# Patient Record
Sex: MALE | Race: WHITE | NOT HISPANIC OR LATINO
[De-identification: names, ages, dates, MRNs, and addresses within clinical notes are randomized per-mention and may not be internally consistent; named-entity substitution may affect disease eponyms.]

---

## 2018-01-11 PROBLEM — Z00.00 ENCOUNTER FOR PREVENTIVE HEALTH EXAMINATION: Status: ACTIVE | Noted: 2018-01-11

## 2018-01-16 ENCOUNTER — APPOINTMENT (OUTPATIENT)
Dept: SURGERY | Facility: CLINIC | Age: 83
End: 2018-01-16

## 2018-02-13 ENCOUNTER — APPOINTMENT (OUTPATIENT)
Dept: SURGERY | Facility: CLINIC | Age: 83
End: 2018-02-13

## 2018-04-04 VITALS
DIASTOLIC BLOOD PRESSURE: 67 MMHG | HEIGHT: 60 IN | HEART RATE: 63 BPM | OXYGEN SATURATION: 98 % | WEIGHT: 146.17 LBS | RESPIRATION RATE: 18 BRPM | SYSTOLIC BLOOD PRESSURE: 152 MMHG | TEMPERATURE: 98 F

## 2018-04-04 NOTE — PATIENT PROFILE ADULT. - PMH
Afib    CHF (congestive heart failure)    MI (myocardial infarction) Afib    CHF (congestive heart failure)    Enlarged prostate    GERD (gastroesophageal reflux disease)    High cholesterol    MI (myocardial infarction)

## 2018-04-05 ENCOUNTER — INPATIENT (INPATIENT)
Facility: HOSPITAL | Age: 83
LOS: 0 days | Discharge: HOME | End: 2018-04-05
Attending: SURGERY | Admitting: SURGERY
Payer: MEDICARE

## 2018-04-05 ENCOUNTER — INPATIENT (INPATIENT)
Facility: HOSPITAL | Age: 83
LOS: 8 days | Discharge: HOME HEALTH | DRG: 273 | End: 2018-04-14
Attending: INTERNAL MEDICINE | Admitting: INTERNAL MEDICINE
Payer: MEDICARE

## 2018-04-05 VITALS
HEART RATE: 68 BPM | RESPIRATION RATE: 17 BRPM | TEMPERATURE: 98 F | DIASTOLIC BLOOD PRESSURE: 82 MMHG | SYSTOLIC BLOOD PRESSURE: 121 MMHG | OXYGEN SATURATION: 98 %

## 2018-04-05 DIAGNOSIS — I25.10 ATHEROSCLEROTIC HEART DISEASE OF NATIVE CORONARY ARTERY WITHOUT ANGINA PECTORIS: ICD-10-CM

## 2018-04-05 DIAGNOSIS — I48.91 UNSPECIFIED ATRIAL FIBRILLATION: ICD-10-CM

## 2018-04-05 DIAGNOSIS — R63.8 OTHER SYMPTOMS AND SIGNS CONCERNING FOOD AND FLUID INTAKE: ICD-10-CM

## 2018-04-05 DIAGNOSIS — K21.9 GASTRO-ESOPHAGEAL REFLUX DISEASE WITHOUT ESOPHAGITIS: ICD-10-CM

## 2018-04-05 DIAGNOSIS — I48.2 CHRONIC ATRIAL FIBRILLATION: ICD-10-CM

## 2018-04-05 DIAGNOSIS — Z95.1 PRESENCE OF AORTOCORONARY BYPASS GRAFT: Chronic | ICD-10-CM

## 2018-04-05 DIAGNOSIS — I35.0 NONRHEUMATIC AORTIC (VALVE) STENOSIS: ICD-10-CM

## 2018-04-05 DIAGNOSIS — K46.9 UNSPECIFIED ABDOMINAL HERNIA WITHOUT OBSTRUCTION OR GANGRENE: ICD-10-CM

## 2018-04-05 DIAGNOSIS — Z29.9 ENCOUNTER FOR PROPHYLACTIC MEASURES, UNSPECIFIED: ICD-10-CM

## 2018-04-05 DIAGNOSIS — E78.00 PURE HYPERCHOLESTEROLEMIA, UNSPECIFIED: ICD-10-CM

## 2018-04-05 DIAGNOSIS — N40.0 BENIGN PROSTATIC HYPERPLASIA WITHOUT LOWER URINARY TRACT SYMPTOMS: ICD-10-CM

## 2018-04-05 DIAGNOSIS — I50.43 ACUTE ON CHRONIC COMBINED SYSTOLIC (CONGESTIVE) AND DIASTOLIC (CONGESTIVE) HEART FAILURE: ICD-10-CM

## 2018-04-05 DIAGNOSIS — I50.9 HEART FAILURE, UNSPECIFIED: ICD-10-CM

## 2018-04-05 DIAGNOSIS — E78.5 HYPERLIPIDEMIA, UNSPECIFIED: ICD-10-CM

## 2018-04-05 LAB
ALBUMIN SERPL ELPH-MCNC: 3.7 G/DL — SIGNIFICANT CHANGE UP (ref 3.3–5)
ALP SERPL-CCNC: 73 U/L — SIGNIFICANT CHANGE UP (ref 40–120)
ALT FLD-CCNC: 20 U/L — SIGNIFICANT CHANGE UP (ref 10–45)
ANION GAP SERPL CALC-SCNC: 15 MMOL/L — SIGNIFICANT CHANGE UP (ref 5–17)
APTT BLD: 35.7 SEC — SIGNIFICANT CHANGE UP (ref 27.5–37.4)
APTT BLD: 38.2 SEC — HIGH (ref 27.5–37.4)
AST SERPL-CCNC: 46 U/L — HIGH (ref 10–40)
BASOPHILS NFR BLD AUTO: 0.2 % — SIGNIFICANT CHANGE UP (ref 0–2)
BILIRUB SERPL-MCNC: 1 MG/DL — SIGNIFICANT CHANGE UP (ref 0.2–1.2)
BUN SERPL-MCNC: 12 MG/DL — SIGNIFICANT CHANGE UP (ref 7–23)
CALCIUM SERPL-MCNC: 9.6 MG/DL — SIGNIFICANT CHANGE UP (ref 8.4–10.5)
CHLORIDE SERPL-SCNC: 100 MMOL/L — SIGNIFICANT CHANGE UP (ref 96–108)
CHOLEST SERPL-MCNC: 162 MG/DL — SIGNIFICANT CHANGE UP (ref 10–199)
CK MB CFR SERPL CALC: 3.1 NG/ML — SIGNIFICANT CHANGE UP (ref 0–6.7)
CK MB CFR SERPL CALC: 3.4 NG/ML — SIGNIFICANT CHANGE UP (ref 0–6.7)
CK SERPL-CCNC: 114 U/L — SIGNIFICANT CHANGE UP (ref 30–200)
CK SERPL-CCNC: 89 U/L — SIGNIFICANT CHANGE UP (ref 30–200)
CO2 SERPL-SCNC: 25 MMOL/L — SIGNIFICANT CHANGE UP (ref 22–31)
CREAT SERPL-MCNC: 0.91 MG/DL — SIGNIFICANT CHANGE UP (ref 0.5–1.3)
EOSINOPHIL NFR BLD AUTO: 0.2 % — SIGNIFICANT CHANGE UP (ref 0–6)
GLUCOSE SERPL-MCNC: 112 MG/DL — HIGH (ref 70–99)
HBA1C BLD-MCNC: 5.8 % — HIGH (ref 4–5.6)
HCT VFR BLD CALC: 38.3 % — LOW (ref 39–50)
HDLC SERPL-MCNC: 61 MG/DL — SIGNIFICANT CHANGE UP (ref 40–125)
HGB BLD-MCNC: 12.2 G/DL — LOW (ref 13–17)
INR BLD: 1.25 — HIGH (ref 0.88–1.16)
INR BLD: 1.33 — HIGH (ref 0.88–1.16)
LIPID PNL WITH DIRECT LDL SERPL: 84 MG/DL — SIGNIFICANT CHANGE UP
LYMPHOCYTES # BLD AUTO: 24.8 % — SIGNIFICANT CHANGE UP (ref 13–44)
MCHC RBC-ENTMCNC: 31.9 G/DL — LOW (ref 32–36)
MCHC RBC-ENTMCNC: 32.3 PG — SIGNIFICANT CHANGE UP (ref 27–34)
MCV RBC AUTO: 101.3 FL — HIGH (ref 80–100)
MONOCYTES NFR BLD AUTO: 8.1 % — SIGNIFICANT CHANGE UP (ref 2–14)
NEUTROPHILS NFR BLD AUTO: 66.7 % — SIGNIFICANT CHANGE UP (ref 43–77)
NT-PROBNP SERPL-SCNC: HIGH PG/ML (ref 0–300)
PLATELET # BLD AUTO: 214 K/UL — SIGNIFICANT CHANGE UP (ref 150–400)
POTASSIUM SERPL-MCNC: 3.3 MMOL/L — LOW (ref 3.5–5.3)
POTASSIUM SERPL-MCNC: SIGNIFICANT CHANGE UP MMOL/L (ref 3.5–5.3)
POTASSIUM SERPL-SCNC: 3.3 MMOL/L — LOW (ref 3.5–5.3)
POTASSIUM SERPL-SCNC: SIGNIFICANT CHANGE UP MMOL/L (ref 3.5–5.3)
PROT SERPL-MCNC: 7.6 G/DL — SIGNIFICANT CHANGE UP (ref 6–8.3)
PROTHROM AB SERPL-ACNC: 13.9 SEC — HIGH (ref 9.8–12.7)
PROTHROM AB SERPL-ACNC: 14.9 SEC — HIGH (ref 9.8–12.7)
RBC # BLD: 3.78 M/UL — LOW (ref 4.2–5.8)
RBC # FLD: 15 % — SIGNIFICANT CHANGE UP (ref 10.3–16.9)
SODIUM SERPL-SCNC: 140 MMOL/L — SIGNIFICANT CHANGE UP (ref 135–145)
TOTAL CHOLESTEROL/HDL RATIO MEASUREMENT: 2.7 RATIO — LOW (ref 3.4–9.6)
TRIGL SERPL-MCNC: 83 MG/DL — SIGNIFICANT CHANGE UP (ref 10–149)
TROPONIN T SERPL-MCNC: 0.03 NG/ML — HIGH (ref 0–0.01)
TROPONIN T SERPL-MCNC: 0.03 NG/ML — HIGH (ref 0–0.01)
TSH SERPL-MCNC: 2.75 UIU/ML — SIGNIFICANT CHANGE UP (ref 0.35–4.94)
WBC # BLD: 5.3 K/UL — SIGNIFICANT CHANGE UP (ref 3.8–10.5)
WBC # FLD AUTO: 5.3 K/UL — SIGNIFICANT CHANGE UP (ref 3.8–10.5)

## 2018-04-05 PROCEDURE — 93010 ELECTROCARDIOGRAM REPORT: CPT

## 2018-04-05 PROCEDURE — C8929: CPT

## 2018-04-05 PROCEDURE — 99223 1ST HOSP IP/OBS HIGH 75: CPT

## 2018-04-05 PROCEDURE — 71045 X-RAY EXAM CHEST 1 VIEW: CPT | Mod: 26

## 2018-04-05 PROCEDURE — 93306 TTE W/DOPPLER COMPLETE: CPT | Mod: 26

## 2018-04-05 PROCEDURE — 99285 EMERGENCY DEPT VISIT HI MDM: CPT | Mod: 25

## 2018-04-05 PROCEDURE — 85610 PROTHROMBIN TIME: CPT

## 2018-04-05 PROCEDURE — 93010 ELECTROCARDIOGRAM REPORT: CPT | Mod: 77

## 2018-04-05 PROCEDURE — 85730 THROMBOPLASTIN TIME PARTIAL: CPT

## 2018-04-05 RX ORDER — HEPARIN SODIUM 5000 [USP'U]/ML
1000 INJECTION INTRAVENOUS; SUBCUTANEOUS
Qty: 25000 | Refills: 0 | Status: DISCONTINUED | OUTPATIENT
Start: 2018-04-05 | End: 2018-04-05

## 2018-04-05 RX ORDER — POTASSIUM CHLORIDE 20 MEQ
40 PACKET (EA) ORAL EVERY 4 HOURS
Qty: 0 | Refills: 0 | Status: COMPLETED | OUTPATIENT
Start: 2018-04-05 | End: 2018-04-05

## 2018-04-05 RX ORDER — TAMSULOSIN HYDROCHLORIDE 0.4 MG/1
0.4 CAPSULE ORAL AT BEDTIME
Qty: 0 | Refills: 0 | Status: DISCONTINUED | OUTPATIENT
Start: 2018-04-05 | End: 2018-04-14

## 2018-04-05 RX ORDER — ASPIRIN/CALCIUM CARB/MAGNESIUM 324 MG
81 TABLET ORAL DAILY
Qty: 0 | Refills: 0 | Status: DISCONTINUED | OUTPATIENT
Start: 2018-04-05 | End: 2018-04-12

## 2018-04-05 RX ORDER — PANTOPRAZOLE SODIUM 20 MG/1
40 TABLET, DELAYED RELEASE ORAL
Qty: 0 | Refills: 0 | Status: DISCONTINUED | OUTPATIENT
Start: 2018-04-05 | End: 2018-04-14

## 2018-04-05 RX ORDER — ACETAMINOPHEN 500 MG
650 TABLET ORAL EVERY 6 HOURS
Qty: 0 | Refills: 0 | Status: DISCONTINUED | OUTPATIENT
Start: 2018-04-05 | End: 2018-04-07

## 2018-04-05 RX ORDER — FUROSEMIDE 40 MG
40 TABLET ORAL ONCE
Qty: 0 | Refills: 0 | Status: COMPLETED | OUTPATIENT
Start: 2018-04-05 | End: 2018-04-05

## 2018-04-05 RX ORDER — HEPARIN SODIUM 5000 [USP'U]/ML
5000 INJECTION INTRAVENOUS; SUBCUTANEOUS EVERY 8 HOURS
Qty: 0 | Refills: 0 | Status: DISCONTINUED | OUTPATIENT
Start: 2018-04-05 | End: 2018-04-05

## 2018-04-05 RX ORDER — APIXABAN 2.5 MG/1
2.5 TABLET, FILM COATED ORAL EVERY 12 HOURS
Qty: 0 | Refills: 0 | Status: DISCONTINUED | OUTPATIENT
Start: 2018-04-05 | End: 2018-04-07

## 2018-04-05 RX ORDER — ATORVASTATIN CALCIUM 80 MG/1
20 TABLET, FILM COATED ORAL AT BEDTIME
Qty: 0 | Refills: 0 | Status: DISCONTINUED | OUTPATIENT
Start: 2018-04-05 | End: 2018-04-14

## 2018-04-05 RX ORDER — IPRATROPIUM/ALBUTEROL SULFATE 18-103MCG
3 AEROSOL WITH ADAPTER (GRAM) INHALATION ONCE
Qty: 0 | Refills: 0 | Status: COMPLETED | OUTPATIENT
Start: 2018-04-05 | End: 2018-04-05

## 2018-04-05 RX ADMIN — TAMSULOSIN HYDROCHLORIDE 0.4 MILLIGRAM(S): 0.4 CAPSULE ORAL at 21:49

## 2018-04-05 RX ADMIN — Medication 40 MILLIGRAM(S): at 19:06

## 2018-04-05 RX ADMIN — APIXABAN 2.5 MILLIGRAM(S): 2.5 TABLET, FILM COATED ORAL at 21:50

## 2018-04-05 RX ADMIN — Medication 40 MILLIEQUIVALENT(S): at 21:50

## 2018-04-05 RX ADMIN — Medication 3 MILLILITER(S): at 22:52

## 2018-04-05 RX ADMIN — Medication 40 MILLIEQUIVALENT(S): at 18:10

## 2018-04-05 RX ADMIN — ATORVASTATIN CALCIUM 20 MILLIGRAM(S): 80 TABLET, FILM COATED ORAL at 21:49

## 2018-04-05 NOTE — H&P ADULT - PROBLEM SELECTOR PLAN 4
Patient with Stable Angina Based on History. Troponin Elevated to 0.03. However, Likely Demand in Setting of CHF and Severe Aortic Stenosis.   -Will Trend Troponin To Peak.   -Continue Aspirin 81mg PO Q daily, Lipitor 20mg PO QHS.

## 2018-04-05 NOTE — CONSULT NOTE ADULT - SUBJECTIVE AND OBJECTIVE BOX
Surgeon: Dr. Cyr  Requesting Physician: Dr. Landry    HISTORY OF PRESENT ILLNESS:  89y Male    PAST MEDICAL & SURGICAL HISTORY:  Enlarged prostate  High cholesterol  GERD (gastroesophageal reflux disease)  MI (myocardial infarction)  CHF (congestive heart failure)  Afib  S/P CABG (coronary artery bypass graft): 19 years ago @ [facility]      MEDICATIONS  (STANDING):    MEDICATIONS  (PRN):      Allergies    No Known Allergies    Intolerances        SOCIAL HISTORY:  Smoker:  YES / NO        PACK YEARS:                         WHEN QUIT?  ETOH use:  YES / NO               FREQUENCY / QUANTITY:  Ilicit Drug use:  YES / NO  Occupation:  Assisted device use (Cane / Walker):  Live with:    FAMILY HISTORY:      Review of Systems  CONSTITUTIONAL:  Fevers / chills, sweats, fatigue, weight loss, weight gain                                    NEGATIVE  NEURO:  parathesias, seizures, syncope, confusion                                                                               NEGATIVE  EYES:  Blurry vision, discharge, pain, loss of vision                                                                                  NEGATIVE  ENMT:  Difficulty hearing, vertigo, dysphagia, epistaxis, recent dental work                                     NEGATIVE  CV:  Chest pain, palpitations, MCCARTHY, orthopnea                                                                                         NEGATIVE  RESPIRATORY:  Wheezing, SOB, cough / sputum, hemoptysis                                                              NEGATIVE  GI:  Nausea, vommiting, diarrhea, constipation, melena                                                                        NEGATIVE  : Hematuria, dysuria, urgency, incontinence                                                                                       NEGATIVE  MUSKULOSKELETAL:  arthritis, joint swelling, muscle weakness                                                           NEGATIVE  SKIN/BREAST:  rash, itching, shantell loss, masses                                                                                            NEGATIVE  PSYCH:  depresion, anxiety, suicidal ideation                                                                                             NEGATIVE  HEME/LYMPH:  bruises easily, enlarged lymph nodes, tender lymph nodes                                        NEGATIVE  ENDOCRINE:  cold intolerance, heat intolerance, polydipsia                                                                   NEGATIVE    PHYSICAL EXAM  Vital Signs Last 24 Hrs  T(C): 36.7 (05 Apr 2018 12:03), Max: 36.7 (05 Apr 2018 12:03)  T(F): 98 (05 Apr 2018 12:03), Max: 98 (05 Apr 2018 12:03)  HR: 68 (05 Apr 2018 12:03) (63 - 68)  BP: 121/82 (05 Apr 2018 12:03) (121/82 - 152/67)  BP(mean): --  RR: 17 (05 Apr 2018 12:03) (17 - 18)  SpO2: 98% (05 Apr 2018 12:03) (98% - 98%)    CONSTITUTIONAL:                                                                          WNL  NEURO:                                                                                             WNL                      EYES:                                                                                                  WNL  ENMT:                                                                                                WNL  CV:                                                                                                      WNL  RESPIRATORY:                                                                                  WNL  GI:                                                                                                       WNL  : GUTIERREZ + / -                                                                                 WNL  MUSKULOSKELETAL:                                                                       WNL  SKIN / BREAST:                                                                                 WNL                                                          LABS:                        12.2   5.3   )-----------( 214      ( 05 Apr 2018 13:06 )             38.3     04-05    140  |  100  |  12  ----------------------------<  112<H>  see note   |  25  |  0.91    Ca    9.6      05 Apr 2018 13:06    TPro  7.6  /  Alb  3.7  /  TBili  1.0  /  DBili  x   /  AST  46<H>  /  ALT  20  /  AlkPhos  73  04-05    PT/INR - ( 05 Apr 2018 13:06 )   PT: 14.9 sec;   INR: 1.33          PTT - ( 05 Apr 2018 13:06 )  PTT:35.7 sec    CARDIAC MARKERS ( 05 Apr 2018 13:06 )  x     / 0.03 ng/mL / 114 U/L / x     / 3.4 ng/mL          RADIOLOGY & ADDITIONAL STUDIES:    CXR: 4/5/18 official report pending. B/L infiltrates/effusion on CXR.    EKG: pending    TTE / GAVIOTA:  TTE Echo w/Cont Complete (04.05.18 @ 09:32)  Interpretation Summary  Irregular heart rate with various aortic gradient: the mean aortic valve   areabetween 46 and 32mmHg  Normal left ventricular size and wall   thickness.There is apical dyskinesis.The other walls are   hypokinetic.There is mid inferolateral wall dyskinesis.The left ventricular ejection fraction   is estimated to be 30-35%The left atrium is dilated.The mitral inflow   pattern is consistent with restrictive left ventricular filling, suggestive of   severely elevated left atrial pressure (>20mmHg).  Structurally normal mitral   valve.There is mild mitral regurgitation.Structurally normal tricuspid   valve.The pulmonary artery systolic pressure is estimated to be 80   mmHg.Structurally normal pulmonic valve.No aortic root dilatation.There   is no pericardial effusion.Left pleural effusion noted.A complete   two-dimensional transthoracic echocardiogram was performed (2D, M-mode, spectral and   color flow doppler).  Contrast injection with Definity performed Surgeon: Dr. Cyr  Requesting Physician: Dr. Landry    HISTORY OF PRESENT ILLNESS:  89y Male with a PMHx of CAD s/p CABG, afib on eliquis, AS, MR, CHD, encarcerated inguinal hernia (planning repair with Dr. Peres) that was sent to [facility] ED for a newly diagnosed pleural effusion.     PAST MEDICAL & SURGICAL HISTORY:  Enlarged prostate  High cholesterol  GERD (gastroesophageal reflux disease)  MI (myocardial infarction)  CHF (congestive heart failure)  Afib  S/P CABG (coronary artery bypass graft): 19 years ago @ [facility]      MEDICATIONS  (STANDING):    MEDICATIONS  (PRN):      Allergies    No Known Allergies    Intolerances        SOCIAL HISTORY:  Smoker:  YES / NO        PACK YEARS:                         WHEN QUIT?  ETOH use:  YES / NO               FREQUENCY / QUANTITY:  Ilicit Drug use:  YES / NO  Occupation:  Assisted device use (Cane / Walker):  Live with:    FAMILY HISTORY:      Review of Systems  CONSTITUTIONAL:  Fevers / chills, sweats, fatigue, weight loss, weight gain                                    NEGATIVE  NEURO:  parathesias, seizures, syncope, confusion                                                                               NEGATIVE  EYES:  Blurry vision, discharge, pain, loss of vision                                                                                  NEGATIVE  ENMT:  Difficulty hearing, vertigo, dysphagia, epistaxis, recent dental work                                     NEGATIVE  CV:  Chest pain, palpitations, MCCARTHY, orthopnea                                                                                         NEGATIVE  RESPIRATORY:  Wheezing, SOB, cough / sputum, hemoptysis                                                              NEGATIVE  GI:  Nausea, vommiting, diarrhea, constipation, melena                                                                        NEGATIVE  : Hematuria, dysuria, urgency, incontinence                                                                                       NEGATIVE  MUSKULOSKELETAL:  arthritis, joint swelling, muscle weakness                                                           NEGATIVE  SKIN/BREAST:  rash, itching, shantell loss, masses                                                                                            NEGATIVE  PSYCH:  depresion, anxiety, suicidal ideation                                                                                             NEGATIVE  HEME/LYMPH:  bruises easily, enlarged lymph nodes, tender lymph nodes                                        NEGATIVE  ENDOCRINE:  cold intolerance, heat intolerance, polydipsia                                                                   NEGATIVE    PHYSICAL EXAM  Vital Signs Last 24 Hrs  T(C): 36.7 (05 Apr 2018 12:03), Max: 36.7 (05 Apr 2018 12:03)  T(F): 98 (05 Apr 2018 12:03), Max: 98 (05 Apr 2018 12:03)  HR: 68 (05 Apr 2018 12:03) (63 - 68)  BP: 121/82 (05 Apr 2018 12:03) (121/82 - 152/67)  BP(mean): --  RR: 17 (05 Apr 2018 12:03) (17 - 18)  SpO2: 98% (05 Apr 2018 12:03) (98% - 98%)    CONSTITUTIONAL:                                                                          WNL  NEURO:                                                                                             WNL                      EYES:                                                                                                  WNL  ENMT:                                                                                                WNL  CV:                                                                                                      WNL  RESPIRATORY:                                                                                  WNL  GI:                                                                                                       WNL  : GUTIERREZ + / -                                                                                 WNL  MUSKULOSKELETAL:                                                                       WNL  SKIN / BREAST:                                                                                 WNL                                                          LABS:                        12.2   5.3   )-----------( 214      ( 05 Apr 2018 13:06 )             38.3     04-05    140  |  100  |  12  ----------------------------<  112<H>  see note   |  25  |  0.91    Ca    9.6      05 Apr 2018 13:06    TPro  7.6  /  Alb  3.7  /  TBili  1.0  /  DBili  x   /  AST  46<H>  /  ALT  20  /  AlkPhos  73  04-05    PT/INR - ( 05 Apr 2018 13:06 )   PT: 14.9 sec;   INR: 1.33          PTT - ( 05 Apr 2018 13:06 )  PTT:35.7 sec    CARDIAC MARKERS ( 05 Apr 2018 13:06 )  x     / 0.03 ng/mL / 114 U/L / x     / 3.4 ng/mL          RADIOLOGY & ADDITIONAL STUDIES:    CXR: 4/5/18 official report pending. B/L infiltrates/effusion on CXR.    EKG: pending    TTE / GAVIOTA:  TTE Echo w/Cont Complete (04.05.18 @ 09:32)  Interpretation Summary  Irregular heart rate with various aortic gradient: the mean aortic valve   areabetween 46 and 32mmHg  Normal left ventricular size and wall   thickness.There is apical dyskinesis.The other walls are   hypokinetic.There is mid inferolateral wall dyskinesis.The left ventricular ejection fraction   is estimated to be 30-35%The left atrium is dilated.The mitral inflow   pattern is consistent with restrictive left ventricular filling, suggestive of   severely elevated left atrial pressure (>20mmHg).  Structurally normal mitral   valve.There is mild mitral regurgitation.Structurally normal tricuspid   valve.The pulmonary artery systolic pressure is estimated to be 80   mmHg.Structurally normal pulmonic valve.No aortic root dilatation.There   is no pericardial effusion.Left pleural effusion noted.A complete   two-dimensional transthoracic echocardiogram was performed (2D, M-mode, spectral and   color flow doppler).  Contrast injection with Definity performed Surgeon: Dr. Cyr  Requesting Physician: Dr. Landry    HISTORY OF PRESENT ILLNESS: **information taken from chart and patient's family**  88yo [place] speaking male poor historian, with a PMHx of CAD s/p CABG in 2001, afib on eliquis, AS, MR, CHF, incarcerated inguinal hernia that was sent to [facility] ED for a newly diagnosed pleural effusion. Patient initially presented to [facility] for a planned inguinal hernia repair where he was noted to be short of breath, an echo was performed and he was found to have a pleural effusion. Patient complains of worsening shortness of breath over the last 2 months, he states that he used to sleep with 1 pillow but now he sleeps with 2-3 but still wakes up at night short of breath. He denies limitation with activity and just yesterday was still able to walk up his 3 flights of stairs without needing to rest. Of note, the patient has been admitted to [facility] and [facility] 3 times since the summer of 2017 for "fluid in his legs and lungs", the most recent being 2 months ago at [facility]. He denies fever, chills, headache, dizziness, palpitations, chest pain, abdominal pain, n/v/d, pain in the upper or lower extremities.      PAST MEDICAL & SURGICAL HISTORY:  Enlarged prostate  High cholesterol  GERD (gastroesophageal reflux disease)  MI (myocardial infarction)  CHF (congestive heart failure)  Afib  S/P CABG (coronary artery bypass graft): 19 years ago @ [facility]      MEDICATIONS  (STANDING):    MEDICATIONS  (PRN):      Allergies    No Known Allergies    Intolerances        SOCIAL HISTORY:  Smoker:  Never  ETOH use:  YES       FREQUENCY / QUANTITY: on holidays  Ilicit Drug use:  NO  Occupation: retired  Lives: 3 floor walk up      FAMILY HISTORY:      Review of Systems  CONSTITUTIONAL:  Fevers / chills, sweats, fatigue, weight loss, weight gain                                            NEGATIVE  NEURO:  parathesias, seizures, syncope, confusion                                                                               NEGATIVE  EYES:  Blurry vision, discharge, pain, loss of vision                                                                                NEGATIVE  ENMT:  Difficulty hearing, vertigo, dysphagia, epistaxis, recent dental work                                            NEGATIVE  CV:  -Chest pain, -palpitations, -MCCARTHY, +orthopnea                                                                               POSITIVE  RESPIRATORY:  -Wheezing, +SOB, -cough / -sputum, -hemoptysis                                                        POSITIVE  GI:  Nausea, vomiting diarrhea, constipation, melena                                                                          NEGATIVE  : Hematuria, dysuria, urgency, incontinence                                                                                    NEGATIVE  MUSKULOSKELETAL:  arthritis, joint swelling, muscle weakness                                                             NEGATIVE  SKIN/BREAST:  rash, itching, hair loss, masses                                                                                     NEGATIVE  PSYCH:  depression anxiety, suicidal ideation                                                                                      NEGATIVE  HEME/LYMPH:  bruises easily, enlarged lymph nodes, tender lymph nodes                                            NEGATIVE  ENDOCRINE:  cold intolerance, heat intolerance, polydipsia                                                                  NEGATIVE    PHYSICAL EXAM  Vital Signs Last 24 Hrs  T(C): 36.7 (05 Apr 2018 12:03), Max: 36.7 (05 Apr 2018 12:03)  T(F): 98 (05 Apr 2018 12:03), Max: 98 (05 Apr 2018 12:03)  HR: 68 (05 Apr 2018 12:03) (63 - 68)  BP: 121/82 (05 Apr 2018 12:03) (121/82 - 152/67)  BP(mean): --  RR: 17 (05 Apr 2018 12:03) (17 - 18)  SpO2: 98% (05 Apr 2018 12:03) (98% - 98%)    CONSTITUTIONAL: NAD, lying comfortably in bed   NEURO: A&Ox3, no focal deficits                      EYES: WNL  ENMT:    WNL  CV: S1S2, irregularly irregular, +3/4 SUSAN   RESPIRATORY:  decreased breath sounds at b/l bases. No wheezing/rales/rhonchi. SATing 95-97% on room air  GI: +BS. NT/ND  : GUTIERREZ -  MUSKULOSKELETAL:     WNL  SKIN / BREAST:  WNL  EXTREMITIES:  2+ pitting edema to knees. No calf tenderness. Pulses nonpalpable due to edema. Warm and well perfused. No varicosities.                                                          LABS:                        12.2   5.3   )-----------( 214      ( 05 Apr 2018 13:06 )             38.3     04-05    140  |  100  |  12  ----------------------------<  112<H>  see note   |  25  |  0.91    Ca    9.6      05 Apr 2018 13:06    TPro  7.6  /  Alb  3.7  /  TBili  1.0  /  DBili  x   /  AST  46<H>  /  ALT  20  /  AlkPhos  73  04-05    PT/INR - ( 05 Apr 2018 13:06 )   PT: 14.9 sec;   INR: 1.33          PTT - ( 05 Apr 2018 13:06 )  PTT:35.7 sec    CARDIAC MARKERS ( 05 Apr 2018 13:06 )  x     / 0.03 ng/mL / 114 U/L / x     / 3.4 ng/mL      RADIOLOGY & ADDITIONAL STUDIES:    CXR: 4/5/18 official report pending. B/L infiltrates/effusion on CXR.      EKG: pending    TTE / GAVIOTA:  TTE Echo w/Cont Complete (04.05.18 @ 09:32)  Interpretation Summary  Irregular heart rate with various aortic gradient: the mean aortic valve   areabetween 46 and 32mmHg  Normal left ventricular size and wall   thickness.There is apical dyskinesis.The other walls are   hypokinetic.There is mid inferolateral wall dyskinesis.The left ventricular ejection fraction   is estimated to be 30-35%The left atrium is dilated.The mitral inflow   pattern is consistent with restrictive left ventricular filling, suggestive of   severely elevated left atrial pressure (>20mmHg).  Structurally normal mitral   valve.There is mild mitral regurgitation.Structurally normal tricuspid   valve.The pulmonary artery systolic pressure is estimated to be 80   mmHg.Structurally normal pulmonic valve.No aortic root dilatation.There   is no pericardial effusion.Left pleural effusion noted.A complete   two-dimensional transthoracic echocardiogram was performed (2D, M-mode, spectral and   color flow doppler).  Contrast injection with Definity performed

## 2018-04-05 NOTE — ED PROVIDER NOTE - CARDIAC, MLM
vertical sternal scar, irregular rhythm, + systolic ejection murmur  Heart sounds S1, S2.  No murmurs, rubs or gallops.

## 2018-04-05 NOTE — H&P ADULT - PROBLEM SELECTOR PLAN 2
Patient with Reports of PND, on Examination with JVD, Bibasilar Rales, Lower Extremity Pitting Edema, Labs Demonstrating BNP 85066, CXR with Pulmonary Effusions. Likely Multifactorial, Patient with Decreased EF and Severe Aortic Stenosis.   -Patient on Lasix 40mg PO QD Outpatient as per Records Obtained from Cardiologist (Dr. Noyola), and Discussion with PCP.   -Will Dose for Lasix 40mg IV x 1, and Evaluate Response with Strict I/Os, Daily Weights.   -Some Records Indicate Patient is on BB and ARB, However, Unclear and Will Need To Confirm. Patient reports Was Told Not To Take Beta Blocker.

## 2018-04-05 NOTE — H&P ADULT - ASSESSMENT
Patient is an 89 year old male with past medical history of Coronary Artery Disease (S/P CABG), Systolic Congestive Heart Failure, Atrial Fibrillation, Aortic Stenosis, Mitral Regurgitation, BPH Admitted for Congestive Heart Failure and Aortic Stenosis Management.

## 2018-04-05 NOTE — H&P ADULT - HISTORY OF PRESENT ILLNESS
Patient is an 89 year old male with past medical history of Coronary Artery Disease (S/P CABG), Systolic Congestive Heart Failure, Atrial Fibrillation, Aortic Stenosis, Mitral Regurgitation, BPH presenting to [facility] for Elective Hernia Repair. No Preoperative Labs or Imaging, Labs, CXR, EKG, and Echocardiogram Obtained at [facility]. Patient with Elevated Troponin 0.03, Pleural Effusions on CXR, EKG Atrial Fibrillation with RBBB, and Echocardiogram with EF 30-35% and Severe Aortic Stenosis. Patient Sent to ED.     Patient Polish Speaking, Family at Bedside Translating. Patient denies Fever, Chills. Patient reports Intermittent Chest Pain For Last 1 Year. As per Patient, Substernal, Worsened with Exertion, and Improved with Rest. As per Patient Stable, Unchanged in Last 1 Year. Patient denies Associated SOB, Palpitations, Dizziness/Lightheadedness. However, Reports Can Only Walk 2-3 Blocks, Reporting Slow Decline Over Last 1 Year. Patient denies Orthopnea, but Reports Paroxysmal Nocturnal Dyspnea. As per Family at Bedside, Patient with Multiple Recent Admission to Outside Hospitals for "Leg Swelling". Patient reports Lower Extremity Edema. Patient denies Abdominal Pain, Nausea, Vomiting, Diarrhea, Constipation, Melena, Hematochezia. Patient reports Bilateral Inguinal Pain, Right>Left. Patient denies Scrotal Edema.     In the ED, Vitals were TMax 98, HR 68-72, -133/75-82, RR 17-18, RR 98% on RA. Labs Notable for Anemia (Hemoglobin 12.1), Elevated Troponin 0.03. Chest XR with Bilateral Pleural Effusions. Echocardiogram with EF 30-35% and Severe AS. CT Surgery Consulted from ED.

## 2018-04-05 NOTE — H&P ADULT - NSHPPHYSICALEXAM_GEN_ALL_CORE
Vital Signs Last 24 Hrs  T(C): 36.9 (05 Apr 2018 17:54), Max: 37.4 (05 Apr 2018 15:36)  T(F): 98.4 (05 Apr 2018 17:54), Max: 99.4 (05 Apr 2018 15:36)  HR: 74 (05 Apr 2018 20:23) (68 - 74)  BP: 150/82 (05 Apr 2018 20:23) (121/82 - 150/82)  BP(mean): 102 (05 Apr 2018 20:23) (102 - 106)  RR: 20 (05 Apr 2018 20:23) (17 - 20)  SpO2: 93% (05 Apr 2018 20:23) (93% - 98%)    General: Well Appearing, NAD  HEENT: NCAT, PERRLA B/L, EOMI B/L, MMM  Neck: Supple, + JVD  Heart: Normal S1+S2, Irregular, 2/6 Systolic Murmur 2nd ICS Space on Right without Radiation to Carotids, 3/6 Holosystolic Murmur Mitral Area with Radiation to Axilla, No Rubs or Gallops  Lungs: Decreased BS Lung Bases Bilaterally, + Bibasilar Rales, No Wheezes, No Rhonchi.   Abdomen: Soft, Mildly Distended, Normoactive Bowel Sounds, + Bilateral Inguinal Tenderness   Extremities: Cool, 1+ DP Pulses Bilaterally, 2-3+ Pitting Edema to Knees Bilaterally  Neurological: AAOx3  Skin: Cool, Dry

## 2018-04-05 NOTE — ED ADULT TRIAGE NOTE - CHIEF COMPLAINT QUOTE
Patient was getting cardiology clearance today  for hernia repair and was told he has pleural effusion after getting GAVIOTA.

## 2018-04-05 NOTE — H&P ADULT - PROBLEM SELECTOR PLAN 1
Patient with History of Severe Aortic Stenosis. Noted on Echocardiogram Performed at [facility] Before Admission.   -Structural Heart Consulted, Follow-Up Recommendations.

## 2018-04-05 NOTE — CONSULT NOTE ADULT - PROBLEM SELECTOR RECOMMENDATION 9
-Medical management per primary team  -Will discuss case with Dr. Cyr -medical optimization with diuresis  -monitor pleural effusion, daily am chest xrays. ?pigtail catheter  -will need TAVR workup, CT structural scans, carotids, PFTs  -Medical management per primary team  -Will discuss case with Dr. Cyr -possible BAV on this admission  -continue medical optimization with diuresis  -monitor pleural effusion, daily am chest xrays. ?pigtail catheter  -will need TAVR workup: CATH, CT structural scans, carotids, PFTs  -Medical management per primary team  -Will discuss case with Dr. Cyr

## 2018-04-05 NOTE — PROGRESS NOTE ADULT - SUBJECTIVE AND OBJECTIVE BOX
INTERVAL HISTORY:  Admitted with severe CHF in setting of severe AS and reduced LV function  	  MEDICATIONS:  tamsulosin 0.4 milliGRAM(s) Oral at bedtime        acetaminophen   Tablet. 650 milliGRAM(s) Oral every 6 hours PRN    pantoprazole    Tablet 40 milliGRAM(s) Oral before breakfast    atorvastatin 20 milliGRAM(s) Oral at bedtime    aspirin enteric coated 81 milliGRAM(s) Oral daily  heparin  Infusion 1000 Unit(s)/Hr IV Continuous <Continuous>  heparin  Injectable 5000 Unit(s) SubCutaneous every 8 hours  potassium chloride   Powder 40 milliEquivalent(s) Oral every 4 hours        PHYSICAL EXAM:  T(C): 36.9 (04-05-18 @ 17:54), Max: 37.4 (04-05-18 @ 15:36)  HR: 72 (04-05-18 @ 15:36) (68 - 72)  BP: 144/69 (04-05-18 @ 15:36) (121/82 - 144/69)  RR: 20 (04-05-18 @ 15:36) (17 - 20)  SpO2: 95% (04-05-18 @ 15:36) (95% - 98%)  Wt(kg): --  I&O's Summary    05 Apr 2018 07:01  -  05 Apr 2018 19:55  --------------------------------------------------------  IN: 240 mL / OUT: 450 mL / NET: -210 mL      Height (cm): 152.4 (04-05 @ 15:36), 152.4 (04-05 @ 06:43)  Weight (kg): 66.6 (04-05 @ 15:36), 66.3 (04-05 @ 06:43)  BMI (kg/m2): 28.7 (04-05 @ 15:36), 28.5 (04-05 @ 06:43)  BSA (m2): 1.64 (04-05 @ 15:36), 1.63 (04-05 @ 06:43)    Appearance: mild resp distress, using abd muscles  HEENT:   Normal oral mucosa, PERRL, EOMI	  Lymphatic: No lymphadenopathy  Cardiovascular: Irregular, variable S1, absent S2, No JVD, difficult to auscultate but between breaths there is a high pitched AS murmur and absent second heart sound, + edema  Respiratory: Lungs clear to auscultation	  Psychiatry: A & O x 3, Mood & affect appropriate  Gastrointestinal:  Soft, Non-tender, + BS	  Skin: No rashes, No ecchymoses, No cyanosis  Neurologic: Non-focal  Extremities: No clubbing, cyanosis, ++ edema      TELEMETRY: 	    ECG:  	  RADIOLOGY:   DIAGNOSTIC TESTING:  [ ] Echocardiogram:  [ ]  Catheterization:  [ ] Stress Test:    OTHER: 	    LABS:	 	    CARDIAC MARKERS:                                  12.2   5.3   )-----------( 214      ( 05 Apr 2018 13:06 )             38.3     04-05    x   |  x   |  x   ----------------------------<  x   3.3<L>   |  x   |  x     Ca    9.6      05 Apr 2018 13:06    TPro  7.6  /  Alb  3.7  /  TBili  1.0  /  DBili  x   /  AST  46<H>  /  ALT  20  /  AlkPhos  73  04-05    proBNP:   Lipid Profile:   HgA1c: Hemoglobin A1C, Whole Blood: 5.8 % (04-05 @ 13:06)    TSH: Thyroid Stimulating Hormone, Serum: 2.746 uIU/mL (04-05 @ 13:06)      ASSESSMENT/PLAN:

## 2018-04-05 NOTE — H&P ADULT - NSHPLABSRESULTS_GEN_ALL_CORE
CBC Full  -  ( 05 Apr 2018 13:06 )  WBC Count : 5.3 K/uL  Hemoglobin : 12.2 g/dL  Hematocrit : 38.3 %  Platelet Count - Automated : 214 K/uL  Mean Cell Volume : 101.3 fL  Mean Cell Hemoglobin : 32.3 pg  Mean Cell Hemoglobin Concentration : 31.9 g/dL  Auto Neutrophil # : x  Auto Lymphocyte # : x  Auto Monocyte # : x  Auto Eosinophil # : x  Auto Basophil # : x  Auto Neutrophil % : 66.7 %  Auto Lymphocyte % : 24.8 %  Auto Monocyte % : 8.1 %  Auto Eosinophil % : 0.2 %  Auto Basophil % : 0.2 %    04-05    x   |  x   |  x   ----------------------------<  x   3.3<L>   |  x   |  x     Ca    9.6      05 Apr 2018 13:06    TPro  7.6  /  Alb  3.7  /  TBili  1.0  /  DBili  x   /  AST  46<H>  /  ALT  20  /  AlkPhos  73  04-05    CARDIAC MARKERS ( 05 Apr 2018 20:48 )  x     / 0.03 ng/mL / 89 U/L / x     / 3.1 ng/mL  CARDIAC MARKERS ( 05 Apr 2018 13:06 )  x     / 0.03 ng/mL / 114 U/L / x     / 3.4 ng/mL

## 2018-04-05 NOTE — ED ADULT NURSE NOTE - PMH
Afib    CHF (congestive heart failure)    Enlarged prostate    GERD (gastroesophageal reflux disease)    High cholesterol    MI (myocardial infarction)

## 2018-04-05 NOTE — CONSULT NOTE ADULT - ASSESSMENT
88yo Indian speaking male poor historian, with a PMHx of CAD s/p CABG in 2001, afib on eliquis, AS, MR, CHF, incarcerated inguinal hernia being admitted to [facility] for acute on chronic exacerbation of CHF and new pleural effusion.

## 2018-04-05 NOTE — ED PROVIDER NOTE - ATTENDING CONTRIBUTION TO CARE
I discussed the plan of care of the patient directly with the PA while the patient was in the Emergency Department. I did not perform a face to face diagnostic evaluation on this patient. I have reviewed the ACP note and agree with the history, exam and plan of care. Pt is an 88yo m, h/o cad s/p cabg, chf, afib on eliquis, as, mr, referred to ED after echo showed severe as, poor ef of 30-35%, and left pleural effusion. Pt admits to worsening pnd. No cp, palp. Case d/w surgery- pt to be admitted to cardiac svc for further w/u and management.

## 2018-04-05 NOTE — ED PROVIDER NOTE - OBJECTIVE STATEMENT
MV 88 yo m s/p CABG, w/ pmh AS, MR, CHF, Afib on elliquis sent to ed from [facility] for admission for newly diagnosed Pleural effusion.  Pt reports some worsening paroxysmal nocturnal dyspnea for past few months. Pt otherwise denies any chest pain, nausea, vomiting, cough, hemoptysis

## 2018-04-05 NOTE — ED PROVIDER NOTE - MEDICAL DECISION MAKING DETAILS
MV 88 yo m s/p CABG, w/ pmh AS, MR, CHF, Afib on elliquis sent to ed from [facility] for admission for newly diagnosed Pleural effusion on echocardiogram.  Dr spears already had discussions with Dr. Landry, as confirmed by surgical resident, pt to be admitted to cardiology for further eval intervention.

## 2018-04-05 NOTE — H&P ADULT - PROBLEM SELECTOR PLAN 3
Stable.   -Continue Eliquis 2.5mg PO BID for Now. However, if Intervention Planned Likely Will Switched to Heparin Drip.   -Patient reports Being Told Not To Take Beta-Blocker. Will Hold For Now.

## 2018-04-05 NOTE — CONSULT NOTE ADULT - ATTENDING COMMENTS
88yo Comoran speaking male poor historian, with a PMHx of CAD s/p CABG in 2001, hx of MI, afib on eliquis, AS, MR, CHF, remote hx of incarcerated inguinal hernia with planned adm for elective hernia repair who was sent to ER for SOB. Patient/family reports complaints of worsening shortness of breath over the last 2 months, he states that he used to sleep with 1 pillow but now he sleeps with 2-3 with orthopnea. reported multiple admissions for CHF exacerbation. TTE showed EF 30-35%, segmental wall motion abnormality, severe AS mean AV gradient 46mmHg, PASP 80mmHg, lt pleural effusion. Exam remarkable for diminshed BS b/l 1/2 r>l, 3/6 SUSAN base rad to carotids, +1 b/l HAKEEM, unable to lay flat.  imp: severe AS; CM likely ischemic with hx of CAD/prior MI, s/p CABG; afib on NOAC; mild MR; remote hx of incarcerated hernia - stable without abd pain  plan:  -medical optimization, diuresis, t/c pigtail for pleural effusion removal 48hrs post NOAC d/c  -IV heparin bridge  -obtain CABG report  -plan for BAV as bridge for recovery/surgery; staged TAVR - next wk

## 2018-04-05 NOTE — ED ADULT NURSE NOTE - OBJECTIVE STATEMENT
89 y/o male c/o pleureal effusion incidentally found today on GAVIOTA that he was having done for clearance before hernia repair surgery. denies chest pain, n/v, fever/chills. only c/o paroxysmal dyspnea at night worsening over the last few weeks. NAD, VSS, EKG done.

## 2018-04-06 LAB
ANION GAP SERPL CALC-SCNC: 11 MMOL/L — SIGNIFICANT CHANGE UP (ref 5–17)
ANION GAP SERPL CALC-SCNC: 14 MMOL/L — SIGNIFICANT CHANGE UP (ref 5–17)
APPEARANCE UR: CLEAR — SIGNIFICANT CHANGE UP
APTT BLD: 41.4 SEC — HIGH (ref 27.5–37.4)
BASOPHILS NFR BLD AUTO: 0.2 % — SIGNIFICANT CHANGE UP (ref 0–2)
BILIRUB UR-MCNC: NEGATIVE — SIGNIFICANT CHANGE UP
BUN SERPL-MCNC: 14 MG/DL — SIGNIFICANT CHANGE UP (ref 7–23)
BUN SERPL-MCNC: 17 MG/DL — SIGNIFICANT CHANGE UP (ref 7–23)
CALCIUM SERPL-MCNC: 9.2 MG/DL — SIGNIFICANT CHANGE UP (ref 8.4–10.5)
CALCIUM SERPL-MCNC: 9.4 MG/DL — SIGNIFICANT CHANGE UP (ref 8.4–10.5)
CHLORIDE SERPL-SCNC: 101 MMOL/L — SIGNIFICANT CHANGE UP (ref 96–108)
CHLORIDE SERPL-SCNC: 101 MMOL/L — SIGNIFICANT CHANGE UP (ref 96–108)
CK MB CFR SERPL CALC: 2.9 NG/ML — SIGNIFICANT CHANGE UP (ref 0–6.7)
CK MB CFR SERPL CALC: 3.1 NG/ML — SIGNIFICANT CHANGE UP (ref 0–6.7)
CK SERPL-CCNC: 77 U/L — SIGNIFICANT CHANGE UP (ref 30–200)
CK SERPL-CCNC: 80 U/L — SIGNIFICANT CHANGE UP (ref 30–200)
CO2 SERPL-SCNC: 24 MMOL/L — SIGNIFICANT CHANGE UP (ref 22–31)
CO2 SERPL-SCNC: 29 MMOL/L — SIGNIFICANT CHANGE UP (ref 22–31)
COLOR SPEC: YELLOW — SIGNIFICANT CHANGE UP
CREAT SERPL-MCNC: 0.97 MG/DL — SIGNIFICANT CHANGE UP (ref 0.5–1.3)
CREAT SERPL-MCNC: 1.11 MG/DL — SIGNIFICANT CHANGE UP (ref 0.5–1.3)
DIFF PNL FLD: (no result)
EOSINOPHIL NFR BLD AUTO: 0.2 % — SIGNIFICANT CHANGE UP (ref 0–6)
FERRITIN SERPL-MCNC: 172 NG/ML — SIGNIFICANT CHANGE UP (ref 30–400)
FOLATE SERPL-MCNC: 19.1 NG/ML — SIGNIFICANT CHANGE UP (ref 4.8–24.2)
GLUCOSE SERPL-MCNC: 118 MG/DL — HIGH (ref 70–99)
GLUCOSE SERPL-MCNC: 185 MG/DL — HIGH (ref 70–99)
GLUCOSE UR QL: NEGATIVE — SIGNIFICANT CHANGE UP
HCT VFR BLD CALC: 35.9 % — LOW (ref 39–50)
HCT VFR BLD CALC: 39.2 % — SIGNIFICANT CHANGE UP (ref 39–50)
HGB BLD-MCNC: 11.2 G/DL — LOW (ref 13–17)
HGB BLD-MCNC: 12.1 G/DL — LOW (ref 13–17)
INR BLD: 1.47 — HIGH (ref 0.88–1.16)
IRON SATN MFR SERPL: 18 % — SIGNIFICANT CHANGE UP (ref 16–55)
IRON SATN MFR SERPL: 44 UG/DL — LOW (ref 45–165)
KETONES UR-MCNC: NEGATIVE — SIGNIFICANT CHANGE UP
LEUKOCYTE ESTERASE UR-ACNC: NEGATIVE — SIGNIFICANT CHANGE UP
LYMPHOCYTES # BLD AUTO: 19.2 % — SIGNIFICANT CHANGE UP (ref 13–44)
MAGNESIUM SERPL-MCNC: 1.8 MG/DL — SIGNIFICANT CHANGE UP (ref 1.6–2.6)
MCHC RBC-ENTMCNC: 30.9 G/DL — LOW (ref 32–36)
MCHC RBC-ENTMCNC: 31.2 G/DL — LOW (ref 32–36)
MCHC RBC-ENTMCNC: 32 PG — SIGNIFICANT CHANGE UP (ref 27–34)
MCHC RBC-ENTMCNC: 32.3 PG — SIGNIFICANT CHANGE UP (ref 27–34)
MCV RBC AUTO: 103.5 FL — HIGH (ref 80–100)
MCV RBC AUTO: 103.7 FL — HIGH (ref 80–100)
MONOCYTES NFR BLD AUTO: 10.2 % — SIGNIFICANT CHANGE UP (ref 2–14)
NEUTROPHILS NFR BLD AUTO: 70.2 % — SIGNIFICANT CHANGE UP (ref 43–77)
NITRITE UR-MCNC: NEGATIVE — SIGNIFICANT CHANGE UP
PH UR: 7 — SIGNIFICANT CHANGE UP (ref 5–8)
PHOSPHATE SERPL-MCNC: 3.8 MG/DL — SIGNIFICANT CHANGE UP (ref 2.5–4.5)
PLATELET # BLD AUTO: 188 K/UL — SIGNIFICANT CHANGE UP (ref 150–400)
PLATELET # BLD AUTO: 202 K/UL — SIGNIFICANT CHANGE UP (ref 150–400)
POTASSIUM SERPL-MCNC: 3.2 MMOL/L — LOW (ref 3.5–5.3)
POTASSIUM SERPL-MCNC: 4 MMOL/L — SIGNIFICANT CHANGE UP (ref 3.5–5.3)
POTASSIUM SERPL-SCNC: 3.2 MMOL/L — LOW (ref 3.5–5.3)
POTASSIUM SERPL-SCNC: 4 MMOL/L — SIGNIFICANT CHANGE UP (ref 3.5–5.3)
PROT UR-MCNC: NEGATIVE MG/DL — SIGNIFICANT CHANGE UP
PROTHROM AB SERPL-ACNC: 16.5 SEC — HIGH (ref 9.8–12.7)
RBC # BLD: 3.47 M/UL — LOW (ref 4.2–5.8)
RBC # BLD: 3.78 M/UL — LOW (ref 4.2–5.8)
RBC # FLD: 15.3 % — SIGNIFICANT CHANGE UP (ref 10.3–16.9)
RBC # FLD: 15.4 % — SIGNIFICANT CHANGE UP (ref 10.3–16.9)
SODIUM SERPL-SCNC: 139 MMOL/L — SIGNIFICANT CHANGE UP (ref 135–145)
SODIUM SERPL-SCNC: 141 MMOL/L — SIGNIFICANT CHANGE UP (ref 135–145)
SP GR SPEC: 1.01 — SIGNIFICANT CHANGE UP (ref 1–1.03)
TIBC SERPL-MCNC: 243 UG/DL — SIGNIFICANT CHANGE UP (ref 220–430)
TRANSFERRIN SERPL-MCNC: 197 MG/DL — LOW (ref 200–360)
TROPONIN T SERPL-MCNC: 0.03 NG/ML — HIGH (ref 0–0.01)
TROPONIN T SERPL-MCNC: 0.04 NG/ML — HIGH (ref 0–0.01)
UIBC SERPL-MCNC: 199 UG/DL — SIGNIFICANT CHANGE UP (ref 110–370)
UROBILINOGEN FLD QL: 0.2 E.U./DL — SIGNIFICANT CHANGE UP
VIT B12 SERPL-MCNC: 315 PG/ML — SIGNIFICANT CHANGE UP (ref 232–1245)
WBC # BLD: 5.5 K/UL — SIGNIFICANT CHANGE UP (ref 3.8–10.5)
WBC # BLD: 5.6 K/UL — SIGNIFICANT CHANGE UP (ref 3.8–10.5)
WBC # FLD AUTO: 5.5 K/UL — SIGNIFICANT CHANGE UP (ref 3.8–10.5)
WBC # FLD AUTO: 5.6 K/UL — SIGNIFICANT CHANGE UP (ref 3.8–10.5)

## 2018-04-06 PROCEDURE — 99233 SBSQ HOSP IP/OBS HIGH 50: CPT

## 2018-04-06 PROCEDURE — 93010 ELECTROCARDIOGRAM REPORT: CPT

## 2018-04-06 PROCEDURE — 99232 SBSQ HOSP IP/OBS MODERATE 35: CPT

## 2018-04-06 PROCEDURE — 71045 X-RAY EXAM CHEST 1 VIEW: CPT | Mod: 26

## 2018-04-06 RX ORDER — POTASSIUM CHLORIDE 20 MEQ
10 PACKET (EA) ORAL
Qty: 0 | Refills: 0 | Status: COMPLETED | OUTPATIENT
Start: 2018-04-06 | End: 2018-04-06

## 2018-04-06 RX ORDER — FUROSEMIDE 40 MG
20 TABLET ORAL ONCE
Qty: 0 | Refills: 0 | Status: COMPLETED | OUTPATIENT
Start: 2018-04-06 | End: 2018-04-06

## 2018-04-06 RX ORDER — ACETAMINOPHEN 500 MG
650 TABLET ORAL ONCE
Qty: 0 | Refills: 0 | Status: DISCONTINUED | OUTPATIENT
Start: 2018-04-06 | End: 2018-04-06

## 2018-04-06 RX ORDER — MAGNESIUM SULFATE 500 MG/ML
1 VIAL (ML) INJECTION ONCE
Qty: 0 | Refills: 0 | Status: COMPLETED | OUTPATIENT
Start: 2018-04-06 | End: 2018-04-06

## 2018-04-06 RX ORDER — FUROSEMIDE 40 MG
40 TABLET ORAL ONCE
Qty: 0 | Refills: 0 | Status: DISCONTINUED | OUTPATIENT
Start: 2018-04-06 | End: 2018-04-06

## 2018-04-06 RX ORDER — ACETAMINOPHEN 500 MG
325 TABLET ORAL ONCE
Qty: 0 | Refills: 0 | Status: COMPLETED | OUTPATIENT
Start: 2018-04-06 | End: 2018-04-06

## 2018-04-06 RX ORDER — POTASSIUM CHLORIDE 20 MEQ
40 PACKET (EA) ORAL ONCE
Qty: 0 | Refills: 0 | Status: COMPLETED | OUTPATIENT
Start: 2018-04-06 | End: 2018-04-06

## 2018-04-06 RX ADMIN — Medication 650 MILLIGRAM(S): at 14:16

## 2018-04-06 RX ADMIN — Medication 100 GRAM(S): at 08:43

## 2018-04-06 RX ADMIN — Medication 100 MILLIEQUIVALENT(S): at 10:59

## 2018-04-06 RX ADMIN — Medication 650 MILLIGRAM(S): at 15:00

## 2018-04-06 RX ADMIN — Medication 20 MILLIGRAM(S): at 08:43

## 2018-04-06 RX ADMIN — Medication 100 MILLIEQUIVALENT(S): at 09:29

## 2018-04-06 RX ADMIN — APIXABAN 2.5 MILLIGRAM(S): 2.5 TABLET, FILM COATED ORAL at 13:12

## 2018-04-06 RX ADMIN — TAMSULOSIN HYDROCHLORIDE 0.4 MILLIGRAM(S): 0.4 CAPSULE ORAL at 21:32

## 2018-04-06 RX ADMIN — Medication 325 MILLIGRAM(S): at 17:55

## 2018-04-06 RX ADMIN — APIXABAN 2.5 MILLIGRAM(S): 2.5 TABLET, FILM COATED ORAL at 21:32

## 2018-04-06 RX ADMIN — Medication 650 MILLIGRAM(S): at 05:00

## 2018-04-06 RX ADMIN — Medication 20 MILLIGRAM(S): at 04:59

## 2018-04-06 RX ADMIN — ATORVASTATIN CALCIUM 20 MILLIGRAM(S): 80 TABLET, FILM COATED ORAL at 21:32

## 2018-04-06 RX ADMIN — Medication 20 MILLIGRAM(S): at 16:22

## 2018-04-06 RX ADMIN — Medication 81 MILLIGRAM(S): at 13:12

## 2018-04-06 RX ADMIN — Medication 40 MILLIEQUIVALENT(S): at 13:11

## 2018-04-06 RX ADMIN — Medication 650 MILLIGRAM(S): at 04:07

## 2018-04-06 NOTE — PROGRESS NOTE ADULT - PROBLEM SELECTOR PLAN 2
- decompensated. Hypervolemic.   - dose lasix based on i/o; goal 1-2 L negative as tolerated by BP   - Strict I/Os, Daily Weights.   - holding BB and ACE-I in severe AS

## 2018-04-06 NOTE — PROGRESS NOTE ADULT - ASSESSMENT
89yo Filipino speaking male poor historian, with a PMHx of CAD s/p CABG in 2001, afib on eliquis, AS, MR, CHF, incarcerated inguinal hernia being admitted to [facility] for acute on chronic exacerbation of CHF and new pleural effusion.

## 2018-04-06 NOTE — PROGRESS NOTE ADULT - SUBJECTIVE AND OBJECTIVE BOX
INTERVAL HPI/OVERNIGHT EVENTS: Patient with increased tachypnea o/n. given 20 mg IVP lasix at 4 AM. Placed on BiPap w/ improvement in respiratory status.     SUBJECTIVE: Patient seen and examined at bedside. No complaints this AM. Reports breathing better on Bipap.     OBJECTIVE:    VITAL SIGNS:  ICU Vital Signs Last 24 Hrs  T(C): 36.7 (2018 10:05), Max: 37.4 (2018 15:36)  T(F): 98 (2018 10:05), Max: 99.4 (2018 15:36)  HR: 63 (2018 09:34) (62 - 85)  BP: 137/80 (2018 09:34) (117/73 - 168/77)  BP(mean): 98 (2018 07:39) (93 - 108)  ABP: --  ABP(mean): --  RR: 19 (2018 09:34) (14 - 25)  SpO2: 100% (2018 09:43) (93% - 100%)         @ 07: @ 07:00  --------------------------------------------------------  IN: 360 mL / OUT: 1475 mL / NET: -1115 mL    04 @ 07:01  -  0406 @ 12:49  --------------------------------------------------------  IN: 150 mL / OUT: 1075 mL / NET: -925 mL      CAPILLARY BLOOD GLUCOSE          PHYSICAL EXAM:    General: NAD, resting comfortably in bed  HEENT: NC/AT; PERRL, clear conjunctiva, MMM  Neck: supple, JVD 6 cm  Respiratory: b/l rales to mid lung. decreased breath sounds at bases. on bipap.   Cardiovascular: irregularly irregular, jose luis. 3/6 crescendo/decrescendo murmur RUSB.   Abdomen: soft, NT/ND; +BS x4  Extremities: WWP, 2+ peripheral pulses b/l; 2+ pitting edema to mid thigh b/l.   Skin: normal color and turgor; no rash  Neurological: a&ox3. no focal deficits. deconditioned.     MEDICATIONS:  MEDICATIONS  (STANDING):  apixaban 2.5 milliGRAM(s) Oral every 12 hours  aspirin enteric coated 81 milliGRAM(s) Oral daily  atorvastatin 20 milliGRAM(s) Oral at bedtime  pantoprazole    Tablet 40 milliGRAM(s) Oral before breakfast  potassium chloride   Powder 40 milliEquivalent(s) Oral once  tamsulosin 0.4 milliGRAM(s) Oral at bedtime    MEDICATIONS  (PRN):  acetaminophen   Tablet. 650 milliGRAM(s) Oral every 6 hours PRN Moderate Pain (4 - 6)      ALLERGIES:  Allergies    No Known Allergies    Intolerances        LABS:                        11.2   5.6   )-----------( 188      ( 2018 07:20 )             35.9     04-06    139  |  101  |  14  ----------------------------<  118<H>  3.2<L>   |  24  |  0.97    Ca    9.2      2018 07:20  Phos  3.8     04-06  Mg     1.8     04-06    TPro  7.6  /  Alb  3.7  /  TBili  1.0  /  DBili  x   /  AST  46<H>  /  ALT  20  /  AlkPhos  73  04-05    PT/INR - ( 2018 07:20 )   PT: 16.5 sec;   INR: 1.47          PTT - ( 2018 07:20 )  PTT:41.4 sec  Urinalysis Basic - ( 2018 03:30 )    Color: Yellow / Appearance: Clear / S.015 / pH: x  Gluc: x / Ketone: NEGATIVE  / Bili: Negative / Urobili: 0.2 E.U./dL   Blood: x / Protein: NEGATIVE mg/dL / Nitrite: NEGATIVE   Leuk Esterase: NEGATIVE / RBC: < 5 /HPF / WBC < 5 /HPF   Sq Epi: x / Non Sq Epi: 0-5 /HPF / Bacteria: Present /HPF        RADIOLOGY & ADDITIONAL TESTS:     Xray Chest 1 View AP/PA (18 @ 07:03)   FINDINGS: Heart size is stable. Median sternotomy wires are again seen.   Bilateral pleural effusions are again seen, mildly increased since the   prior examination. There is stable pulmonary vascular congestion. There   is no pneumothorax.

## 2018-04-06 NOTE — PROGRESS NOTE ADULT - PROBLEM SELECTOR PLAN 2
Hold Eliquis on Saturday and transition to Heparin in preparation for cath.  Will need to coordinate with Dr Cyr

## 2018-04-06 NOTE — PROGRESS NOTE ADULT - PROBLEM SELECTOR PLAN 1
-CT angio abdomen and pelvis with contrast (TAVR Protocol)  -CT heart congenital with IV contrast  -CATH/BAV next week (Monday/Tuesday), will inform primary team when scheduled.  -medical management per primary team

## 2018-04-06 NOTE — PROGRESS NOTE ADULT - PROBLEM SELECTOR PLAN 1
Patient with History of Severe Aortic Stenosis. Noted on Echocardiogram Performed at [facility] Before Admission.   - symptomatic severe AS w/ heart failure   -Structural Heart Consulted, possibly for BAV pending improvement in diuresis  - Pre op labs ordered.

## 2018-04-06 NOTE — PROGRESS NOTE ADULT - SUBJECTIVE AND OBJECTIVE BOX
Patient discussed on morning rounds with Dr. Cyr  Operation / Date: Pre-TAVR    SUBJECTIVE ASSESSMENT:  89y Male, no acute events overnight, seen at the bedside. He is without complaints. Denies fever, chills, headache, dizziness, chest pain, palpitations, abdominal pain, n/v/d.    Vital Signs Last 24 Hrs  T(C): 36.6 (2018 13:00), Max: 37.4 (2018 15:36)  T(F): 97.8 (2018 13:00), Max: 99.4 (2018 15:36)  HR: 68 (2018 12:07) (62 - 85)  BP: 150/80 (2018 12:07) (117/73 - 168/77)  BP(mean): 98 (2018 07:39) (93 - 108)  RR: 18 (2018 12:07) (14 - 25)  SpO2: 97% (2018 12:07) (93% - 100%)    I&O's Detail  2018 07:01  -  2018 07:00  --------------------------------------------------------  IN:    Oral Fluid: 360 mL  Total IN: 360 mL  OUT:    Voided: 1475 mL  Total OUT: 1475 mL  Total NET: -1115 mL  2018 07:01  -  2018 15:21  --------------------------------------------------------  IN:    IV PiggyBack: 150 mL    Oral Fluid: 320 mL  Total IN: 470 mL  OUT:    Voided: 1075 mL  Total OUT: 1075 mL  Total NET: -605 mL      CHEST TUBE:  No.   AUDREY DRAIN:  No.  EPICARDIAL WIRES: No.  TIE DOWNS: No.  GUTIERREZ: No.    PHYSICAL EXAM:  General: NAD, sitting comfortably in his bed  Neurological: A&Ox3, no focal deficits  Cardiovascular: S1S2, irregularly irregular, +3/4 SUSAN RUSB  Respiratory: decreased breath sounds b/l bases with coarse bibasilar breath sounds. Normal inspiratory effort.  Gastrointestinal: +BS. NT/ND  Extremities: 2+ pitting edema b/l past knees. No calf tenderness  Vascular: 1+ peripheral pulses. warm and well perfused  Incision Sites: n/a    LABS:                        12.1   5.5   )-----------( 202      ( 2018 12:39 )             39.2       COUMADIN:  No.   PT/INR - ( 2018 07:20 )   PT: 16.5 sec;   INR: 1.47     PTT - ( 2018 07:20 )  PTT:41.4 sec    04-  139  |  101  |  14  ----------------------------<  118<H>  3.2<L>   |  24  |  0.97    Ca    9.2      2018 07:20  Phos  3.8     04-06  Mg     1.8     04-06  TPro  7.6  /  Alb  3.7  /  TBili  1.0  /  DBili  x   /  AST  46<H>  /  ALT  20  /  AlkPhos  73  04-05      Urinalysis Basic - ( 2018 03:30 )  Color: Yellow / Appearance: Clear / S.015 / pH: x  Gluc: x / Ketone: NEGATIVE  / Bili: Negative / Urobili: 0.2 E.U./dL   Blood: x / Protein: NEGATIVE mg/dL / Nitrite: NEGATIVE   Leuk Esterase: NEGATIVE / RBC: < 5 /HPF / WBC < 5 /HPF   Sq Epi: x / Non Sq Epi: 0-5 /HPF / Bacteria: Present /HPF      MEDICATIONS  (STANDING):  apixaban 2.5 milliGRAM(s) Oral every 12 hours  aspirin enteric coated 81 milliGRAM(s) Oral daily  atorvastatin 20 milliGRAM(s) Oral at bedtime  pantoprazole    Tablet 40 milliGRAM(s) Oral before breakfast  tamsulosin 0.4 milliGRAM(s) Oral at bedtime    MEDICATIONS  (PRN):  acetaminophen   Tablet. 650 milliGRAM(s) Oral every 6 hours PRN Moderate Pain (4 - 6)      RADIOLOGY & ADDITIONAL TESTS:  Xray Chest 1 View AP/PA (18 @ 07:03)  FINDINGS: Heart size is stable. Median sternotomy wires are again seen.   Bilateral pleural effusions are again seen, mildly increased since the   prior examination. There is stable pulmonary vascular congestion. There   is no pneumothorax.  IMPRESSION: Stable cardiomegaly and pulmonary vascular congestion. Mildly   increased bilateral pleural effusions.

## 2018-04-06 NOTE — PROGRESS NOTE ADULT - SUBJECTIVE AND OBJECTIVE BOX
INTERVAL HISTORY:  resting comfortably  HR slower  	  MEDICATIONS:  furosemide   Injectable 20 milliGRAM(s) IV Push once  tamsulosin 0.4 milliGRAM(s) Oral at bedtime  acetaminophen   Tablet. 650 milliGRAM(s) Oral every 6 hours PRN    pantoprazole    Tablet 40 milliGRAM(s) Oral before breakfast    atorvastatin 20 milliGRAM(s) Oral at bedtime    apixaban 2.5 milliGRAM(s) Oral every 12 hours  aspirin enteric coated 81 milliGRAM(s) Oral daily  potassium chloride   Powder 40 milliEquivalent(s) Oral once  potassium chloride  10 mEq/100 mL IVPB 10 milliEquivalent(s) IV Intermittent every 1 hour        PHYSICAL EXAM:  T(C): 36.7 (04-06-18 @ 05:11), Max: 37.4 (04-05-18 @ 15:36)  HR: 62 (04-06-18 @ 07:39) (62 - 85)  BP: 133/60 (04-06-18 @ 07:39) (117/73 - 168/77)  RR: 14 (04-06-18 @ 07:39) (14 - 25)  SpO2: 100% (04-06-18 @ 07:39) (93% - 100%)  Wt(kg): --  I&O's Summary    05 Apr 2018 07:01  -  06 Apr 2018 07:00  --------------------------------------------------------  IN: 360 mL / OUT: 1475 mL / NET: -1115 mL    06 Apr 2018 07:01  -  06 Apr 2018 08:09  --------------------------------------------------------  IN: 0 mL / OUT: 350 mL / NET: -350 mL      Height (cm): 152.4 (04-05 @ 15:36)  Weight (kg): 66.6 (04-05 @ 15:36)  BMI (kg/m2): 28.7 (04-05 @ 15:36)  BSA (m2): 1.64 (04-05 @ 15:36)    Appearance: Normal	  HEENT:   Normal oral mucosa, PERRL, EOMI	  Lymphatic: No lymphadenopathy  Cardiovascular: Irregular, variable S1, No JVD, AS murmur, absent S2, + edema  Respiratory: Lungs clear to auscultation	  Psychiatry: A & O x 3, Mood & affect appropriate  Gastrointestinal:  Soft, Non-tender, + BS	  Skin: No rashes, No ecchymoses, No cyanosis  Neurologic: Non-focal  Extremities: Normal range of motion, No clubbing, cyanosi, + edema      TELEMETRY: 	    ECG:  	  RADIOLOGY:   DIAGNOSTIC TESTING:  [ ] Echocardiogram:  [ ]  Catheterization:  [ ] Stress Test:    OTHER: 	    LABS:	 	    CARDIAC MARKERS:                                  11.2   5.6   )-----------( 188      ( 06 Apr 2018 07:20 )             35.9     04-06    139  |  101  |  14  ----------------------------<  118<H>  3.2<L>   |  24  |  0.97    Ca    9.2      06 Apr 2018 07:20  Phos  3.8     04-06  Mg     1.8     04-06    TPro  7.6  /  Alb  3.7  /  TBili  1.0  /  DBili  x   /  AST  46<H>  /  ALT  20  /  AlkPhos  73  04-05    proBNP: Serum Pro-Brain Natriuretic Peptide: 46461 pg/mL (04-05 @ 15:15)    Lipid Profile:   HgA1c: Hemoglobin A1C, Whole Blood: 5.8 % (04-05 @ 13:06)    TSH: Thyroid Stimulating Hormone, Serum: 2.746 uIU/mL (04-05 @ 13:06)      ASSESSMENT/PLAN:

## 2018-04-07 LAB
ANION GAP SERPL CALC-SCNC: 13 MMOL/L — SIGNIFICANT CHANGE UP (ref 5–17)
APTT BLD: 134.7 SEC — CRITICAL HIGH (ref 27.5–37.4)
APTT BLD: 155.4 SEC — CRITICAL HIGH (ref 27.5–37.4)
BUN SERPL-MCNC: 19 MG/DL — SIGNIFICANT CHANGE UP (ref 7–23)
CALCIUM SERPL-MCNC: 9.6 MG/DL — SIGNIFICANT CHANGE UP (ref 8.4–10.5)
CHLORIDE SERPL-SCNC: 102 MMOL/L — SIGNIFICANT CHANGE UP (ref 96–108)
CK MB CFR SERPL CALC: 3.1 NG/ML — SIGNIFICANT CHANGE UP (ref 0–6.7)
CK SERPL-CCNC: 67 U/L — SIGNIFICANT CHANGE UP (ref 30–200)
CO2 SERPL-SCNC: 25 MMOL/L — SIGNIFICANT CHANGE UP (ref 22–31)
CREAT SERPL-MCNC: 1.05 MG/DL — SIGNIFICANT CHANGE UP (ref 0.5–1.3)
GLUCOSE SERPL-MCNC: 120 MG/DL — HIGH (ref 70–99)
HCT VFR BLD CALC: 36.4 % — LOW (ref 39–50)
HGB BLD-MCNC: 11.8 G/DL — LOW (ref 13–17)
MAGNESIUM SERPL-MCNC: 2 MG/DL — SIGNIFICANT CHANGE UP (ref 1.6–2.6)
MCHC RBC-ENTMCNC: 32.4 G/DL — SIGNIFICANT CHANGE UP (ref 32–36)
MCHC RBC-ENTMCNC: 33.2 PG — SIGNIFICANT CHANGE UP (ref 27–34)
MCV RBC AUTO: 102.5 FL — HIGH (ref 80–100)
PLATELET # BLD AUTO: 163 K/UL — SIGNIFICANT CHANGE UP (ref 150–400)
POTASSIUM SERPL-MCNC: 4.3 MMOL/L — SIGNIFICANT CHANGE UP (ref 3.5–5.3)
POTASSIUM SERPL-SCNC: 4.3 MMOL/L — SIGNIFICANT CHANGE UP (ref 3.5–5.3)
RBC # BLD: 3.55 M/UL — LOW (ref 4.2–5.8)
RBC # FLD: 15 % — SIGNIFICANT CHANGE UP (ref 10.3–16.9)
SODIUM SERPL-SCNC: 140 MMOL/L — SIGNIFICANT CHANGE UP (ref 135–145)
TROPONIN T SERPL-MCNC: 0.04 NG/ML — HIGH (ref 0–0.01)
WBC # BLD: 5.8 K/UL — SIGNIFICANT CHANGE UP (ref 3.8–10.5)
WBC # FLD AUTO: 5.8 K/UL — SIGNIFICANT CHANGE UP (ref 3.8–10.5)

## 2018-04-07 PROCEDURE — 99232 SBSQ HOSP IP/OBS MODERATE 35: CPT

## 2018-04-07 PROCEDURE — 71045 X-RAY EXAM CHEST 1 VIEW: CPT | Mod: 26,76

## 2018-04-07 PROCEDURE — 74018 RADEX ABDOMEN 1 VIEW: CPT | Mod: 26

## 2018-04-07 PROCEDURE — 93010 ELECTROCARDIOGRAM REPORT: CPT

## 2018-04-07 RX ORDER — HEPARIN SODIUM 5000 [USP'U]/ML
INJECTION INTRAVENOUS; SUBCUTANEOUS
Qty: 25000 | Refills: 0 | Status: DISCONTINUED | OUTPATIENT
Start: 2018-04-07 | End: 2018-04-07

## 2018-04-07 RX ORDER — ACETAMINOPHEN 500 MG
1000 TABLET ORAL ONCE
Qty: 0 | Refills: 0 | Status: COMPLETED | OUTPATIENT
Start: 2018-04-07 | End: 2018-04-07

## 2018-04-07 RX ORDER — SIMETHICONE 80 MG/1
80 TABLET, CHEWABLE ORAL ONCE
Qty: 0 | Refills: 0 | Status: COMPLETED | OUTPATIENT
Start: 2018-04-07 | End: 2018-04-07

## 2018-04-07 RX ORDER — ACETAMINOPHEN 500 MG
325 TABLET ORAL EVERY 4 HOURS
Qty: 0 | Refills: 0 | Status: DISCONTINUED | OUTPATIENT
Start: 2018-04-07 | End: 2018-04-14

## 2018-04-07 RX ORDER — FUROSEMIDE 40 MG
20 TABLET ORAL ONCE
Qty: 0 | Refills: 0 | Status: COMPLETED | OUTPATIENT
Start: 2018-04-07 | End: 2018-04-07

## 2018-04-07 RX ORDER — HEPARIN SODIUM 5000 [USP'U]/ML
800 INJECTION INTRAVENOUS; SUBCUTANEOUS
Qty: 25000 | Refills: 0 | Status: DISCONTINUED | OUTPATIENT
Start: 2018-04-07 | End: 2018-04-08

## 2018-04-07 RX ORDER — HEPARIN SODIUM 5000 [USP'U]/ML
1000 INJECTION INTRAVENOUS; SUBCUTANEOUS
Qty: 25000 | Refills: 0 | Status: DISCONTINUED | OUTPATIENT
Start: 2018-04-07 | End: 2018-04-07

## 2018-04-07 RX ADMIN — SIMETHICONE 80 MILLIGRAM(S): 80 TABLET, CHEWABLE ORAL at 20:48

## 2018-04-07 RX ADMIN — PANTOPRAZOLE SODIUM 40 MILLIGRAM(S): 20 TABLET, DELAYED RELEASE ORAL at 06:17

## 2018-04-07 RX ADMIN — SIMETHICONE 80 MILLIGRAM(S): 80 TABLET, CHEWABLE ORAL at 10:17

## 2018-04-07 RX ADMIN — Medication 30 MILLILITER(S): at 09:11

## 2018-04-07 RX ADMIN — HEPARIN SODIUM 1200 UNIT(S)/HR: 5000 INJECTION INTRAVENOUS; SUBCUTANEOUS at 10:08

## 2018-04-07 RX ADMIN — ATORVASTATIN CALCIUM 20 MILLIGRAM(S): 80 TABLET, FILM COATED ORAL at 21:38

## 2018-04-07 RX ADMIN — Medication 81 MILLIGRAM(S): at 11:11

## 2018-04-07 RX ADMIN — HEPARIN SODIUM 10 UNIT(S)/HR: 5000 INJECTION INTRAVENOUS; SUBCUTANEOUS at 16:21

## 2018-04-07 RX ADMIN — TAMSULOSIN HYDROCHLORIDE 0.4 MILLIGRAM(S): 0.4 CAPSULE ORAL at 21:38

## 2018-04-07 RX ADMIN — Medication 10 MILLIGRAM(S): at 17:47

## 2018-04-07 RX ADMIN — Medication 325 MILLIGRAM(S): at 21:17

## 2018-04-07 RX ADMIN — Medication 325 MILLIGRAM(S): at 20:46

## 2018-04-07 RX ADMIN — Medication 325 MILLIGRAM(S): at 12:05

## 2018-04-07 RX ADMIN — Medication 20 MILLIGRAM(S): at 01:45

## 2018-04-07 RX ADMIN — Medication 10 MILLIGRAM(S): at 23:12

## 2018-04-07 RX ADMIN — Medication 400 MILLIGRAM(S): at 00:51

## 2018-04-07 RX ADMIN — Medication 1000 MILLIGRAM(S): at 01:30

## 2018-04-07 RX ADMIN — Medication 325 MILLIGRAM(S): at 10:35

## 2018-04-07 RX ADMIN — Medication 30 MILLILITER(S): at 21:38

## 2018-04-07 NOTE — PROGRESS NOTE ADULT - SUBJECTIVE AND OBJECTIVE BOX
INTERVAL HPI/OVERNIGHT EVENTS: ANDRAE o/n. Given lasix 20 mg IVP x1 for drop in urine output.     SUBJECTIVE: Patient seen and examined at bedside. This am patient complaining of substernal pressure like pain. Pain does not radiate. when asked to point to the location patient points at the epigastric region. Associated gas and urge to defecate. EKG w/o changes from baseline. Cardiac enzymes unchanged from baseline. Pain improved with simethicone 80 mg and tylenol x1.     OBJECTIVE:    VITAL SIGNS:  ICU Vital Signs Last 24 Hrs  T(C): 36.3 (2018 05:13), Max: 37 (2018 17:59)  T(F): 97.4 (2018 05:13), Max: 98.6 (2018 17:59)  HR: 76 (2018 11:22) (58 - 90)  BP: 135/75 (2018 10:00) (108/60 - 162/83)  BP(mean): 84 (2018 10:00) (72 - 115)  ABP: --  ABP(mean): --  RR: 28 (2018 10:00) (14 - 32)  SpO2: 96% (2018 11:22) (93% - 100%)        -06 @ 07:01  -   @ 07:00  --------------------------------------------------------  IN: 770 mL / OUT: 2025 mL / NET: -1255 mL      CAPILLARY BLOOD GLUCOSE          PHYSICAL EXAM:    General: NAD, resting comfortably in bed  HEENT: NC/AT; PERRL, clear conjunctiva, DMMM  Neck: supple, no JVD  Respiratory: bibasilar crackles on supplemental o2 nc  Cardiovascular: +S1/S2; irregularly irregular   Abdomen: soft, NT/ND; +BS x4  Extremities: WWP, 2+ peripheral pulses b/l; no LE edema  Skin: normal color and turgor; no rash  Neurological: a&ox3, no focal deficits.     MEDICATIONS:  MEDICATIONS  (STANDING):  aspirin enteric coated 81 milliGRAM(s) Oral daily  atorvastatin 20 milliGRAM(s) Oral at bedtime  heparin  Infusion.  Unit(s)/Hr (12 mL/Hr) IV Continuous <Continuous>  pantoprazole    Tablet 40 milliGRAM(s) Oral before breakfast  tamsulosin 0.4 milliGRAM(s) Oral at bedtime    MEDICATIONS  (PRN):  acetaminophen   Tablet. 325 milliGRAM(s) Oral every 4 hours PRN Moderate Pain (4 - 6)      ALLERGIES:  Allergies    No Known Allergies    Intolerances        LABS:                        11.8   5.8   )-----------( 163      ( 2018 08:09 )             36.4     04-07    140  |  102  |  19  ----------------------------<  120<H>  4.3   |  25  |  1.05    Ca    9.6      2018 08:09  Phos  3.8     04-06  Mg     2.0     04-07      PT/INR - ( 2018 07:20 )   PT: 16.5 sec;   INR: 1.47          PTT - ( 2018 07:20 )  PTT:41.4 sec  Urinalysis Basic - ( 2018 03:30 )    Color: Yellow / Appearance: Clear / S.015 / pH: x  Gluc: x / Ketone: NEGATIVE  / Bili: Negative / Urobili: 0.2 E.U./dL   Blood: x / Protein: NEGATIVE mg/dL / Nitrite: NEGATIVE   Leuk Esterase: NEGATIVE / RBC: < 5 /HPF / WBC < 5 /HPF   Sq Epi: x / Non Sq Epi: 0-5 /HPF / Bacteria: Present /HPF        RADIOLOGY & ADDITIONAL TESTS:     Chest xray- no interval change  Tele- andrae, slow fib to upper 50s while sleeping  EKG - afib, RBBB

## 2018-04-07 NOTE — PROGRESS NOTE ADULT - SUBJECTIVE AND OBJECTIVE BOX
INTERVAL HISTORY:  Vague chest and abd discomfort  	  MEDICATIONS:  tamsulosin 0.4 milliGRAM(s) Oral at bedtime  acetaminophen   Tablet. 325 milliGRAM(s) Oral every 4 hours PRN    pantoprazole    Tablet 40 milliGRAM(s) Oral before breakfast    atorvastatin 20 milliGRAM(s) Oral at bedtime    aspirin enteric coated 81 milliGRAM(s) Oral daily  heparin  Infusion.  Unit(s)/Hr IV Continuous <Continuous>        PHYSICAL EXAM:  T(C): 36.3 (04-07-18 @ 05:13), Max: 37 (04-06-18 @ 17:59)  HR: 73 (04-07-18 @ 05:25) (58 - 80)  BP: 139/68 (04-07-18 @ 05:09) (108/60 - 162/83)  RR: 24 (04-07-18 @ 05:25) (14 - 32)  SpO2: 98% (04-07-18 @ 05:25) (93% - 100%)  Wt(kg): --  I&O's Summary    06 Apr 2018 07:01  -  07 Apr 2018 07:00  --------------------------------------------------------  IN: 770 mL / OUT: 2025 mL / NET: -1255 mL          Appearance: Normal	  HEENT:   Normal oral mucosa, PERRL, EOMI	  Lymphatic: No lymphadenopathy  Cardiovascular: Irregular, variable S1 absent S2, No JVD,AS murmur, +edema  Respiratory: Lungs clear to auscultation	  Psychiatry: Alert, Mood & affect appropriate  Gastrointestinal:  Soft, Non-tender, + BS	  Skin: No rashes, No ecchymoses, No cyanosis  Neurologic: Non-focal  Extremities: Normal range of motion, No clubbing, cyanosis, + edema  Vascular: Peripheral pulses palpable 2+ bilaterally    TELEMETRY: 	  AF, RBBB  ECG:  	AF, RBBB, ILWMI, no acute changes  RADIOLOGY:   DIAGNOSTIC TESTING:  [ ] Echocardiogram:  [ ]  Catheterization:  [ ] Stress Test:    OTHER: 	    LABS:	 	    CARDIAC MARKERS:                                  11.8   5.8   )-----------( 163      ( 07 Apr 2018 08:09 )             36.4     04-07    140  |  102  |  19  ----------------------------<  120<H>  4.3   |  25  |  1.05    Ca    9.6      07 Apr 2018 08:09  Phos  3.8     04-06  Mg     2.0     04-07    TPro  7.6  /  Alb  3.7  /  TBili  1.0  /  DBili  x   /  AST  46<H>  /  ALT  20  /  AlkPhos  73  04-05    proBNP:   Lipid Profile:   HgA1c:   TSH:     ASSESSMENT/PLAN:

## 2018-04-07 NOTE — PROGRESS NOTE ADULT - PROBLEM SELECTOR PLAN 1
Patient with History of Severe Aortic Stenosis. Noted on Echocardiogram Performed at [facility] Before Admission.   - symptomatic severe AS w/ heart failure   -Structural Heart Consulted, possibly for BAV pending improvement in diuresis  - Pre op labs ordered, patient unstable for imaging. To complete imaging Monday following diuresis this weekend

## 2018-04-08 LAB
ANION GAP SERPL CALC-SCNC: 12 MMOL/L — SIGNIFICANT CHANGE UP (ref 5–17)
APTT BLD: 81.4 SEC — HIGH (ref 27.5–37.4)
APTT BLD: 90 SEC — HIGH (ref 27.5–37.4)
APTT BLD: 92.1 SEC — HIGH (ref 27.5–37.4)
BLD GP AB SCN SERPL QL: NEGATIVE — SIGNIFICANT CHANGE UP
BUN SERPL-MCNC: 22 MG/DL — SIGNIFICANT CHANGE UP (ref 7–23)
CALCIUM SERPL-MCNC: 9.7 MG/DL — SIGNIFICANT CHANGE UP (ref 8.4–10.5)
CHLORIDE SERPL-SCNC: 102 MMOL/L — SIGNIFICANT CHANGE UP (ref 96–108)
CO2 SERPL-SCNC: 26 MMOL/L — SIGNIFICANT CHANGE UP (ref 22–31)
CREAT SERPL-MCNC: 0.95 MG/DL — SIGNIFICANT CHANGE UP (ref 0.5–1.3)
GLUCOSE SERPL-MCNC: 121 MG/DL — HIGH (ref 70–99)
HCT VFR BLD CALC: 37.2 % — LOW (ref 39–50)
HGB BLD-MCNC: 11.4 G/DL — LOW (ref 13–17)
MAGNESIUM SERPL-MCNC: 2.2 MG/DL — SIGNIFICANT CHANGE UP (ref 1.6–2.6)
MCHC RBC-ENTMCNC: 30.6 G/DL — LOW (ref 32–36)
MCHC RBC-ENTMCNC: 32 PG — SIGNIFICANT CHANGE UP (ref 27–34)
MCV RBC AUTO: 104.5 FL — HIGH (ref 80–100)
PLATELET # BLD AUTO: 182 K/UL — SIGNIFICANT CHANGE UP (ref 150–400)
POTASSIUM SERPL-MCNC: 4.4 MMOL/L — SIGNIFICANT CHANGE UP (ref 3.5–5.3)
POTASSIUM SERPL-SCNC: 4.4 MMOL/L — SIGNIFICANT CHANGE UP (ref 3.5–5.3)
RBC # BLD: 3.56 M/UL — LOW (ref 4.2–5.8)
RBC # FLD: 15.1 % — SIGNIFICANT CHANGE UP (ref 10.3–16.9)
RH IG SCN BLD-IMP: POSITIVE — SIGNIFICANT CHANGE UP
SODIUM SERPL-SCNC: 140 MMOL/L — SIGNIFICANT CHANGE UP (ref 135–145)
TROPONIN T SERPL-MCNC: 0.04 NG/ML — HIGH (ref 0–0.01)
WBC # BLD: 6 K/UL — SIGNIFICANT CHANGE UP (ref 3.8–10.5)
WBC # FLD AUTO: 6 K/UL — SIGNIFICANT CHANGE UP (ref 3.8–10.5)

## 2018-04-08 PROCEDURE — 99232 SBSQ HOSP IP/OBS MODERATE 35: CPT

## 2018-04-08 PROCEDURE — 93010 ELECTROCARDIOGRAM REPORT: CPT | Mod: 76

## 2018-04-08 PROCEDURE — 71045 X-RAY EXAM CHEST 1 VIEW: CPT | Mod: 26

## 2018-04-08 PROCEDURE — 93880 EXTRACRANIAL BILAT STUDY: CPT | Mod: 26

## 2018-04-08 RX ORDER — FUROSEMIDE 40 MG
40 TABLET ORAL EVERY 12 HOURS
Qty: 0 | Refills: 0 | Status: DISCONTINUED | OUTPATIENT
Start: 2018-04-08 | End: 2018-04-09

## 2018-04-08 RX ORDER — HEPARIN SODIUM 5000 [USP'U]/ML
750 INJECTION INTRAVENOUS; SUBCUTANEOUS
Qty: 25000 | Refills: 0 | Status: DISCONTINUED | OUTPATIENT
Start: 2018-04-08 | End: 2018-04-09

## 2018-04-08 RX ORDER — FUROSEMIDE 40 MG
40 TABLET ORAL ONCE
Qty: 0 | Refills: 0 | Status: COMPLETED | OUTPATIENT
Start: 2018-04-08 | End: 2018-04-08

## 2018-04-08 RX ADMIN — Medication 325 MILLIGRAM(S): at 17:27

## 2018-04-08 RX ADMIN — Medication 40 MILLIGRAM(S): at 08:42

## 2018-04-08 RX ADMIN — Medication 30 MILLILITER(S): at 20:23

## 2018-04-08 RX ADMIN — Medication 40 MILLIGRAM(S): at 20:21

## 2018-04-08 RX ADMIN — HEPARIN SODIUM 7.5 UNIT(S)/HR: 5000 INJECTION INTRAVENOUS; SUBCUTANEOUS at 14:28

## 2018-04-08 RX ADMIN — ATORVASTATIN CALCIUM 20 MILLIGRAM(S): 80 TABLET, FILM COATED ORAL at 21:26

## 2018-04-08 RX ADMIN — HEPARIN SODIUM 8 UNIT(S)/HR: 5000 INJECTION INTRAVENOUS; SUBCUTANEOUS at 00:27

## 2018-04-08 RX ADMIN — Medication 81 MILLIGRAM(S): at 08:48

## 2018-04-08 RX ADMIN — PANTOPRAZOLE SODIUM 40 MILLIGRAM(S): 20 TABLET, DELAYED RELEASE ORAL at 07:24

## 2018-04-08 RX ADMIN — TAMSULOSIN HYDROCHLORIDE 0.4 MILLIGRAM(S): 0.4 CAPSULE ORAL at 21:26

## 2018-04-08 RX ADMIN — Medication 325 MILLIGRAM(S): at 16:57

## 2018-04-08 NOTE — PROGRESS NOTE ADULT - ASSESSMENT
Patient is an 89 year old male with past medical history of Coronary Artery Disease (S/P CABG), Systolic Congestive Heart Failure, Atrial Fibrillation, Aortic Stenosis, Mitral Regurgitation, BPH Admitted for Congestive Heart Failure and Aortic Stenosis Management. Continues to Be Volume Overloaded.

## 2018-04-08 NOTE — PROGRESS NOTE ADULT - PROBLEM SELECTOR PLAN 1
Severe by echo and exam, h/o worsening CHF.  Will need to diurese then fix valve.  Dr Cyr called. W/U ordered

## 2018-04-08 NOTE — PROGRESS NOTE ADULT - SUBJECTIVE AND OBJECTIVE BOX
Patient discussed on morning rounds with Dr. Morfin     Operation / Date:  pre-op Cath/BAV this week    SUBJECTIVE ASSESSMENT:  89y Male seen and examined. Nurse at bedside for interpretation, [place].  Patient feels well, no acute chest pain, SOB, palpitations, SOb, abdominal pain, dizziness.  Patient aware of CT scans tomorrow and possible procedure this week.         Vital Signs Last 24 Hrs  T(C): 37.1 (08 Apr 2018 13:28), Max: 37.2 (08 Apr 2018 10:00)  T(F): 98.7 (08 Apr 2018 13:28), Max: 99 (08 Apr 2018 10:00)  HR: 82 (08 Apr 2018 12:20) (81 - 87)  BP: 141/67 (08 Apr 2018 12:20) (115/67 - 154/75)  BP(mean): 109 (08 Apr 2018 12:20) (76 - 120)  RR: 16 (08 Apr 2018 12:20) (10 - 31)  SpO2: 98% (08 Apr 2018 12:20) (94% - 100%)  I&O's Detail    07 Apr 2018 07:01  -  08 Apr 2018 07:00  --------------------------------------------------------  IN:    heparin Infusion: 40 mL    heparin Infusion: 64 mL    Oral Fluid: 300 mL  Total IN: 404 mL    OUT:    Voided: 100 mL  Total OUT: 100 mL    Total NET: 304 mL      08 Apr 2018 07:01  -  08 Apr 2018 14:50  --------------------------------------------------------  IN:    heparin Infusion: 32 mL    Oral Fluid: 150 mL  Total IN: 182 mL    OUT:    Voided: 525 mL  Total OUT: 525 mL    Total NET: -343 mL    PHYSICAL EXAM:    General: Laying comfortably in bed, no acute distress    Neurological: awake alert oriented, no neuro deficits     Cardiovascular: S1S2, irregular rhythm, +II/VI SUSAN RUSB    Respiratory: coarse breath sounds throughout, bibasilar rales, no wheeze/    Gastrointestinal: +BS, soft nontender nondistended    Extremities: warm and well perfused, 1+ b/l LE pitting edema, no calf tenderness    Vascular: 1+ peripheral pulses b/l      LABS:                        11.4   6.0   )-----------( 182      ( 08 Apr 2018 04:40 )             37.2       COUMADIN: No    PTT - ( 08 Apr 2018 12:56 )  PTT:90.0 sec    04-08    140  |  102  |  22  ----------------------------<  121<H>  4.4   |  26  |  0.95    Ca    9.7      08 Apr 2018 04:40  Mg     2.2     04-08            MEDICATIONS  (STANDING):  aspirin enteric coated 81 milliGRAM(s) Oral daily  atorvastatin 20 milliGRAM(s) Oral at bedtime  furosemide   Injectable 40 milliGRAM(s) IV Push every 12 hours  heparin  Infusion 750 Unit(s)/Hr (7.5 mL/Hr) IV Continuous <Continuous>  pantoprazole    Tablet 40 milliGRAM(s) Oral before breakfast  tamsulosin 0.4 milliGRAM(s) Oral at bedtime    MEDICATIONS  (PRN):  acetaminophen   Tablet. 325 milliGRAM(s) Oral every 4 hours PRN Moderate Pain (4 - 6)  aluminum hydroxide/magnesium hydroxide/simethicone Suspension 30 milliLiter(s) Oral every 4 hours PRN Dyspepsia  bisacodyl Suppository 10 milliGRAM(s) Rectal daily PRN Constipation        RADIOLOGY & ADDITIONAL TESTS:  < from: Xray Chest 1 View- PORTABLE-Routine (04.08.18 @ 00:14) >  Portable examination shows cardiomegaly. Bilateral effusions. Status post   sternotomy.    Impression: Bilateral effusions      < end of copied text >

## 2018-04-08 NOTE — PROGRESS NOTE ADULT - SUBJECTIVE AND OBJECTIVE BOX
Cardiology Resident Progress Note      Physical Examination:   Vital Signs Last 24 Hrs  T(C): 36.4 (08 Apr 2018 05:16), Max: 37.1 (07 Apr 2018 23:02)  T(F): 97.6 (08 Apr 2018 05:16), Max: 98.7 (07 Apr 2018 23:02)  HR: 86 (08 Apr 2018 08:51) (66 - 87)  BP: 154/75 (08 Apr 2018 08:51) (95/61 - 154/75)  BP(mean): 120 (08 Apr 2018 08:51) (76 - 120)  RR: 16 (08 Apr 2018 08:51) (10 - 31)  SpO2: 95% (08 Apr 2018 08:51) (94% - 100%)  I&O's Detail    07 Apr 2018 07:01  -  08 Apr 2018 07:00  --------------------------------------------------------  IN:    heparin Infusion: 40 mL    heparin Infusion: 64 mL    Oral Fluid: 300 mL  Total IN: 404 mL    OUT:    Voided: 100 mL  Total OUT: 100 mL    Total NET: 304 mL    General:  HEENT:  Neck:  Heart:  Lungs:  Abdomen:   Extremities:  Neurological:  Skin:     Labs/Imaging:   CBC Full  -  ( 08 Apr 2018 04:40 )  WBC Count : 6.0 K/uL  Hemoglobin : 11.4 g/dL  Hematocrit : 37.2 %  Platelet Count - Automated : 182 K/uL  Mean Cell Volume : 104.5 fL  Mean Cell Hemoglobin : 32.0 pg  Mean Cell Hemoglobin Concentration : 30.6 g/dL  Auto Neutrophil # : x  Auto Lymphocyte # : x  Auto Monocyte # : x  Auto Eosinophil # : x  Auto Basophil # : x  Auto Neutrophil % : x  Auto Lymphocyte % : x  Auto Monocyte % : x  Auto Eosinophil % : x  Auto Basophil % : x    04-08    140  |  102  |  22  ----------------------------<  121<H>  4.4   |  26  |  0.95    Ca    9.7      08 Apr 2018 04:40  Mg     2.2     04-08      PTT - ( 08 Apr 2018 04:40 )  PTT:92.1 sec    CARDIAC MARKERS ( 08 Apr 2018 04:40 )  x     / 0.04 ng/mL / x     / x     / x      CARDIAC MARKERS ( 07 Apr 2018 22:17 )  x     / 0.04 ng/mL / x     / x     / x      CARDIAC MARKERS ( 07 Apr 2018 16:21 )  x     / 0.04 ng/mL / x     / x     / x      CARDIAC MARKERS ( 07 Apr 2018 10:41 )  x     / 0.04 ng/mL / 67 U/L / x     / 3.1 ng/mL  CARDIAC MARKERS ( 06 Apr 2018 12:39 )  x     / 0.03 ng/mL / 80 U/L / x     / 2.9 ng/mL    EKG: Pending  CXR: Bilateral Pleural Effusions Cardiology Resident Progress Note    Used [email or web address] as Patient is Maldivian Speaking.     Overnight, Patient is Abdominal Discomfort,   Physical Examination:   Vital Signs Last 24 Hrs  T(C): 36.4 (08 Apr 2018 05:16), Max: 37.1 (07 Apr 2018 23:02)  T(F): 97.6 (08 Apr 2018 05:16), Max: 98.7 (07 Apr 2018 23:02)  HR: 86 (08 Apr 2018 08:51) (66 - 87)  BP: 154/75 (08 Apr 2018 08:51) (95/61 - 154/75)  BP(mean): 120 (08 Apr 2018 08:51) (76 - 120)  RR: 16 (08 Apr 2018 08:51) (10 - 31)  SpO2: 95% (08 Apr 2018 08:51) (94% - 100%)  I&O's Detail    07 Apr 2018 07:01  -  08 Apr 2018 07:00  --------------------------------------------------------  IN:    heparin Infusion: 40 mL    heparin Infusion: 64 mL    Oral Fluid: 300 mL  Total IN: 404 mL    OUT:    Voided: 100 mL  Total OUT: 100 mL    Total NET: 304 mL    General:  HEENT:  Neck:  Heart:  Lungs:  Abdomen:   Extremities:  Neurological:  Skin:     Labs/Imaging:   CBC Full  -  ( 08 Apr 2018 04:40 )  WBC Count : 6.0 K/uL  Hemoglobin : 11.4 g/dL  Hematocrit : 37.2 %  Platelet Count - Automated : 182 K/uL  Mean Cell Volume : 104.5 fL  Mean Cell Hemoglobin : 32.0 pg  Mean Cell Hemoglobin Concentration : 30.6 g/dL  Auto Neutrophil # : x  Auto Lymphocyte # : x  Auto Monocyte # : x  Auto Eosinophil # : x  Auto Basophil # : x  Auto Neutrophil % : x  Auto Lymphocyte % : x  Auto Monocyte % : x  Auto Eosinophil % : x  Auto Basophil % : x    04-08    140  |  102  |  22  ----------------------------<  121<H>  4.4   |  26  |  0.95    Ca    9.7      08 Apr 2018 04:40  Mg     2.2     04-08      PTT - ( 08 Apr 2018 04:40 )  PTT:92.1 sec    CARDIAC MARKERS ( 08 Apr 2018 04:40 )  x     / 0.04 ng/mL / x     / x     / x      CARDIAC MARKERS ( 07 Apr 2018 22:17 )  x     / 0.04 ng/mL / x     / x     / x      CARDIAC MARKERS ( 07 Apr 2018 16:21 )  x     / 0.04 ng/mL / x     / x     / x      CARDIAC MARKERS ( 07 Apr 2018 10:41 )  x     / 0.04 ng/mL / 67 U/L / x     / 3.1 ng/mL  CARDIAC MARKERS ( 06 Apr 2018 12:39 )  x     / 0.03 ng/mL / 80 U/L / x     / 2.9 ng/mL    EKG: Pending  CXR: Bilateral Pleural Effusions Cardiology Resident Progress Note    Used [employer] as Patient is Nepalese Speaking.     Overnight, Patient is Abdominal Discomfort, Reporting He Had To Have Bowel Movement. Patient was Given Maalox, Dulcolax, and Simethicone with Improvement, and Had BM This AM>     This AM, Patient reporting SOB. Patient reports SOB Worsening with Laying Flat. Patient denies Chest Pain, Palpitations, Dizziness/Lightheadedness. Patient denies Fever, Chills, Nausea, Vomiting, Cough, Abdominal Pain, Diarrhea, Dysuria, Hematuria. Patient reports Continued Swelling of Lower Extremities.     Physical Examination:   Vital Signs Last 24 Hrs  T(C): 36.4 (08 Apr 2018 05:16), Max: 37.1 (07 Apr 2018 23:02)  T(F): 97.6 (08 Apr 2018 05:16), Max: 98.7 (07 Apr 2018 23:02)  HR: 86 (08 Apr 2018 08:51) (66 - 87)  BP: 154/75 (08 Apr 2018 08:51) (95/61 - 154/75)  BP(mean): 120 (08 Apr 2018 08:51) (76 - 120)  RR: 16 (08 Apr 2018 08:51) (10 - 31)  SpO2: 95% (08 Apr 2018 08:51) (94% - 100%)  I&O's Detail    07 Apr 2018 07:01  -  08 Apr 2018 07:00  --------------------------------------------------------  IN:    heparin Infusion: 40 mL    heparin Infusion: 64 mL    Oral Fluid: 300 mL  Total IN: 404 mL    OUT:    Voided: 100 mL  Total OUT: 100 mL    Total NET: 304 mL    General: Patient Mildly Tachypneic, Non-Toxic, Resting Comfortably  HEENT: NCAT, PERRLA B/L, EOMI B/L, MMM  Neck: Supple, + JVD  Heart: Normal S1+S2, 2/6 Systolic Murmur Over 2nd ICS on Right No Radiation to Carotids, No Rubs/No Gallps  Lungs: + Rales to Mid Lung Field Bilaterally, No Wheezes, No Rhonchi  Abdomen: Soft, NT/ND, Normoactive Bowel Sounds, No Inguinal Tenderness or Masses  Extremities: Cool, 2-3+ Pitting Edema Bilaterally Lower Extremities, 2+ Radial and DP Pulses Bilaterally  Neurological: AAOx3, No Focal Motor or Sensory Deficits   Skin: Cool, Dry     Labs/Imaging:   CBC Full  -  ( 08 Apr 2018 04:40 )  WBC Count : 6.0 K/uL  Hemoglobin : 11.4 g/dL  Hematocrit : 37.2 %  Platelet Count - Automated : 182 K/uL  Mean Cell Volume : 104.5 fL  Mean Cell Hemoglobin : 32.0 pg  Mean Cell Hemoglobin Concentration : 30.6 g/dL  Auto Neutrophil # : x  Auto Lymphocyte # : x  Auto Monocyte # : x  Auto Eosinophil # : x  Auto Basophil # : x  Auto Neutrophil % : x  Auto Lymphocyte % : x  Auto Monocyte % : x  Auto Eosinophil % : x  Auto Basophil % : x    04-08    140  |  102  |  22  ----------------------------<  121<H>  4.4   |  26  |  0.95    Ca    9.7      08 Apr 2018 04:40  Mg     2.2     04-08      PTT - ( 08 Apr 2018 04:40 )  PTT:92.1 sec    CARDIAC MARKERS ( 08 Apr 2018 04:40 )  x     / 0.04 ng/mL / x     / x     / x      CARDIAC MARKERS ( 07 Apr 2018 22:17 )  x     / 0.04 ng/mL / x     / x     / x      CARDIAC MARKERS ( 07 Apr 2018 16:21 )  x     / 0.04 ng/mL / x     / x     / x      CARDIAC MARKERS ( 07 Apr 2018 10:41 )  x     / 0.04 ng/mL / 67 U/L / x     / 3.1 ng/mL  CARDIAC MARKERS ( 06 Apr 2018 12:39 )  x     / 0.03 ng/mL / 80 U/L / x     / 2.9 ng/mL    EKG: Pending  CXR: Bilateral Pleural Effusions

## 2018-04-08 NOTE — PROGRESS NOTE ADULT - PROBLEM SELECTOR PLAN 2
Patient with Acute on Chronic Decompensated CHF. Echocardiogram with EF 30-35%.   -Patient Net Positive Over Last 24 Hours 304 mL. Weight Increased 65-65.5kg. No Lasix Given on 4/7/18. Ordered for Lasix 40mg IV x 1 in AM Today. Will Need to Be More Aggressive with Diuresis as Tolerated in Setting of Severe Aortic Stenosis. Goal Net Negative 1-2L Daily.   -Holding Beta Blocker, ACE-I/ARB in Setting of Severe AS.   -Strict I/Os, Daily Standing Weights.   -Once Optimized, Will Need To Perform Cardiac Catheterization to Determine if Worsening Cardiomyopathy is Ischemic in Setting of CAD History.

## 2018-04-08 NOTE — PROGRESS NOTE ADULT - SUBJECTIVE AND OBJECTIVE BOX
INTERVAL HISTORY:  + edema  Wt unchanged  	  MEDICATIONS:  tamsulosin 0.4 milliGRAM(s) Oral at bedtime  acetaminophen   Tablet. 325 milliGRAM(s) Oral every 4 hours PRN    aluminum hydroxide/magnesium hydroxide/simethicone Suspension 30 milliLiter(s) Oral every 4 hours PRN  bisacodyl Suppository 10 milliGRAM(s) Rectal daily PRN  pantoprazole    Tablet 40 milliGRAM(s) Oral before breakfast    atorvastatin 20 milliGRAM(s) Oral at bedtime    aspirin enteric coated 81 milliGRAM(s) Oral daily  heparin  Infusion 800 Unit(s)/Hr IV Continuous <Continuous>        PHYSICAL EXAM:  T(C): 37.2 (04-08-18 @ 10:00), Max: 37.2 (04-08-18 @ 10:00)  HR: 86 (04-08-18 @ 08:51) (66 - 87)  BP: 154/75 (04-08-18 @ 08:51) (95/61 - 154/75)  RR: 16 (04-08-18 @ 08:51) (10 - 31)  SpO2: 95% (04-08-18 @ 08:51) (94% - 100%)  Wt(kg): --  I&O's Summary    07 Apr 2018 07:01  -  08 Apr 2018 07:00  --------------------------------------------------------  IN: 404 mL / OUT: 100 mL / NET: 304 mL    08 Apr 2018 07:01  -  08 Apr 2018 10:16  --------------------------------------------------------  IN: 0 mL / OUT: 225 mL / NET: -225 mL          Appearance: Normal	  HEENT:   Normal oral mucosa, PERRL, EOMI	  Lymphatic: No lymphadenopathy  Cardiovascular: Irregular, variable S1 absent  S2, No JVD, AS murmur, + edema  Respiratory: Lungs clear to auscultation	  Psychiatry: A & O x 3, Mood & affect appropriate  Gastrointestinal:  Soft, Non-tender, + BS	  Skin: No rashes, No ecchymoses, No cyanosis  Neurologic: Non-focal  Extremities: Normal range of motion, No clubbing, cyanosis,  edema      TELEMETRY: 	  AF  ECG:  	  RADIOLOGY:   DIAGNOSTIC TESTING:  [ ] Echocardiogram:  [ ]  Catheterization:  [ ] Stress Test:    OTHER: 	    LABS:	 	    CARDIAC MARKERS:                                  11.4   6.0   )-----------( 182      ( 08 Apr 2018 04:40 )             37.2     04-08    140  |  102  |  22  ----------------------------<  121<H>  4.4   |  26  |  0.95    Ca    9.7      08 Apr 2018 04:40  Mg     2.2     04-08      proBNP:   Lipid Profile:   HgA1c:   TSH:     ASSESSMENT/PLAN:

## 2018-04-08 NOTE — PROGRESS NOTE ADULT - ASSESSMENT
88yo Lao speaking male poor historian, with a PMHx of CAD s/p CABG in 2001, afib on eliquis, AS, MR, CHF, incarcerated inguinal hernia being admitted to [facility] for acute on chronic exacerbation of CHF and new pleural effusion.    Severe AS/pleural effusion  -Discussed with Dr. Cyr  -NPO after midnight for TAVR CT scans tomorrow, plan for Cath/BAV this week  -Continue diuresis, replete electrolytes PRN, panage per primary team  -Continue hep gtt for Afib, monitor PTT, manage per primary team  -Continue asa/statin for CAD, manage per primary team

## 2018-04-08 NOTE — PROGRESS NOTE ADULT - PROBLEM SELECTOR PLAN 1
Patient with History of Severe Aortic Stenosis. Noted on Echocardiogram Performed at [facility] Before Admission.   -Structural Heart Consulted, For Possible TAVR.   -Order for CTA Cardiac, CT Chest, Abdomen/Pelvis, US Duplex Bilateral Carotid Arteries.   -Will Order for PFTs Once Breathing Improves Further.

## 2018-04-09 LAB
ANION GAP SERPL CALC-SCNC: 14 MMOL/L — SIGNIFICANT CHANGE UP (ref 5–17)
APTT BLD: 43.9 SEC — HIGH (ref 27.5–37.4)
APTT BLD: 51.5 SEC — HIGH (ref 27.5–37.4)
APTT BLD: 82.7 SEC — HIGH (ref 27.5–37.4)
APTT BLD: 87.9 SEC — HIGH (ref 27.5–37.4)
APTT BLD: 94.9 SEC — HIGH (ref 27.5–37.4)
BUN SERPL-MCNC: 24 MG/DL — HIGH (ref 7–23)
CALCIUM SERPL-MCNC: 9.5 MG/DL — SIGNIFICANT CHANGE UP (ref 8.4–10.5)
CHLORIDE SERPL-SCNC: 101 MMOL/L — SIGNIFICANT CHANGE UP (ref 96–108)
CO2 SERPL-SCNC: 26 MMOL/L — SIGNIFICANT CHANGE UP (ref 22–31)
CREAT SERPL-MCNC: 1.07 MG/DL — SIGNIFICANT CHANGE UP (ref 0.5–1.3)
GLUCOSE SERPL-MCNC: 125 MG/DL — HIGH (ref 70–99)
HCT VFR BLD CALC: 37.3 % — LOW (ref 39–50)
HGB BLD-MCNC: 11.6 G/DL — LOW (ref 13–17)
INR BLD: 1.46 — HIGH (ref 0.88–1.16)
MAGNESIUM SERPL-MCNC: 2.2 MG/DL — SIGNIFICANT CHANGE UP (ref 1.6–2.6)
MCHC RBC-ENTMCNC: 31.1 G/DL — LOW (ref 32–36)
MCHC RBC-ENTMCNC: 32.5 PG — SIGNIFICANT CHANGE UP (ref 27–34)
MCV RBC AUTO: 104.5 FL — HIGH (ref 80–100)
PLATELET # BLD AUTO: 184 K/UL — SIGNIFICANT CHANGE UP (ref 150–400)
POTASSIUM SERPL-MCNC: 3.8 MMOL/L — SIGNIFICANT CHANGE UP (ref 3.5–5.3)
POTASSIUM SERPL-SCNC: 3.8 MMOL/L — SIGNIFICANT CHANGE UP (ref 3.5–5.3)
PROTHROM AB SERPL-ACNC: 16.3 SEC — HIGH (ref 9.8–12.7)
RBC # BLD: 3.57 M/UL — LOW (ref 4.2–5.8)
RBC # FLD: 15.4 % — SIGNIFICANT CHANGE UP (ref 10.3–16.9)
SODIUM SERPL-SCNC: 141 MMOL/L — SIGNIFICANT CHANGE UP (ref 135–145)
WBC # BLD: 5.7 K/UL — SIGNIFICANT CHANGE UP (ref 3.8–10.5)
WBC # FLD AUTO: 5.7 K/UL — SIGNIFICANT CHANGE UP (ref 3.8–10.5)

## 2018-04-09 PROCEDURE — 99232 SBSQ HOSP IP/OBS MODERATE 35: CPT

## 2018-04-09 PROCEDURE — 70450 CT HEAD/BRAIN W/O DYE: CPT | Mod: 26

## 2018-04-09 PROCEDURE — 93010 ELECTROCARDIOGRAM REPORT: CPT

## 2018-04-09 PROCEDURE — 71045 X-RAY EXAM CHEST 1 VIEW: CPT | Mod: 26

## 2018-04-09 PROCEDURE — 74174 CTA ABD&PLVS W/CONTRAST: CPT | Mod: 26

## 2018-04-09 PROCEDURE — 75573 CT HRT C+ STRUX CGEN HRT DS: CPT | Mod: 26

## 2018-04-09 RX ORDER — POTASSIUM CHLORIDE 20 MEQ
20 PACKET (EA) ORAL ONCE
Qty: 0 | Refills: 0 | Status: COMPLETED | OUTPATIENT
Start: 2018-04-09 | End: 2018-04-09

## 2018-04-09 RX ORDER — FUROSEMIDE 40 MG
40 TABLET ORAL DAILY
Qty: 0 | Refills: 0 | Status: DISCONTINUED | OUTPATIENT
Start: 2018-04-10 | End: 2018-04-11

## 2018-04-09 RX ORDER — HEPARIN SODIUM 5000 [USP'U]/ML
800 INJECTION INTRAVENOUS; SUBCUTANEOUS
Qty: 25000 | Refills: 0 | Status: DISCONTINUED | OUTPATIENT
Start: 2018-04-09 | End: 2018-04-10

## 2018-04-09 RX ORDER — HEPARIN SODIUM 5000 [USP'U]/ML
700 INJECTION INTRAVENOUS; SUBCUTANEOUS
Qty: 25000 | Refills: 0 | Status: DISCONTINUED | OUTPATIENT
Start: 2018-04-09 | End: 2018-04-09

## 2018-04-09 RX ORDER — POLYETHYLENE GLYCOL 3350 17 G/17G
17 POWDER, FOR SOLUTION ORAL DAILY
Qty: 0 | Refills: 0 | Status: DISCONTINUED | OUTPATIENT
Start: 2018-04-09 | End: 2018-04-12

## 2018-04-09 RX ADMIN — Medication 40 MILLIGRAM(S): at 07:03

## 2018-04-09 RX ADMIN — HEPARIN SODIUM 7 UNIT(S)/HR: 5000 INJECTION INTRAVENOUS; SUBCUTANEOUS at 07:03

## 2018-04-09 RX ADMIN — Medication 81 MILLIGRAM(S): at 09:03

## 2018-04-09 RX ADMIN — ATORVASTATIN CALCIUM 20 MILLIGRAM(S): 80 TABLET, FILM COATED ORAL at 21:43

## 2018-04-09 RX ADMIN — HEPARIN SODIUM 8 UNIT(S)/HR: 5000 INJECTION INTRAVENOUS; SUBCUTANEOUS at 22:02

## 2018-04-09 RX ADMIN — Medication 20 MILLIEQUIVALENT(S): at 09:02

## 2018-04-09 RX ADMIN — PANTOPRAZOLE SODIUM 40 MILLIGRAM(S): 20 TABLET, DELAYED RELEASE ORAL at 07:28

## 2018-04-09 RX ADMIN — Medication 20 MILLIEQUIVALENT(S): at 07:59

## 2018-04-09 RX ADMIN — TAMSULOSIN HYDROCHLORIDE 0.4 MILLIGRAM(S): 0.4 CAPSULE ORAL at 21:43

## 2018-04-09 NOTE — PHYSICAL THERAPY INITIAL EVALUATION ADULT - MODALITIES TREATMENT COMMENTS
AROM in Standing: Hip Abduction x10 Bilaterally, Hip Extension x10 Bilaterally, Sit to stand x 10 from OOB chair

## 2018-04-09 NOTE — PROGRESS NOTE ADULT - SUBJECTIVE AND OBJECTIVE BOX
INTERVAL HPI/OVERNIGHT EVENTS: O/n patient complaining of palpitations and shortness of breath at approximately 10 PM. EKG performed showed baseline afib w/ RBBB, HR 85. No chest pain noted. Patient placed on bipap with improvement in his symptoms.     SUBJECTIVE: Patient seen and examined at bedside. This morning patient reports poor sleep. Had bowel movement yesterday with improvement in his abdominal discomfort/gas. Feels frustrated with his hospitalization. no new cardiovascular complaints this AM. Unable to lie flat w/o becoming dyspneic.     OBJECTIVE:    VITAL SIGNS:  ICU Vital Signs Last 24 Hrs  T(C): 36.6 (09 Apr 2018 08:39), Max: 37.2 (08 Apr 2018 10:00)  T(F): 97.8 (09 Apr 2018 08:39), Max: 99 (08 Apr 2018 10:00)  HR: 78 (09 Apr 2018 08:20) (70 - 84)  BP: 134/69 (09 Apr 2018 08:20) (122/71 - 148/75)  BP(mean): 88 (09 Apr 2018 08:20) (75 - 120)  ABP: --  ABP(mean): --  RR: 16 (09 Apr 2018 08:20) (15 - 23)  SpO2: 98% (09 Apr 2018 08:20) (96% - 99%)        04-08 @ 07:01  -  04-09 @ 07:00  --------------------------------------------------------  IN: 572 mL / OUT: 2410 mL / NET: -1838 mL      CAPILLARY BLOOD GLUCOSE          PHYSICAL EXAM:    General: NAD  HEENT: NC/AT; PERRL, clear conjunctiva  Neck: supple, no JVD  Respiratory: bibasilar crackles  Cardiovascular: irregularly irregular. 3/6 systolic ejection murmur RUSB.   Abdomen: soft, NT/ND; +BS x4  Extremities: WWP, 2+ peripheral pulses b/l; no LE edema  Skin: normal color and turgor; no rash  Neurological: a&ox3. no focal deficits.     MEDICATIONS:  MEDICATIONS  (STANDING):  aspirin enteric coated 81 milliGRAM(s) Oral daily  atorvastatin 20 milliGRAM(s) Oral at bedtime  furosemide   Injectable 40 milliGRAM(s) IV Push every 12 hours  heparin  Infusion 700 Unit(s)/Hr (7 mL/Hr) IV Continuous <Continuous>  pantoprazole    Tablet 40 milliGRAM(s) Oral before breakfast  potassium chloride    Tablet ER 20 milliEquivalent(s) Oral once  tamsulosin 0.4 milliGRAM(s) Oral at bedtime    MEDICATIONS  (PRN):  acetaminophen   Tablet. 325 milliGRAM(s) Oral every 4 hours PRN Moderate Pain (4 - 6)  aluminum hydroxide/magnesium hydroxide/simethicone Suspension 30 milliLiter(s) Oral every 4 hours PRN Dyspepsia  bisacodyl Suppository 10 milliGRAM(s) Rectal daily PRN Constipation      ALLERGIES:  Allergies    No Known Allergies    Intolerances        LABS:                        11.6   5.7   )-----------( 184      ( 09 Apr 2018 06:30 )             37.3     04-09    141  |  101  |  24<H>  ----------------------------<  125<H>  3.8   |  26  |  1.07    Ca    9.5      09 Apr 2018 06:30  Mg     2.2     04-09      PT/INR - ( 09 Apr 2018 06:30 )   PT: 16.3 sec;   INR: 1.46          PTT - ( 09 Apr 2018 06:30 )  PTT:94.9 sec      RADIOLOGY & ADDITIONAL TESTS:     Cxray: reaccumulation of right pleural effusion  Tele: afib  EKG: afib w/ RBBB

## 2018-04-09 NOTE — PHYSICAL THERAPY INITIAL EVALUATION ADULT - ADDITIONAL COMMENTS
Patient lives with spouse on the third floor of building without elevator. Claims was able to ambulate long distances and negotiate 3 flights with standing rests. Have never used any Assistive Devices. Has home attendant every day for 12 hours. Children help when can. No reports of fall.

## 2018-04-09 NOTE — PROGRESS NOTE ADULT - SUBJECTIVE AND OBJECTIVE BOX
INTERVAL HISTORY:  w/u for TAVR in progress  CHF- continue to diurese  	  MEDICATIONS:  furosemide   Injectable 40 milliGRAM(s) IV Push every 12 hours  tamsulosin 0.4 milliGRAM(s) Oral at bedtime  acetaminophen   Tablet. 325 milliGRAM(s) Oral every 4 hours PRN    aluminum hydroxide/magnesium hydroxide/simethicone Suspension 30 milliLiter(s) Oral every 4 hours PRN  bisacodyl Suppository 10 milliGRAM(s) Rectal daily PRN  pantoprazole    Tablet 40 milliGRAM(s) Oral before breakfast    atorvastatin 20 milliGRAM(s) Oral at bedtime    aspirin enteric coated 81 milliGRAM(s) Oral daily  heparin  Infusion 700 Unit(s)/Hr IV Continuous <Continuous>  potassium chloride    Tablet ER 20 milliEquivalent(s) Oral once        PHYSICAL EXAM:  T(C): 36.5 (04-09-18 @ 05:07), Max: 37.2 (04-08-18 @ 10:00)  HR: 71 (04-09-18 @ 05:41) (70 - 86)  BP: 148/75 (04-09-18 @ 05:10) (122/71 - 154/75)  RR: 19 (04-09-18 @ 05:10) (15 - 23)  SpO2: 97% (04-09-18 @ 05:41) (95% - 99%)  Wt(kg): --  I&O's Summary    08 Apr 2018 07:01  -  09 Apr 2018 07:00  --------------------------------------------------------  IN: 572 mL / OUT: 2410 mL / NET: -1838 mL          Appearance: Normal	  HEENT:   Normal oral mucosa, PERRL, EOMI	  Lymphatic: No lymphadenopathy  Cardiovascular: Irregular, variable S1, reduced S2, No JVD, AS murmur, + edema  Respiratory: Lungs clear to auscultation	  Psychiatry: A & O, Mood & affect appropriate  Gastrointestinal:  Soft, Non-tender, + BS	  Skin: No rashes, No ecchymoses, No cyanosis  Neurologic: Non-focal  Extremities: Normal range of motion, No clubbing, cyanosis, + edema      TELEMETRY: 	    ECG:  	  RADIOLOGY:   DIAGNOSTIC TESTING:  [ ] Echocardiogram:  [ ]  Catheterization:  [ ] Stress Test:    OTHER: 	    LABS:	 	    CARDIAC MARKERS:                                  11.6   5.7   )-----------( 184      ( 09 Apr 2018 06:30 )             37.3     04-09    141  |  101  |  24<H>  ----------------------------<  125<H>  3.8   |  26  |  1.07    Ca    9.5      09 Apr 2018 06:30  Mg     2.2     04-09      proBNP:   Lipid Profile:   HgA1c:   TSH:     ASSESSMENT/PLAN:

## 2018-04-09 NOTE — PHYSICAL THERAPY INITIAL EVALUATION ADULT - PERTINENT HX OF CURRENT PROBLEM, REHAB EVAL
Patient is an 89 year old male with past medical history of Coronary Artery Disease (S/P CABG), Systolic Congestive Heart Failure, Atrial Fibrillation, Aortic Stenosis, Mitral Regurgitation, BPH Admitted for Congestive Heart Failure and Aortic Stenosis Management. Scheduled for TAVR today. Hospital day # 5 Patient is an 89 year old male (Polish/Guyanese Speaking) with past medical history of Coronary Artery Disease (S/P CABG), Systolic Congestive Heart Failure, Atrial Fibrillation, Aortic Stenosis, Mitral Regurgitation, BPH Admitted for Congestive Heart Failure and Aortic Stenosis Management. Scheduled for TAVR today. Hospital day # 5

## 2018-04-09 NOTE — PROGRESS NOTE ADULT - PROBLEM SELECTOR PLAN 2
Patient with Acute on Chronic Decompensated CHF. Echocardiogram with EF 30-35%.   -Patient Net Positive Over Last 24 Hours 304 mL. Weight Increased 65-65.5kg. No Lasix Given on 4/7/18. Ordered for Lasix 40mg IV x 1 in AM Today. Will Need to Be More Aggressive with Diuresis as Tolerated in Setting of Severe Aortic Stenosis. Goal Net Negative 1-2L Daily.   -Holding Beta Blocker, ACE-I/ARB in Setting of Severe AS.   -Strict I/Os, Daily Standing Weights.   -Once Optimized, Will Need To Perform Cardiac Catheterization to Determine if Worsening Cardiomyopathy is Ischemic in Setting of CAD History. Patient with Acute on Chronic Decompensated CHF. Echocardiogram with EF 30-35%.   - c/w lasix 40 mg IVP BID  -Holding Beta Blocker, ACE-I/ARB in Setting of Severe AS.   - Strict I/Os, Daily Standing Weights.   - Goal 1-2L net negative   - Once Optimized, Will Need To Perform Cardiac Catheterization to Determine if Worsening Cardiomyopathy is Ischemic in Setting of CAD History.

## 2018-04-09 NOTE — PROGRESS NOTE ADULT - SUBJECTIVE AND OBJECTIVE BOX
Patient discussed on morning rounds with Gail Thacker and Klarissa    SUBJECTIVE ASSESSMENT:  89y Male with PMH significant for CAD (s/p CABG), systolic CHF, a-fib, aortic stenosis, mitral regurgitation and BPH presented to [facility] with findings of aortic stenosis requiring for intervention.      At visit, patient is comfortable with nasal cannula at 1L and sating at 99%.  Afebrile and hemodynamically stable.  He is scheduled for further pre-op work up and he remains NPO.      Vital Signs Last 24 Hrs  T(C): 36.6 (09 Apr 2018 08:39), Max: 37.2 (08 Apr 2018 18:44)  T(F): 97.8 (09 Apr 2018 08:39), Max: 99 (08 Apr 2018 18:44)  HR: 78 (09 Apr 2018 12:55) (70 - 87)  BP: 147/73 (09 Apr 2018 12:55) (77/57 - 168/91)  BP(mean): 112 (09 Apr 2018 12:55) (75 - 120)  RR: 16 (09 Apr 2018 12:55) (15 - 23)  SpO2: 94% (09 Apr 2018 12:55) (94% - 99%)  I&O's Detail    08 Apr 2018 07:01  -  09 Apr 2018 07:00  --------------------------------------------------------  IN:    heparin Infusion: 32 mL    heparin Infusion: 90 mL    Oral Fluid: 450 mL  Total IN: 572 mL    OUT:    Voided: 2410 mL  Total OUT: 2410 mL    Total NET: -1838 mL      09 Apr 2018 07:01  -  09 Apr 2018 13:50  --------------------------------------------------------  IN:    heparin Infusion: 28 mL  Total IN: 28 mL    OUT:    Voided: 200 mL  Total OUT: 200 mL    Total NET: -172 mL      PHYSICAL EXAM:    General: NAD at nasal cannula.  Patient is on heparin drip    Neurological: grossly intact    Cardiovascular: a-fib with controlled rate    Respiratory: no wheezing and no rhonchi    Gastrointestinal: bowel movement and bowel sound intact    Extremities: slightly edema (+1)    Vascular: pulses are intact    LABS:                        11.6   5.7   )-----------( 184      ( 09 Apr 2018 06:30 )             37.3     PT/INR - ( 09 Apr 2018 06:30 )   PT: 16.3 sec;   INR: 1.46     PTT - ( 09 Apr 2018 13:00 )  PTT: 82.7 sec    04-09    141  |  101  |  24<H>  ----------------------------<  125<H>  3.8   |  26  |  1.07    Ca    9.5      09 Apr 2018 06:30  Mg     2.2     04-09    MEDICATIONS  (STANDING):  aspirin enteric coated 81 milliGRAM(s) Oral daily  atorvastatin 20 milliGRAM(s) Oral at bedtime  furosemide   Injectable 40 milliGRAM(s) IV Push every 12 hours  heparin  Infusion 700 Unit(s)/Hr (7 mL/Hr) IV Continuous <Continuous>  pantoprazole    Tablet 40 milliGRAM(s) Oral before breakfast  tamsulosin 0.4 milliGRAM(s) Oral at bedtime    MEDICATIONS  (PRN):  acetaminophen   Tablet. 325 milliGRAM(s) Oral every 4 hours PRN Moderate Pain (4 - 6)  aluminum hydroxide/magnesium hydroxide/simethicone Suspension 30 milliLiter(s) Oral every 4 hours PRN Dyspepsia  bisacodyl Suppository 10 milliGRAM(s) Rectal daily PRN Constipation      RADIOLOGY & ADDITIONAL TESTS:  CTA: pending     Echo  Irregular heart rate with various aortic gradient: the mean aortic valve   area between 46 and 32mmHg  Normal left ventricular size and wall   thickness. There is apical dyskinesis. The other walls are   hypokinetic. There is   mid inferolateral wall dyskinesis. The left ventricular ejection fraction   is   estimated to be 30-35%.  The left atrium is dilated. The mitral inflow   pattern is   consistent with restrictive left ventricular filling, suggestive of   severely   elevated left atrial pressure (>20mmHg).  Structurally normal mitral   valve. There is mild mitral regurgitation. Structurally normal tricuspid   valve. The pulmonary artery systolic pressure is estimated to be 80   mmHg. Structurally normal pulmonic valve. No aortic root dilatation. There   is no   pericardial effusion. Left pleural effusion noted. A complete   two-dimensional   transthoracic echocardiogram was performed (2D, M-mode, spectral and   color   flow doppler).  Contrast injection with Definity performed

## 2018-04-09 NOTE — PROGRESS NOTE ADULT - ASSESSMENT
89y Male with PMH significant for CAD (s/p CABG), systolic CHF, a-fib, aortic stenosis, mitral regurgitation and BPH presented to [facility] with findings of aortic stenosis requiring for intervention.  At visit, patient is comfortable with nasal cannula at 1L and sating at 99%.  Afebrile and hemodynamically stable.  He is scheduled for further pre-op work up and he remains NPO.

## 2018-04-09 NOTE — PROGRESS NOTE ADULT - PROBLEM SELECTOR PLAN 2
Eliquis DCed and transitioned to Heparin in preparation for cath.  Will need to coordinate with Dr Cyr

## 2018-04-09 NOTE — PROGRESS NOTE ADULT - PROBLEM SELECTOR PLAN 1
Patient with History of Severe Aortic Stenosis. Noted on Echocardiogram Performed at [facility] Before Admission.   -Structural Heart Consulted, For Possible TAVR.   -Order for CTA Cardiac, CT Chest, Abdomen/Pelvis, US Duplex Bilateral Carotid Arteries.   -Will Order for PFTs Once Breathing Improves Further. Patient with History of Severe Aortic Stenosis. Noted on Echocardiogram Performed at [facility] Before Admission.   - Structural Heart Consulted, For Possible TAVR.   - Pending TAVR evaluation   - Will Order for PFTs Once Breathing Improves Further.

## 2018-04-10 PROBLEM — Z00.00 ENCOUNTER FOR PREVENTIVE HEALTH EXAMINATION: Noted: 2018-04-10

## 2018-04-10 LAB
ANION GAP SERPL CALC-SCNC: 11 MMOL/L — SIGNIFICANT CHANGE UP (ref 5–17)
APTT BLD: 34.6 SEC — SIGNIFICANT CHANGE UP (ref 27.5–37.4)
APTT BLD: 79.7 SEC — HIGH (ref 27.5–37.4)
APTT BLD: 98.8 SEC — HIGH (ref 27.5–37.4)
B PERT IGG+IGM PNL SER: CLEAR — SIGNIFICANT CHANGE UP
BUN SERPL-MCNC: 26 MG/DL — HIGH (ref 7–23)
CALCIUM SERPL-MCNC: 9.6 MG/DL — SIGNIFICANT CHANGE UP (ref 8.4–10.5)
CHLORIDE SERPL-SCNC: 99 MMOL/L — SIGNIFICANT CHANGE UP (ref 96–108)
CO2 SERPL-SCNC: 28 MMOL/L — SIGNIFICANT CHANGE UP (ref 22–31)
COLOR FLD: YELLOW — SIGNIFICANT CHANGE UP
CREAT SERPL-MCNC: 1.12 MG/DL — SIGNIFICANT CHANGE UP (ref 0.5–1.3)
FLUID INTAKE SUBSTANCE CLASS: SIGNIFICANT CHANGE UP
FLUID SEGMENTED GRANULOCYTES: 23 % — SIGNIFICANT CHANGE UP
GLUCOSE FLD-MCNC: 128 MG/DL — SIGNIFICANT CHANGE UP
GLUCOSE SERPL-MCNC: 119 MG/DL — HIGH (ref 70–99)
GRAM STN FLD: SIGNIFICANT CHANGE UP
HCT VFR BLD CALC: 35.9 % — LOW (ref 39–50)
HGB BLD-MCNC: 11.1 G/DL — LOW (ref 13–17)
INR BLD: 1.58 — HIGH (ref 0.88–1.16)
LDH SERPL L TO P-CCNC: 68 U/L — SIGNIFICANT CHANGE UP
LYMPHOCYTES # FLD: 69 % — SIGNIFICANT CHANGE UP
MAGNESIUM SERPL-MCNC: 2.2 MG/DL — SIGNIFICANT CHANGE UP (ref 1.6–2.6)
MCHC RBC-ENTMCNC: 30.9 G/DL — LOW (ref 32–36)
MCHC RBC-ENTMCNC: 32.5 PG — SIGNIFICANT CHANGE UP (ref 27–34)
MCV RBC AUTO: 105 FL — HIGH (ref 80–100)
MONOS+MACROS # FLD: 8 % — SIGNIFICANT CHANGE UP
PLATELET # BLD AUTO: 171 K/UL — SIGNIFICANT CHANGE UP (ref 150–400)
POTASSIUM SERPL-MCNC: 4.1 MMOL/L — SIGNIFICANT CHANGE UP (ref 3.5–5.3)
POTASSIUM SERPL-SCNC: 4.1 MMOL/L — SIGNIFICANT CHANGE UP (ref 3.5–5.3)
PROTHROM AB SERPL-ACNC: 17.7 SEC — HIGH (ref 9.8–12.7)
RBC # BLD: 3.42 M/UL — LOW (ref 4.2–5.8)
RBC # FLD: 15 % — SIGNIFICANT CHANGE UP (ref 10.3–16.9)
RCV VOL RI: 100 /UL — HIGH (ref 0–5)
SODIUM SERPL-SCNC: 138 MMOL/L — SIGNIFICANT CHANGE UP (ref 135–145)
SPECIMEN SOURCE FLD: SIGNIFICANT CHANGE UP
SPECIMEN SOURCE: SIGNIFICANT CHANGE UP
TOTAL NUCLEATED CELL COUNT, BODY FLUID: 104 /UL — HIGH (ref 0–5)
TUBE TYPE: SIGNIFICANT CHANGE UP
WBC # BLD: 5.6 K/UL — SIGNIFICANT CHANGE UP (ref 3.8–10.5)
WBC # FLD AUTO: 5.6 K/UL — SIGNIFICANT CHANGE UP (ref 3.8–10.5)

## 2018-04-10 PROCEDURE — 32555 ASPIRATE PLEURA W/ IMAGING: CPT | Mod: 50

## 2018-04-10 PROCEDURE — 99232 SBSQ HOSP IP/OBS MODERATE 35: CPT

## 2018-04-10 PROCEDURE — 93010 ELECTROCARDIOGRAM REPORT: CPT

## 2018-04-10 PROCEDURE — 94010 BREATHING CAPACITY TEST: CPT | Mod: 26

## 2018-04-10 PROCEDURE — 71045 X-RAY EXAM CHEST 1 VIEW: CPT | Mod: 26,77

## 2018-04-10 PROCEDURE — 71045 X-RAY EXAM CHEST 1 VIEW: CPT | Mod: 26

## 2018-04-10 RX ORDER — CHLORHEXIDINE GLUCONATE 213 G/1000ML
1 SOLUTION TOPICAL ONCE
Qty: 0 | Refills: 0 | Status: COMPLETED | OUTPATIENT
Start: 2018-04-10 | End: 2018-04-10

## 2018-04-10 RX ORDER — ACETAMINOPHEN 500 MG
650 TABLET ORAL ONCE
Qty: 0 | Refills: 0 | Status: COMPLETED | OUTPATIENT
Start: 2018-04-10 | End: 2018-04-10

## 2018-04-10 RX ORDER — HEPARIN SODIUM 5000 [USP'U]/ML
750 INJECTION INTRAVENOUS; SUBCUTANEOUS
Qty: 25000 | Refills: 0 | Status: DISCONTINUED | OUTPATIENT
Start: 2018-04-10 | End: 2018-04-11

## 2018-04-10 RX ORDER — CHLORHEXIDINE GLUCONATE 213 G/1000ML
5 SOLUTION TOPICAL ONCE
Qty: 0 | Refills: 0 | Status: COMPLETED | OUTPATIENT
Start: 2018-04-10 | End: 2018-04-11

## 2018-04-10 RX ORDER — HEPARIN SODIUM 5000 [USP'U]/ML
750 INJECTION INTRAVENOUS; SUBCUTANEOUS
Qty: 25000 | Refills: 0 | Status: DISCONTINUED | OUTPATIENT
Start: 2018-04-10 | End: 2018-04-10

## 2018-04-10 RX ORDER — SENNA PLUS 8.6 MG/1
2 TABLET ORAL AT BEDTIME
Qty: 0 | Refills: 0 | Status: DISCONTINUED | OUTPATIENT
Start: 2018-04-10 | End: 2018-04-14

## 2018-04-10 RX ORDER — CHLORHEXIDINE GLUCONATE 213 G/1000ML
1 SOLUTION TOPICAL ONCE
Qty: 0 | Refills: 0 | Status: COMPLETED | OUTPATIENT
Start: 2018-04-10 | End: 2018-04-11

## 2018-04-10 RX ADMIN — HEPARIN SODIUM 7.5 UNIT(S)/HR: 5000 INJECTION INTRAVENOUS; SUBCUTANEOUS at 06:00

## 2018-04-10 RX ADMIN — HEPARIN SODIUM 7.5 UNIT(S)/HR: 5000 INJECTION INTRAVENOUS; SUBCUTANEOUS at 19:04

## 2018-04-10 RX ADMIN — Medication 650 MILLIGRAM(S): at 20:45

## 2018-04-10 RX ADMIN — Medication 81 MILLIGRAM(S): at 10:39

## 2018-04-10 RX ADMIN — Medication 325 MILLIGRAM(S): at 14:17

## 2018-04-10 RX ADMIN — POLYETHYLENE GLYCOL 3350 17 GRAM(S): 17 POWDER, FOR SOLUTION ORAL at 10:39

## 2018-04-10 RX ADMIN — Medication 5 MILLIGRAM(S): at 13:57

## 2018-04-10 RX ADMIN — SENNA PLUS 2 TABLET(S): 8.6 TABLET ORAL at 21:52

## 2018-04-10 RX ADMIN — Medication 650 MILLIGRAM(S): at 22:00

## 2018-04-10 RX ADMIN — ATORVASTATIN CALCIUM 20 MILLIGRAM(S): 80 TABLET, FILM COATED ORAL at 21:52

## 2018-04-10 RX ADMIN — PANTOPRAZOLE SODIUM 40 MILLIGRAM(S): 20 TABLET, DELAYED RELEASE ORAL at 06:48

## 2018-04-10 RX ADMIN — TAMSULOSIN HYDROCHLORIDE 0.4 MILLIGRAM(S): 0.4 CAPSULE ORAL at 21:52

## 2018-04-10 RX ADMIN — Medication 325 MILLIGRAM(S): at 13:48

## 2018-04-10 RX ADMIN — CHLORHEXIDINE GLUCONATE 1 APPLICATION(S): 213 SOLUTION TOPICAL at 21:52

## 2018-04-10 RX ADMIN — Medication 40 MILLIGRAM(S): at 06:48

## 2018-04-10 NOTE — PROGRESS NOTE ADULT - SUBJECTIVE AND OBJECTIVE BOX
INTERVAL HISTORY:  discussed with Dr Cyr who will consider tapping the pleural effusions  	  MEDICATIONS:  furosemide   Injectable 40 milliGRAM(s) IV Push daily  tamsulosin 0.4 milliGRAM(s) Oral at bedtime        acetaminophen   Tablet. 325 milliGRAM(s) Oral every 4 hours PRN    aluminum hydroxide/magnesium hydroxide/simethicone Suspension 30 milliLiter(s) Oral every 4 hours PRN  bisacodyl Suppository 10 milliGRAM(s) Rectal daily PRN  pantoprazole    Tablet 40 milliGRAM(s) Oral before breakfast  polyethylene glycol 3350 17 Gram(s) Oral daily    atorvastatin 20 milliGRAM(s) Oral at bedtime    aspirin enteric coated 81 milliGRAM(s) Oral daily  heparin  Infusion 750 Unit(s)/Hr IV Continuous <Continuous>        PHYSICAL EXAM:  T(C): 36.7 (04-10-18 @ 05:21), Max: 36.8 (04-09-18 @ 14:01)  HR: 74 (04-10-18 @ 06:21) (72 - 88)  BP: 149/98 (04-10-18 @ 05:00) (77/57 - 168/91)  RR: 17 (04-10-18 @ 06:21) (16 - 17)  SpO2: 98% (04-10-18 @ 06:21) (94% - 100%)  Wt(kg): --  I&O's Summary    09 Apr 2018 07:01  -  10 Apr 2018 07:00  --------------------------------------------------------  IN: 394.5 mL / OUT: 1275 mL / NET: -880.5 mL          Appearance: Normal	  HEENT:   Normal oral mucosa, PERRL, EOMI	  Lymphatic: No lymphadenopathy  Cardiovascular: Irregular, variable S1, reduced S2, No JVD, AS murmur, + edema  Respiratory: Lungs with coarse BS  Psychiatry: A & O x 3, Mood & affect appropriate  Gastrointestinal:  Soft, Non-tender, + BS	  Skin: No rashes, No ecchymoses, No cyanosis  Neurologic: Non-focal  Extremities: Normal range of motion, No clubbing, cyanosis, +  edema  Vascular: Peripheral pulses palpable 2+ bilaterally    TELEMETRY: 	    ECG:  	  RADIOLOGY:   DIAGNOSTIC TESTING:  [ ] Echocardiogram:  [ ]  Catheterization:  [ ] Stress Test:    OTHER: 	    LABS:	 	    CARDIAC MARKERS:                                  11.1   5.6   )-----------( 171      ( 10 Apr 2018 02:26 )             35.9     04-10    138  |  99  |  26<H>  ----------------------------<  119<H>  4.1   |  28  |  1.12    Ca    9.6      10 Apr 2018 02:28  Mg     2.2     04-10      proBNP:   Lipid Profile:   HgA1c:   TSH:     ASSESSMENT/PLAN:

## 2018-04-10 NOTE — PROGRESS NOTE ADULT - PROBLEM SELECTOR PLAN 1
Patient with History of Severe Aortic Stenosis. Noted on Echocardiogram Performed at [facility] Before Admission.   - Structural Heart Consulted, For Possible TAVR.  Dr Cyr to see today and consider thoracentesis prior to TAVR  - Pending TAVR evaluation   - Will Order for PFTs Once Breathing Improves Further.

## 2018-04-10 NOTE — PROGRESS NOTE ADULT - ASSESSMENT
Patient wants to discuss getting blood transfusion with his wife and sons before giving consent for the procedure.    Need Consent.

## 2018-04-10 NOTE — PROGRESS NOTE ADULT - PROBLEM SELECTOR PLAN 2
Patient with Acute on Chronic Decompensated CHF. Echocardiogram with EF 30-35%.   - c/w lasix 40 mg IVP BID  -Holding Beta Blocker, ACE-I/ARB in Setting of Severe AS.   - Strict I/Os, Daily Standing Weights.   - Goal 1-2L net negative   - Once Optimized, Will Need To Perform Cardiac Catheterization to Determine if Worsening Cardiomyopathy is Ischemic in Setting of CAD History.

## 2018-04-10 NOTE — PROGRESS NOTE ADULT - PROBLEM SELECTOR PLAN 1
Patient with History of Severe Aortic Stenosis. Noted on Echocardiogram Performed at [facility] Before Admission.   - Structural Heart Consulted  - completed carotid dopplers, CT head, CT congenital chest, CT abd/pelvis   - Pending TAVR evaluation - PFTs

## 2018-04-10 NOTE — DIETITIAN INITIAL EVALUATION ADULT. - PROBLEM SELECTOR PLAN 1
Patient with History of Severe Aortic Stenosis. Noted on Echocardiogram Performed at [facility] Before Admission.   -Structural Heart Consulted, Follow-Up Recommendations.

## 2018-04-10 NOTE — PROGRESS NOTE ADULT - SUBJECTIVE AND OBJECTIVE BOX
Planned Date of Surgery: 4/10/18                                                                                                            Surgeon: Ancelmo Cyr    Procedure: BAV     HPI:  89 year old male    PAST MEDICAL & SURGICAL HISTORY:  Enlarged prostate  High cholesterol  GERD (gastroesophageal reflux disease)  MI (myocardial infarction)  CHF (congestive heart failure)  Afib  S/P CABG (coronary artery bypass graft): 19 years ago @ [facility]    No Known Allergies    MEDICATIONS  (STANDING):  aspirin enteric coated 81 milliGRAM(s) Oral daily  atorvastatin 20 milliGRAM(s) Oral at bedtime  chlorhexidine 0.12% Liquid 5 milliLiter(s) Swish and Spit once  chlorhexidine 4% Liquid 1 Application(s) Topical once  chlorhexidine 4% Liquid 1 Application(s) Topical once  furosemide   Injectable 40 milliGRAM(s) IV Push daily  heparin  Infusion 750 Unit(s)/Hr (7.5 mL/Hr) IV Continuous <Continuous>  pantoprazole    Tablet 40 milliGRAM(s) Oral before breakfast  polyethylene glycol 3350 17 Gram(s) Oral daily  senna 2 Tablet(s) Oral at bedtime  tamsulosin 0.4 milliGRAM(s) Oral at bedtime    MEDICATIONS  (PRN):  acetaminophen   Tablet. 325 milliGRAM(s) Oral every 4 hours PRN Moderate Pain (4 - 6)  aluminum hydroxide/magnesium hydroxide/simethicone Suspension 30 milliLiter(s) Oral every 4 hours PRN Dyspepsia  bisacodyl Suppository 10 milliGRAM(s) Rectal daily PRN Constipation      On Beta Blocker?  NO / CONTRAINDICATED Reason orthostatic hypotension    Labs:                        11.1   5.6   )-----------( 171      ( 10 Apr 2018 02:26 )             35.9     04-10    138  |  99  |  26<H>  ----------------------------<  119<H>  4.1   |  28  |  1.12    Ca    9.6      10 Apr 2018 02:28  Mg     2.2     04-10    PT/INR - ( 10 Apr 2018 02:27 )   PT: 17.7 sec;   INR: 1.58        PTT - ( 10 Apr 2018 11:11 )  PTT:79.7 sec    ABO Interpretation: A (04-08-18 @ 03:37)    Hgb A1C: 5.8  TSH: 2.7  Troponin: 0.03  BNP: 34853    EKG: < from: 12 Lead ECG (04.10.18 @ 07:48) >  Diagnosis Line Atrial fibrillation with premature ventricular or aberrantly conducted complexes  Right bundle branch block Left anterior fascicular block *** Bifascicular block ***  Cannot rule out Inferior infarct , age undetermined Anterolateral infarct , age undetermined  Nonspecific ST - T abnormalities    < end of copied text >      CXR:< from: Xray Chest 1 View- PORTABLE-Routine (04.10.18 @ 07:05) >  INTERPRETATION:  Indication: Interval Change    A single portable view of the chest is submitted. Comparison is made to   the most recent prior study dated 4/9/2018. Bilateral effusions and   infiltrates persist. No significant change is appreciated.   Impression:    No significant interval change    < end of copied text >      CT Scans:  < from: CT Head No Cont (04.09.18 @ 18:14) >  Impression: Mild to moderate microvascular disease. Few tiny basal   ganglia chronic lacunar infarcts. No evidence of acute intracranial   injury.    < end of copied text >    < from: CT Heart Congenital w/ IV Cont (04.09.18 @ 18:14) >  Impression: 1. Study not optimized for coronary evaluation.  2. Patent LIMA to LAD  3. Occluded SVG to marginal branch.  4. Occluded SVG to RCA.  5. Severe native coronary artery disease.  6.  Difficultto evaluate stent in RCA due to severe motion artifact.    < end of copied text >        Cath Report:    Echo: < from: TTE Echo w/Cont Complete (04.05.18 @ 09:32) >  Irregular heart rate with various aortic gradient: the mean aortic valve area between 46 and 32mmHg  Normal left ventricular size and wall  thickness.There is apical dyskinesis.The other walls are hypokinetic.There is mid inferolateral wall dyskinesis.The left ventricular ejection fraction   is estimated to be 30-35%The left atrium is dilated.The mitral inflow pattern isconsistent with restrictive left ventricular filling, suggestive of   severely elevated left atrial pressure (>20mmHg).  Structurally normal mitral valve.There is mild mitral regurgitation.Structurally normal tricuspid  valve.The pulmonary artery systolic pressure is estimated to be 80 mmHg.Structurally normal pulmonic valve.No aortic root dilatation.There   is no pericardial effusion.Left pleural effusion noted.    < end of copied text >      PFT's: please refer to chart    Carotid Duplex:< from: US Duplex Carotid Arteries Complete, Bilateral (04.08.18 @ 15:49) >    IMPRESSION:  1.  Findings consistent with greater than 70% stenosis of the right internal carotid artery. 2.  No hemodynamically significant left carotid stenosis.  3.  Severe calcified atheromatous plaque.    < end of copied text >      Consult in Chart?  YES   Consent in Chart? YES   Pre-op Orders Placed? YES   Blood Prodeucts Ordered? YES   NPO ordered? YES / NO Planned Date of Surgery: 4/10/18                                                                                                            Surgeon: Ancelmo Cyr    Procedure: BAV     HPI:  This is an 89 year old Nigerien speaking male poor historian, with a past medical history of CAD s/p CABG in 2001, afib on eliquis, AS, MR, CHF, incarcerated inguinal hernia that was sent to [facility] ED for a newly diagnosed pleural effusion. Patient initially presented to [facility] for a planned inguinal hernia repair where he was noted to be short of breath, an echo was performed and he was found to have a pleural effusion. Patient complains of worsening shortness of breath over the last 2 months, he states that he used to sleep with 1 pillow but now he sleeps with 2-3 but still wakes up at night short of breath. He denies limitation with activity and just yesterday was still able to walk up his 3 flights of stairs without needing to rest. Of note, the patient has been admitted to [facility] and [facility] 3 times since the summer of 2017 for "fluid in his legs and lungs", the most recent being 2 months ago at [facility]. Patient was medical optimized by diuresing negative 5L since admission and today is scheduled for thoracentesis with IR.    PAST MEDICAL & SURGICAL HISTORY:  Enlarged prostate  High cholesterol  GERD (gastroesophageal reflux disease)  MI (myocardial infarction)  CHF (congestive heart failure)  Afib  S/P CABG (coronary artery bypass graft): 19 years ago @ [facility]    No Known Allergies    MEDICATIONS  (STANDING):  aspirin enteric coated 81 milliGRAM(s) Oral daily  atorvastatin 20 milliGRAM(s) Oral at bedtime  chlorhexidine 0.12% Liquid 5 milliLiter(s) Swish and Spit once  chlorhexidine 4% Liquid 1 Application(s) Topical once  chlorhexidine 4% Liquid 1 Application(s) Topical once  furosemide   Injectable 40 milliGRAM(s) IV Push daily  heparin  Infusion 750 Unit(s)/Hr (7.5 mL/Hr) IV Continuous <Continuous>  pantoprazole    Tablet 40 milliGRAM(s) Oral before breakfast  polyethylene glycol 3350 17 Gram(s) Oral daily  senna 2 Tablet(s) Oral at bedtime  tamsulosin 0.4 milliGRAM(s) Oral at bedtime    MEDICATIONS  (PRN):  acetaminophen   Tablet. 325 milliGRAM(s) Oral every 4 hours PRN Moderate Pain (4 - 6)  aluminum hydroxide/magnesium hydroxide/simethicone Suspension 30 milliLiter(s) Oral every 4 hours PRN Dyspepsia  bisacodyl Suppository 10 milliGRAM(s) Rectal daily PRN Constipation      On Beta Blocker?  NO / CONTRAINDICATED Reason orthostatic hypotension    Labs:                        11.1   5.6   )-----------( 171      ( 10 Apr 2018 02:26 )             35.9     04-10    138  |  99  |  26<H>  ----------------------------<  119<H>  4.1   |  28  |  1.12    Ca    9.6      10 Apr 2018 02:28  Mg     2.2     04-10    PT/INR - ( 10 Apr 2018 02:27 )   PT: 17.7 sec;   INR: 1.58        PTT - ( 10 Apr 2018 11:11 )  PTT:79.7 sec    ABO Interpretation: A (04-08-18 @ 03:37)    Hgb A1C: 5.8  TSH: 2.7  Troponin: 0.03  BNP: 11553    EKG: < from: 12 Lead ECG (04.10.18 @ 07:48) >  Diagnosis Line Atrial fibrillation with premature ventricular or aberrantly conducted complexes  Right bundle branch block Left anterior fascicular block *** Bifascicular block ***  Cannot rule out Inferior infarct , age undetermined Anterolateral infarct , age undetermined  Nonspecific ST - T abnormalities    < end of copied text >      CXR:< from: Xray Chest 1 View- PORTABLE-Routine (04.10.18 @ 07:05) >  INTERPRETATION:  Indication: Interval Change    A single portable view of the chest is submitted. Comparison is made to   the most recent prior study dated 4/9/2018. Bilateral effusions and   infiltrates persist. No significant change is appreciated.   Impression:    No significant interval change    < end of copied text >      CT Scans:  < from: CT Head No Cont (04.09.18 @ 18:14) >  Impression: Mild to moderate microvascular disease. Few tiny basal   ganglia chronic lacunar infarcts. No evidence of acute intracranial   injury.    < end of copied text >    < from: CT Heart Congenital w/ IV Cont (04.09.18 @ 18:14) >  Impression: 1. Study not optimized for coronary evaluation.  2. Patent LIMA to LAD  3. Occluded SVG to marginal branch.  4. Occluded SVG to RCA.  5. Severe native coronary artery disease.  6.  Difficultto evaluate stent in RCA due to severe motion artifact.    < end of copied text >      Cath Report: no recent report    Echo: < from: TTE Echo w/Cont Complete (04.05.18 @ 09:32) >  Irregular heart rate with various aortic gradient: the mean aortic valve area between 46 and 32mmHg  Normal left ventricular size and wall  thickness.There is apical dyskinesis.The other walls are hypokinetic.There is mid inferolateral wall dyskinesis.The left ventricular ejection fraction   is estimated to be 30-35%The left atrium is dilated.The mitral inflow pattern isconsistent with restrictive left ventricular filling, suggestive of   severely elevated left atrial pressure (>20mmHg).  Structurally normal mitral valve.There is mild mitral regurgitation.Structurally normal tricuspid  valve.The pulmonary artery systolic pressure is estimated to be 80 mmHg.Structurally normal pulmonic valve.No aortic root dilatation.There   is no pericardial effusion.Left pleural effusion noted.    < end of copied text >      PFT's: please refer to chart    Carotid Duplex:< from: US Duplex Carotid Arteries Complete, Bilateral (04.08.18 @ 15:49) >    IMPRESSION:  1.  Findings consistent with greater than 70% stenosis of the right internal carotid artery. 2.  No hemodynamically significant left carotid stenosis.  3.  Severe calcified atheromatous plaque.    < end of copied text >      Consult in Chart?  YES   Consent in Chart? YES   Pre-op Orders Placed? YES   Blood Prodeucts Ordered? YES   NPO ordered? YES Planned Date of Surgery: 4/10/18                                                                                                            Surgeon: Ancelmo Cyr    Procedure: BAV     HPI:  This is an 89 year old Greek speaking male poor historian, with a past medical history of CAD s/p CABG in 2001, afib on eliquis, AS, MR, CHF, incarcerated inguinal hernia that was sent to [facility] ED for a newly diagnosed pleural effusion. Patient initially presented to [facility] for a planned inguinal hernia repair where he was noted to be short of breath, an echo was performed and he was found to have a pleural effusion. Patient complains of worsening shortness of breath over the last 2 months, he states that he used to sleep with 1 pillow but now he sleeps with 2-3 but still wakes up at night short of breath. He denies limitation with activity and just yesterday was still able to walk up his 3 flights of stairs without needing to rest. Of note, the patient has been admitted to [facility] 3 times since the summer of 2017 for "fluid in his legs and lungs", the most recent being 2 months ago at [facility]. Patient was medical optimized by diuresing negative 5L since admission and today is scheduled for thoracentesis with IR.    PAST MEDICAL & SURGICAL HISTORY:  Enlarged prostate  High cholesterol  GERD (gastroesophageal reflux disease)  MI (myocardial infarction)  CHF (congestive heart failure)  Afib  S/P CABG (coronary artery bypass graft): 19 years ago @ [facility]    No Known Allergies    MEDICATIONS  (STANDING):  aspirin enteric coated 81 milliGRAM(s) Oral daily  atorvastatin 20 milliGRAM(s) Oral at bedtime  chlorhexidine 0.12% Liquid 5 milliLiter(s) Swish and Spit once  chlorhexidine 4% Liquid 1 Application(s) Topical once  chlorhexidine 4% Liquid 1 Application(s) Topical once  furosemide   Injectable 40 milliGRAM(s) IV Push daily  heparin  Infusion 750 Unit(s)/Hr (7.5 mL/Hr) IV Continuous <Continuous>  pantoprazole    Tablet 40 milliGRAM(s) Oral before breakfast  polyethylene glycol 3350 17 Gram(s) Oral daily  senna 2 Tablet(s) Oral at bedtime  tamsulosin 0.4 milliGRAM(s) Oral at bedtime    MEDICATIONS  (PRN):  acetaminophen   Tablet. 325 milliGRAM(s) Oral every 4 hours PRN Moderate Pain (4 - 6)  aluminum hydroxide/magnesium hydroxide/simethicone Suspension 30 milliLiter(s) Oral every 4 hours PRN Dyspepsia  bisacodyl Suppository 10 milliGRAM(s) Rectal daily PRN Constipation      On Beta Blocker?  NO / CONTRAINDICATED Reason orthostatic hypotension    Labs:                        11.1   5.6   )-----------( 171      ( 10 Apr 2018 02:26 )             35.9     04-10    138  |  99  |  26<H>  ----------------------------<  119<H>  4.1   |  28  |  1.12    Ca    9.6      10 Apr 2018 02:28  Mg     2.2     04-10    PT/INR - ( 10 Apr 2018 02:27 )   PT: 17.7 sec;   INR: 1.58        PTT - ( 10 Apr 2018 11:11 )  PTT:79.7 sec    ABO Interpretation: A (04-08-18 @ 03:37)    Hgb A1C: 5.8  TSH: 2.7  Troponin: 0.03  BNP: 28865    EKG: < from: 12 Lead ECG (04.10.18 @ 07:48) >  Diagnosis Line Atrial fibrillation with premature ventricular or aberrantly conducted complexes  Right bundle branch block Left anterior fascicular block *** Bifascicular block ***  Cannot rule out Inferior infarct , age undetermined Anterolateral infarct , age undetermined  Nonspecific ST - T abnormalities    < end of copied text >      CXR:< from: Xray Chest 1 View- PORTABLE-Routine (04.10.18 @ 07:05) >  INTERPRETATION:  Indication: Interval Change    A single portable view of the chest is submitted. Comparison is made to   the most recent prior study dated 4/9/2018. Bilateral effusions and   infiltrates persist. No significant change is appreciated.   Impression:    No significant interval change    < end of copied text >      CT Scans:  < from: CT Head No Cont (04.09.18 @ 18:14) >  Impression: Mild to moderate microvascular disease. Few tiny basal   ganglia chronic lacunar infarcts. No evidence of acute intracranial   injury.    < end of copied text >    < from: CT Heart Congenital w/ IV Cont (04.09.18 @ 18:14) >  Impression: 1. Study not optimized for coronary evaluation.  2. Patent LIMA to LAD  3. Occluded SVG to marginal branch.  4. Occluded SVG to RCA.  5. Severe native coronary artery disease.  6.  Difficultto evaluate stent in RCA due to severe motion artifact.    < end of copied text >      Cath Report: no recent report    Echo: < from: TTE Echo w/Cont Complete (04.05.18 @ 09:32) >  Irregular heart rate with various aortic gradient: the mean aortic valve area between 46 and 32mmHg  Normal left ventricular size and wall  thickness.There is apical dyskinesis.The other walls are hypokinetic.There is mid inferolateral wall dyskinesis.The left ventricular ejection fraction   is estimated to be 30-35%The left atrium is dilated.The mitral inflow pattern isconsistent with restrictive left ventricular filling, suggestive of   severely elevated left atrial pressure (>20mmHg).  Structurally normal mitral valve.There is mild mitral regurgitation.Structurally normal tricuspid  valve.The pulmonary artery systolic pressure is estimated to be 80 mmHg.Structurally normal pulmonic valve.No aortic root dilatation.There   is no pericardial effusion.Left pleural effusion noted.    < end of copied text >      PFT's: please refer to chart    Carotid Duplex:< from: US Duplex Carotid Arteries Complete, Bilateral (04.08.18 @ 15:49) >    IMPRESSION:  1.  Findings consistent with greater than 70% stenosis of the right internal carotid artery. 2.  No hemodynamically significant left carotid stenosis.  3.  Severe calcified atheromatous plaque.    < end of copied text >      Consult in Chart?  YES   Consent in Chart? No   Pre-op Orders Placed? YES   Blood Prodeucts Ordered? YES   NPO ordered? YES

## 2018-04-10 NOTE — PROGRESS NOTE ADULT - ASSESSMENT
89year old male with PMH significant for CAD (s/p CABG), systolic CHF, a-fib on eliquis, aortic stenosis, mitral regurgitation and BPH presented to [facility] with findings of aortic stenosis.     Plan  Possible BAV this week  continue to diurese towards normovolemia to optimize  recommend IR drainage bilateral pleural effusion  continue heparin gtt for afib  need bedside PFT's  medical management per primary team 89year old male with PMH significant for CAD (s/p CABG), systolic CHF, a-fib on eliquis, aortic stenosis, mitral regurgitation and BPH presented to [facility] with findings of aortic stenosis.     Plan  Possible BAV this week  continue to diurese towards normovolemia to optimize  recommend IR drainage bilateral pleural effusion  continue heparin gtt for afib  need bedside PFT's  laxative for constipation  medical management per primary team

## 2018-04-10 NOTE — DIETITIAN INITIAL EVALUATION ADULT. - PROBLEM SELECTOR PLAN 2
Patient with Reports of PND, on Examination with JVD, Bibasilar Rales, Lower Extremity Pitting Edema, Labs Demonstrating BNP 13470, CXR with Pulmonary Effusions. Likely Multifactorial, Patient with Decreased EF and Severe Aortic Stenosis.   -Patient on Lasix 40mg PO QD Outpatient as per Records Obtained from Cardiologist (Dr. Noyola), and Discussion with PCP.   -Will Dose for Lasix 40mg IV x 1, and Evaluate Response with Strict I/Os, Daily Weights.   -Some Records Indicate Patient is on BB and ARB, However, Unclear and Will Need To Confirm. Patient reports Was Told Not To Take Beta Blocker.

## 2018-04-10 NOTE — PROGRESS NOTE ADULT - SUBJECTIVE AND OBJECTIVE BOX
INTERVAL HPI/OVERNIGHT EVENTS: MONSE o/n.     SUBJECTIVE: Patient seen and examined at bedside, translation provided by [employer]. Patient reports discomfort with BiPap mask o/n and does not want to use it this evening. He reports that his breathing is at baseline. No new cardiopulmonary complaints. Denies pre syncope, feeling light headed. ROS otherwise negative.     OBJECTIVE:    VITAL SIGNS:  ICU Vital Signs Last 24 Hrs  T(C): 36.7 (10 Apr 2018 05:21), Max: 36.8 (09 Apr 2018 14:01)  T(F): 98.1 (10 Apr 2018 05:21), Max: 98.2 (09 Apr 2018 14:01)  HR: 74 (10 Apr 2018 06:21) (72 - 88)  BP: 149/98 (10 Apr 2018 05:00) (77/57 - 168/91)  BP(mean): 126 (10 Apr 2018 05:00) (88 - 126)  ABP: --  ABP(mean): --  RR: 17 (10 Apr 2018 06:21) (16 - 17)  SpO2: 98% (10 Apr 2018 06:21) (94% - 100%)        04-09 @ 07:01  -  04-10 @ 07:00  --------------------------------------------------------  IN: 394.5 mL / OUT: 1275 mL / NET: -880.5 mL      CAPILLARY BLOOD GLUCOSE          PHYSICAL EXAM:    General: NAD, resting comfortably in bed  HEENT: NC/AT; PERRL, clear conjunctiva, MMM  Neck: supple, no JVD  Respiratory: decreased breath sounds b/l at the bases, rales. saturating well on 2 L NC. No use of accessory muscles. Speaking in full sentences.   Cardiovascular: irregularly irregular, 3/6 systolic crescendo-decrescendo murmur heart best at RUSB w/ radiation to carotids.  Abdomen: soft, NT/ND; +BS x4. No palpable masses or organomegaly.   Extremities: WWP, 2+ peripheral pulses b/l; 1+ pitting edema b/l to knees.   Skin: normal color and turgor; no rash  Neurological: a&ox3, no focal deficits.     MEDICATIONS:  MEDICATIONS  (STANDING):  aspirin enteric coated 81 milliGRAM(s) Oral daily  atorvastatin 20 milliGRAM(s) Oral at bedtime  furosemide   Injectable 40 milliGRAM(s) IV Push daily  heparin  Infusion 750 Unit(s)/Hr (7.5 mL/Hr) IV Continuous <Continuous>  pantoprazole    Tablet 40 milliGRAM(s) Oral before breakfast  polyethylene glycol 3350 17 Gram(s) Oral daily  tamsulosin 0.4 milliGRAM(s) Oral at bedtime    MEDICATIONS  (PRN):  acetaminophen   Tablet. 325 milliGRAM(s) Oral every 4 hours PRN Moderate Pain (4 - 6)  aluminum hydroxide/magnesium hydroxide/simethicone Suspension 30 milliLiter(s) Oral every 4 hours PRN Dyspepsia  bisacodyl Suppository 10 milliGRAM(s) Rectal daily PRN Constipation      ALLERGIES:  Allergies    No Known Allergies    Intolerances        LABS:                        11.1   5.6   )-----------( 171      ( 10 Apr 2018 02:26 )             35.9     04-10    138  |  99  |  26<H>  ----------------------------<  119<H>  4.1   |  28  |  1.12    Ca    9.6      10 Apr 2018 02:28  Mg     2.2     04-10      PT/INR - ( 09 Apr 2018 06:30 )   PT: 16.3 sec;   INR: 1.46          PTT - ( 10 Apr 2018 02:27 )  PTT:98.8 sec      RADIOLOGY & ADDITIONAL TESTS:     Tele: no events, afib  Cxray: No significant changes to b/l pleural effusions or venous congestion  EKG: afib, RBBB. interior infarct, age indeterminate.

## 2018-04-10 NOTE — DIETITIAN INITIAL EVALUATION ADULT. - OTHER INFO
89 y/o male admitted with aortic stenosis/TAVR.No N/V/D or pain.Skin intact.Suspect no specific diet compliance PTA.Resides with wife.

## 2018-04-10 NOTE — PROGRESS NOTE ADULT - SUBJECTIVE AND OBJECTIVE BOX
Patient discussed on morning rounds with Dr. Cyr    SUBJECTIVE ASSESSMENT:  89 year old male who      Vital Signs Last 24 Hrs  T(C): 37 (10 Apr 2018 10:00), Max: 37 (10 Apr 2018 10:00)  T(F): 98.6 (10 Apr 2018 10:00), Max: 98.6 (10 Apr 2018 10:00)  HR: 96 (10 Apr 2018 12:07) (72 - 96)  BP: 97/56 (10 Apr 2018 12:07) (97/56 - 159/77)  BP(mean): 77 (10 Apr 2018 12:07) (77 - 126)  RR: 18 (10 Apr 2018 12:07) (16 - 18)  SpO2: 94% (10 Apr 2018 12:07) (94% - 100%)  I&O's Detail    09 Apr 2018 07:01  -  10 Apr 2018 07:00  --------------------------------------------------------  IN:    heparin Infusion: 98 mL    heparin Infusion: 22.5 mL    heparin Infusion: 24 mL    Oral Fluid: 250 mL  Total IN: 394.5 mL    OUT:    Voided: 1275 mL  Total OUT: 1275 mL    Total NET: -880.5 mL      10 Apr 2018 07:01  -  10 Apr 2018 12:22  --------------------------------------------------------  IN:    heparin Infusion: 30 mL    heparin Infusion: 15 mL  Total IN: 45 mL    OUT:    Voided: 600 mL  Total OUT: 600 mL    Total NET: -555 mL    CHEST TUBE:  No  AUDREY DRAIN: No.  EPICARDIAL WIRES: No.  TIE DOWNS: No.  GUTIERREZ: No.    PHYSICAL EXAM:    General:     Neurological:    Cardiovascular:    Respiratory:    Gastrointestinal:    Extremities:    Vascular:    Incision Sites:    LABS:                        11.1   5.6   )-----------( 171      ( 10 Apr 2018 02:26 )             35.9       COUMADIN: No. REASON: on Heparin gtt    PT/INR - ( 10 Apr 2018 02:27 )   PT: 17.7 sec;   INR: 1.58       PTT - ( 10 Apr 2018 11:11 )  PTT:79.7 sec    04-10    138  |  99  |  26<H>  ----------------------------<  119<H>  4.1   |  28  |  1.12    Ca    9.6      10 Apr 2018 02:28  Mg     2.2     04-10    MEDICATIONS  (STANDING):  aspirin enteric coated 81 milliGRAM(s) Oral daily  atorvastatin 20 milliGRAM(s) Oral at bedtime  furosemide   Injectable 40 milliGRAM(s) IV Push daily  heparin  Infusion 750 Unit(s)/Hr (7.5 mL/Hr) IV Continuous <Continuous>  pantoprazole    Tablet 40 milliGRAM(s) Oral before breakfast  polyethylene glycol 3350 17 Gram(s) Oral daily  tamsulosin 0.4 milliGRAM(s) Oral at bedtime    MEDICATIONS  (PRN):  acetaminophen   Tablet. 325 milliGRAM(s) Oral every 4 hours PRN Moderate Pain (4 - 6)  aluminum hydroxide/magnesium hydroxide/simethicone Suspension 30 milliLiter(s) Oral every 4 hours PRN Dyspepsia  bisacodyl Suppository 10 milliGRAM(s) Rectal daily PRN Constipation      RADIOLOGY & ADDITIONAL TESTS:  < from: Xray Chest 1 View- PORTABLE-Routine (04.10.18 @ 07:05) >  A single portable view of the chest is submitted. Comparison is made to   the most recent prior study dated 4/9/2018. Bilateral effusions and   infiltrates persist. No significant change is appreciated.   Impression:    No significant interval change    < end of copied text > Patient discussed on morning rounds with Dr. Cyr    SUBJECTIVE ASSESSMENT:  89 year old male who      Vital Signs Last 24 Hrs  T(C): 37 (10 Apr 2018 10:00), Max: 37 (10 Apr 2018 10:00)  T(F): 98.6 (10 Apr 2018 10:00), Max: 98.6 (10 Apr 2018 10:00)  HR: 96 (10 Apr 2018 12:07) (72 - 96)  BP: 97/56 (10 Apr 2018 12:07) (97/56 - 159/77)  BP(mean): 77 (10 Apr 2018 12:07) (77 - 126)  RR: 18 (10 Apr 2018 12:07) (16 - 18)  SpO2: 94% (10 Apr 2018 12:07) (94% - 100%)  I&O's Detail    09 Apr 2018 07:01  -  10 Apr 2018 07:00  --------------------------------------------------------  IN:    heparin Infusion: 98 mL    heparin Infusion: 22.5 mL    heparin Infusion: 24 mL    Oral Fluid: 250 mL  Total IN: 394.5 mL    OUT:    Voided: 1275 mL  Total OUT: 1275 mL    Total NET: -880.5 mL      10 Apr 2018 07:01  -  10 Apr 2018 12:22  --------------------------------------------------------  IN:    heparin Infusion: 30 mL    heparin Infusion: 15 mL  Total IN: 45 mL    OUT:    Voided: 600 mL  Total OUT: 600 mL    Total NET: -555 mL    CHEST TUBE:  No  AUDREY DRAIN: No.  EPICARDIAL WIRES: No.  TIE DOWNS: No.  GUTIERREZ: No.    PHYSICAL EXAM:    General: Patient seen bedside in [facility]    NEURO: alert and oriented x3, no gross deficits appreciated    CV:  irregular rate and rhythm, S1S2, 3/6 systolic murmur at LSB     RESPIRATORY: equal breath sounds, no rales, no rhonchi, no wheeze    GI:  soft, nontender, positive bowel sounds    EXTREMITIES:  no edema    VASCULAR:    all pulses intact 2+ (radial, femoral, DP)    INCISION: none      LABS:                        11.1   5.6   )-----------( 171      ( 10 Apr 2018 02:26 )             35.9       COUMADIN: No. REASON: on Heparin gtt    PT/INR - ( 10 Apr 2018 02:27 )   PT: 17.7 sec;   INR: 1.58       PTT - ( 10 Apr 2018 11:11 )  PTT:79.7 sec    04-10    138  |  99  |  26<H>  ----------------------------<  119<H>  4.1   |  28  |  1.12    Ca    9.6      10 Apr 2018 02:28  Mg     2.2     04-10    MEDICATIONS  (STANDING):  aspirin enteric coated 81 milliGRAM(s) Oral daily  atorvastatin 20 milliGRAM(s) Oral at bedtime  furosemide   Injectable 40 milliGRAM(s) IV Push daily  heparin  Infusion 750 Unit(s)/Hr (7.5 mL/Hr) IV Continuous <Continuous>  pantoprazole    Tablet 40 milliGRAM(s) Oral before breakfast  polyethylene glycol 3350 17 Gram(s) Oral daily  tamsulosin 0.4 milliGRAM(s) Oral at bedtime    MEDICATIONS  (PRN):  acetaminophen   Tablet. 325 milliGRAM(s) Oral every 4 hours PRN Moderate Pain (4 - 6)  aluminum hydroxide/magnesium hydroxide/simethicone Suspension 30 milliLiter(s) Oral every 4 hours PRN Dyspepsia  bisacodyl Suppository 10 milliGRAM(s) Rectal daily PRN Constipation      RADIOLOGY & ADDITIONAL TESTS:  < from: Xray Chest 1 View- PORTABLE-Routine (04.10.18 @ 07:05) >  A single portable view of the chest is submitted. Comparison is made to   the most recent prior study dated 4/9/2018. Bilateral effusions and   infiltrates persist. No significant change is appreciated.   Impression:    No significant interval change    < end of copied text > Patient discussed on morning rounds with Dr. Cyr    SUBJECTIVE ASSESSMENT:  89 year old male who complains of constipation and abdominal discomfort.  The nurse taking of the patient was informed that the patient needs laxative.    Vital Signs Last 24 Hrs  T(C): 37 (10 Apr 2018 10:00), Max: 37 (10 Apr 2018 10:00)  T(F): 98.6 (10 Apr 2018 10:00), Max: 98.6 (10 Apr 2018 10:00)  HR: 96 (10 Apr 2018 12:07) (72 - 96)  BP: 97/56 (10 Apr 2018 12:07) (97/56 - 159/77)  BP(mean): 77 (10 Apr 2018 12:07) (77 - 126)  RR: 18 (10 Apr 2018 12:07) (16 - 18)  SpO2: 94% (10 Apr 2018 12:07) (94% - 100%)  I&O's Detail    09 Apr 2018 07:01  -  10 Apr 2018 07:00  --------------------------------------------------------  IN:    heparin Infusion: 98 mL    heparin Infusion: 22.5 mL    heparin Infusion: 24 mL    Oral Fluid: 250 mL  Total IN: 394.5 mL    OUT:    Voided: 1275 mL  Total OUT: 1275 mL    Total NET: -880.5 mL      10 Apr 2018 07:01  -  10 Apr 2018 12:22  --------------------------------------------------------  IN:    heparin Infusion: 30 mL    heparin Infusion: 15 mL  Total IN: 45 mL    OUT:    Voided: 600 mL  Total OUT: 600 mL    Total NET: -555 mL    CHEST TUBE:  No  AUDREY DRAIN: No.  EPICARDIAL WIRES: No.  TIE DOWNS: No.  GUTIERREZ: No.    PHYSICAL EXAM:    General: Patient seen bedside in [facility]    NEURO: alert and oriented x3, no gross deficits appreciated    CV:   positive JVD, irregular rate and rhythm, S1S2, 3/6 systolic murmur at LSB     RESPIRATORY: equal breath sounds, no rales, no rhonchi, no wheeze    GI:  soft, nontender, positive bowel sounds    EXTREMITIES:  no edema    VASCULAR:    all pulses intact 2+ (radial, femoral, DP)    INCISION: none      LABS:                        11.1   5.6   )-----------( 171      ( 10 Apr 2018 02:26 )             35.9       COUMADIN: No. REASON: on Heparin gtt    PT/INR - ( 10 Apr 2018 02:27 )   PT: 17.7 sec;   INR: 1.58       PTT - ( 10 Apr 2018 11:11 )  PTT:79.7 sec    04-10    138  |  99  |  26<H>  ----------------------------<  119<H>  4.1   |  28  |  1.12    Ca    9.6      10 Apr 2018 02:28  Mg     2.2     04-10    MEDICATIONS  (STANDING):  aspirin enteric coated 81 milliGRAM(s) Oral daily  atorvastatin 20 milliGRAM(s) Oral at bedtime  furosemide   Injectable 40 milliGRAM(s) IV Push daily  heparin  Infusion 750 Unit(s)/Hr (7.5 mL/Hr) IV Continuous <Continuous>  pantoprazole    Tablet 40 milliGRAM(s) Oral before breakfast  polyethylene glycol 3350 17 Gram(s) Oral daily  tamsulosin 0.4 milliGRAM(s) Oral at bedtime    MEDICATIONS  (PRN):  acetaminophen   Tablet. 325 milliGRAM(s) Oral every 4 hours PRN Moderate Pain (4 - 6)  aluminum hydroxide/magnesium hydroxide/simethicone Suspension 30 milliLiter(s) Oral every 4 hours PRN Dyspepsia  bisacodyl Suppository 10 milliGRAM(s) Rectal daily PRN Constipation      RADIOLOGY & ADDITIONAL TESTS:  < from: Xray Chest 1 View- PORTABLE-Routine (04.10.18 @ 07:05) >  A single portable view of the chest is submitted. Comparison is made to   the most recent prior study dated 4/9/2018. Bilateral effusions and   infiltrates persist. No significant change is appreciated.   Impression:    No significant interval change    < end of copied text > Patient discussed on morning rounds with Dr. Cyr    SUBJECTIVE ASSESSMENT:  89 year old male who complains of constipation and abdominal discomfort.  The nurse taking of the patient was informed that the patient needs laxative.(Information received with the help of [place]  #[id number])    Vital Signs Last 24 Hrs  T(C): 37 (10 Apr 2018 10:00), Max: 37 (10 Apr 2018 10:00)  T(F): 98.6 (10 Apr 2018 10:00), Max: 98.6 (10 Apr 2018 10:00)  HR: 96 (10 Apr 2018 12:07) (72 - 96)  BP: 97/56 (10 Apr 2018 12:07) (97/56 - 159/77)  BP(mean): 77 (10 Apr 2018 12:07) (77 - 126)  RR: 18 (10 Apr 2018 12:07) (16 - 18)  SpO2: 94% (10 Apr 2018 12:07) (94% - 100%)  I&O's Detail    09 Apr 2018 07:01  -  10 Apr 2018 07:00  --------------------------------------------------------  IN:    heparin Infusion: 98 mL    heparin Infusion: 22.5 mL    heparin Infusion: 24 mL    Oral Fluid: 250 mL  Total IN: 394.5 mL    OUT:    Voided: 1275 mL  Total OUT: 1275 mL    Total NET: -880.5 mL      10 Apr 2018 07:01  -  10 Apr 2018 12:22  --------------------------------------------------------  IN:    heparin Infusion: 30 mL    heparin Infusion: 15 mL  Total IN: 45 mL    OUT:    Voided: 600 mL  Total OUT: 600 mL    Total NET: -555 mL    CHEST TUBE:  No  AUDREY DRAIN: No.  EPICARDIAL WIRES: No.  TIE DOWNS: No.  GUTIERREZ: No.    PHYSICAL EXAM:    General: Patient seen bedside in [facility]    NEURO: alert and oriented x3, no gross deficits appreciated    CV:   positive JVD, irregular rate and rhythm, S1S2, 3/6 systolic murmur at LSB     RESPIRATORY: equal breath sounds, no rales, no rhonchi, no wheeze    GI:  soft, nontender, positive bowel sounds    EXTREMITIES:  no edema    VASCULAR:    all pulses intact 2+ (radial, femoral, DP)    INCISION: none      LABS:                        11.1   5.6   )-----------( 171      ( 10 Apr 2018 02:26 )             35.9       COUMADIN: No. REASON: on Heparin gtt    PT/INR - ( 10 Apr 2018 02:27 )   PT: 17.7 sec;   INR: 1.58       PTT - ( 10 Apr 2018 11:11 )  PTT:79.7 sec    04-10    138  |  99  |  26<H>  ----------------------------<  119<H>  4.1   |  28  |  1.12    Ca    9.6      10 Apr 2018 02:28  Mg     2.2     04-10    MEDICATIONS  (STANDING):  aspirin enteric coated 81 milliGRAM(s) Oral daily  atorvastatin 20 milliGRAM(s) Oral at bedtime  furosemide   Injectable 40 milliGRAM(s) IV Push daily  heparin  Infusion 750 Unit(s)/Hr (7.5 mL/Hr) IV Continuous <Continuous>  pantoprazole    Tablet 40 milliGRAM(s) Oral before breakfast  polyethylene glycol 3350 17 Gram(s) Oral daily  tamsulosin 0.4 milliGRAM(s) Oral at bedtime    MEDICATIONS  (PRN):  acetaminophen   Tablet. 325 milliGRAM(s) Oral every 4 hours PRN Moderate Pain (4 - 6)  aluminum hydroxide/magnesium hydroxide/simethicone Suspension 30 milliLiter(s) Oral every 4 hours PRN Dyspepsia  bisacodyl Suppository 10 milliGRAM(s) Rectal daily PRN Constipation      RADIOLOGY & ADDITIONAL TESTS:  < from: Xray Chest 1 View- PORTABLE-Routine (04.10.18 @ 07:05) >  A single portable view of the chest is submitted. Comparison is made to   the most recent prior study dated 4/9/2018. Bilateral effusions and   infiltrates persist. No significant change is appreciated.   Impression:    No significant interval change    < end of copied text >

## 2018-04-10 NOTE — PROGRESS NOTE ADULT - PROBLEM SELECTOR PLAN 2
Patient with Acute on Chronic Decompensated CHF. Echocardiogram with EF 30-35%.   - decreased lasix to 40 mg IVP QD given orthostatic BP   -Holding Beta Blocker, ACE-I/ARB in Setting of Severe AS.   - Strict I/Os, Daily Standing Weights.   - Once Optimized, Will Need To Perform Cardiac Catheterization to Determine if Worsening Cardiomyopathy is Ischemic in Setting of CAD History.  - IR/Pulm consult for possible therapeutic thoracentesis as bridge to CATH/BAV/TAVR

## 2018-04-11 ENCOUNTER — APPOINTMENT (OUTPATIENT)
Dept: CARDIOTHORACIC SURGERY | Facility: HOSPITAL | Age: 83
End: 2018-04-11

## 2018-04-11 LAB
ALBUMIN SERPL ELPH-MCNC: 3.2 G/DL — LOW (ref 3.3–5)
ALP SERPL-CCNC: 92 U/L — SIGNIFICANT CHANGE UP (ref 40–120)
ALT FLD-CCNC: 19 U/L — SIGNIFICANT CHANGE UP (ref 10–45)
ANION GAP SERPL CALC-SCNC: 15 MMOL/L — SIGNIFICANT CHANGE UP (ref 5–17)
ANION GAP SERPL CALC-SCNC: 16 MMOL/L — SIGNIFICANT CHANGE UP (ref 5–17)
APTT BLD: 46.9 SEC — HIGH (ref 27.5–37.4)
APTT BLD: 71.3 SEC — HIGH (ref 27.5–37.4)
APTT BLD: >200 SEC — CRITICAL HIGH (ref 27.5–37.4)
AST SERPL-CCNC: 24 U/L — SIGNIFICANT CHANGE UP (ref 10–40)
BILIRUB DIRECT SERPL-MCNC: 0.5 MG/DL — HIGH (ref 0–0.2)
BILIRUB INDIRECT FLD-MCNC: 0.7 MG/DL — SIGNIFICANT CHANGE UP (ref 0.2–1)
BILIRUB SERPL-MCNC: 1.2 MG/DL — SIGNIFICANT CHANGE UP (ref 0.2–1.2)
BLD GP AB SCN SERPL QL: NEGATIVE — SIGNIFICANT CHANGE UP
BUN SERPL-MCNC: 28 MG/DL — HIGH (ref 7–23)
BUN SERPL-MCNC: 33 MG/DL — HIGH (ref 7–23)
CALCIUM SERPL-MCNC: 9.2 MG/DL — SIGNIFICANT CHANGE UP (ref 8.4–10.5)
CALCIUM SERPL-MCNC: 9.6 MG/DL — SIGNIFICANT CHANGE UP (ref 8.4–10.5)
CHLORIDE SERPL-SCNC: 97 MMOL/L — SIGNIFICANT CHANGE UP (ref 96–108)
CHLORIDE SERPL-SCNC: 98 MMOL/L — SIGNIFICANT CHANGE UP (ref 96–108)
CK MB CFR SERPL CALC: 2.2 NG/ML — SIGNIFICANT CHANGE UP (ref 0–6.7)
CK SERPL-CCNC: 64 U/L — SIGNIFICANT CHANGE UP (ref 30–200)
CO2 SERPL-SCNC: 25 MMOL/L — SIGNIFICANT CHANGE UP (ref 22–31)
CO2 SERPL-SCNC: 27 MMOL/L — SIGNIFICANT CHANGE UP (ref 22–31)
CREAT SERPL-MCNC: 1.08 MG/DL — SIGNIFICANT CHANGE UP (ref 0.5–1.3)
CREAT SERPL-MCNC: 1.28 MG/DL — SIGNIFICANT CHANGE UP (ref 0.5–1.3)
GAS PNL BLDA: SIGNIFICANT CHANGE UP
GAS PNL BLDA: SIGNIFICANT CHANGE UP
GLUCOSE SERPL-MCNC: 118 MG/DL — HIGH (ref 70–99)
GLUCOSE SERPL-MCNC: 121 MG/DL — HIGH (ref 70–99)
HCT VFR BLD CALC: 36.2 % — LOW (ref 39–50)
HCT VFR BLD CALC: 36.9 % — LOW (ref 39–50)
HGB BLD-MCNC: 11.3 G/DL — LOW (ref 13–17)
HGB BLD-MCNC: 11.5 G/DL — LOW (ref 13–17)
INR BLD: 1.58 — HIGH (ref 0.88–1.16)
INR BLD: 1.75 — HIGH (ref 0.88–1.16)
LDH SERPL L TO P-CCNC: 220 U/L — SIGNIFICANT CHANGE UP (ref 50–242)
MAGNESIUM SERPL-MCNC: 2.1 MG/DL — SIGNIFICANT CHANGE UP (ref 1.6–2.6)
MCHC RBC-ENTMCNC: 31.2 G/DL — LOW (ref 32–36)
MCHC RBC-ENTMCNC: 31.2 G/DL — LOW (ref 32–36)
MCHC RBC-ENTMCNC: 32.4 PG — SIGNIFICANT CHANGE UP (ref 27–34)
MCHC RBC-ENTMCNC: 32.6 PG — SIGNIFICANT CHANGE UP (ref 27–34)
MCV RBC AUTO: 103.9 FL — HIGH (ref 80–100)
MCV RBC AUTO: 104.3 FL — HIGH (ref 80–100)
PLATELET # BLD AUTO: 168 K/UL — SIGNIFICANT CHANGE UP (ref 150–400)
PLATELET # BLD AUTO: 174 K/UL — SIGNIFICANT CHANGE UP (ref 150–400)
POTASSIUM SERPL-MCNC: 3.3 MMOL/L — LOW (ref 3.5–5.3)
POTASSIUM SERPL-MCNC: 4.1 MMOL/L — SIGNIFICANT CHANGE UP (ref 3.5–5.3)
POTASSIUM SERPL-SCNC: 3.3 MMOL/L — LOW (ref 3.5–5.3)
POTASSIUM SERPL-SCNC: 4.1 MMOL/L — SIGNIFICANT CHANGE UP (ref 3.5–5.3)
PROT SERPL-MCNC: 6.5 G/DL — SIGNIFICANT CHANGE UP (ref 6–8.3)
PROTHROM AB SERPL-ACNC: 17.7 SEC — HIGH (ref 9.8–12.7)
PROTHROM AB SERPL-ACNC: 19.7 SEC — HIGH (ref 9.8–12.7)
RBC # BLD: 3.47 M/UL — LOW (ref 4.2–5.8)
RBC # BLD: 3.55 M/UL — LOW (ref 4.2–5.8)
RBC # FLD: 15.3 % — SIGNIFICANT CHANGE UP (ref 10.3–16.9)
RBC # FLD: 15.3 % — SIGNIFICANT CHANGE UP (ref 10.3–16.9)
RH IG SCN BLD-IMP: POSITIVE — SIGNIFICANT CHANGE UP
SODIUM SERPL-SCNC: 137 MMOL/L — SIGNIFICANT CHANGE UP (ref 135–145)
SODIUM SERPL-SCNC: 141 MMOL/L — SIGNIFICANT CHANGE UP (ref 135–145)
TROPONIN T SERPL-MCNC: 0.05 NG/ML — CRITICAL HIGH (ref 0–0.01)
TROPONIN T SERPL-MCNC: 0.06 NG/ML — CRITICAL HIGH (ref 0–0.01)
WBC # BLD: 7 K/UL — SIGNIFICANT CHANGE UP (ref 3.8–10.5)
WBC # BLD: 9.3 K/UL — SIGNIFICANT CHANGE UP (ref 3.8–10.5)
WBC # FLD AUTO: 7 K/UL — SIGNIFICANT CHANGE UP (ref 3.8–10.5)
WBC # FLD AUTO: 9.3 K/UL — SIGNIFICANT CHANGE UP (ref 3.8–10.5)

## 2018-04-11 PROCEDURE — 71045 X-RAY EXAM CHEST 1 VIEW: CPT | Mod: 26

## 2018-04-11 PROCEDURE — 99232 SBSQ HOSP IP/OBS MODERATE 35: CPT

## 2018-04-11 PROCEDURE — 93459 L HRT ART/GRFT ANGIO: CPT | Mod: 26

## 2018-04-11 PROCEDURE — 92986 REVISION OF AORTIC VALVE: CPT

## 2018-04-11 RX ORDER — ACETAMINOPHEN 500 MG
650 TABLET ORAL ONCE
Qty: 0 | Refills: 0 | Status: COMPLETED | OUTPATIENT
Start: 2018-04-11 | End: 2018-04-11

## 2018-04-11 RX ORDER — CLOPIDOGREL BISULFATE 75 MG/1
75 TABLET, FILM COATED ORAL DAILY
Qty: 0 | Refills: 0 | Status: DISCONTINUED | OUTPATIENT
Start: 2018-04-12 | End: 2018-04-14

## 2018-04-11 RX ORDER — HEPARIN SODIUM 5000 [USP'U]/ML
5000 INJECTION INTRAVENOUS; SUBCUTANEOUS EVERY 8 HOURS
Qty: 0 | Refills: 0 | Status: DISCONTINUED | OUTPATIENT
Start: 2018-04-12 | End: 2018-04-13

## 2018-04-11 RX ORDER — ATORVASTATIN CALCIUM 80 MG/1
20 TABLET, FILM COATED ORAL AT BEDTIME
Qty: 0 | Refills: 0 | Status: DISCONTINUED | OUTPATIENT
Start: 2018-04-11 | End: 2018-04-12

## 2018-04-11 RX ORDER — MORPHINE SULFATE 50 MG/1
0.5 CAPSULE, EXTENDED RELEASE ORAL ONCE
Qty: 0 | Refills: 0 | Status: DISCONTINUED | OUTPATIENT
Start: 2018-04-11 | End: 2018-04-11

## 2018-04-11 RX ORDER — HEPARIN SODIUM 5000 [USP'U]/ML
850 INJECTION INTRAVENOUS; SUBCUTANEOUS
Qty: 25000 | Refills: 0 | Status: DISCONTINUED | OUTPATIENT
Start: 2018-04-11 | End: 2018-04-11

## 2018-04-11 RX ORDER — CEFAZOLIN SODIUM 1 G
2000 VIAL (EA) INJECTION EVERY 8 HOURS
Qty: 0 | Refills: 0 | Status: DISCONTINUED | OUTPATIENT
Start: 2018-04-11 | End: 2018-04-12

## 2018-04-11 RX ORDER — POTASSIUM CHLORIDE 20 MEQ
20 PACKET (EA) ORAL ONCE
Qty: 0 | Refills: 0 | Status: COMPLETED | OUTPATIENT
Start: 2018-04-11 | End: 2018-04-11

## 2018-04-11 RX ORDER — POTASSIUM CHLORIDE 20 MEQ
20 PACKET (EA) ORAL
Qty: 0 | Refills: 0 | Status: COMPLETED | OUTPATIENT
Start: 2018-04-11 | End: 2018-04-11

## 2018-04-11 RX ORDER — NITROGLYCERIN 6.5 MG
0.3 CAPSULE, EXTENDED RELEASE ORAL ONCE
Qty: 0 | Refills: 0 | Status: DISCONTINUED | OUTPATIENT
Start: 2018-04-11 | End: 2018-04-11

## 2018-04-11 RX ORDER — MAGNESIUM SULFATE 500 MG/ML
2 VIAL (ML) INJECTION ONCE
Qty: 0 | Refills: 0 | Status: COMPLETED | OUTPATIENT
Start: 2018-04-11 | End: 2018-04-11

## 2018-04-11 RX ORDER — SODIUM CHLORIDE 9 MG/ML
1000 INJECTION, SOLUTION INTRAVENOUS
Qty: 0 | Refills: 0 | Status: DISCONTINUED | OUTPATIENT
Start: 2018-04-11 | End: 2018-04-14

## 2018-04-11 RX ORDER — CLOPIDOGREL BISULFATE 75 MG/1
300 TABLET, FILM COATED ORAL ONCE
Qty: 0 | Refills: 0 | Status: COMPLETED | OUTPATIENT
Start: 2018-04-11 | End: 2018-04-11

## 2018-04-11 RX ORDER — DOCUSATE SODIUM 100 MG
100 CAPSULE ORAL DAILY
Qty: 0 | Refills: 0 | Status: DISCONTINUED | OUTPATIENT
Start: 2018-04-11 | End: 2018-04-14

## 2018-04-11 RX ADMIN — Medication 40 MILLIGRAM(S): at 06:19

## 2018-04-11 RX ADMIN — Medication 50 GRAM(S): at 17:53

## 2018-04-11 RX ADMIN — MORPHINE SULFATE 0.5 MILLIGRAM(S): 50 CAPSULE, EXTENDED RELEASE ORAL at 03:10

## 2018-04-11 RX ADMIN — Medication 650 MILLIGRAM(S): at 05:00

## 2018-04-11 RX ADMIN — Medication 100 MILLIEQUIVALENT(S): at 18:21

## 2018-04-11 RX ADMIN — CHLORHEXIDINE GLUCONATE 5 MILLILITER(S): 213 SOLUTION TOPICAL at 11:28

## 2018-04-11 RX ADMIN — Medication 325 MILLIGRAM(S): at 23:00

## 2018-04-11 RX ADMIN — Medication 81 MILLIGRAM(S): at 08:31

## 2018-04-11 RX ADMIN — TAMSULOSIN HYDROCHLORIDE 0.4 MILLIGRAM(S): 0.4 CAPSULE ORAL at 22:15

## 2018-04-11 RX ADMIN — SENNA PLUS 2 TABLET(S): 8.6 TABLET ORAL at 22:15

## 2018-04-11 RX ADMIN — Medication 650 MILLIGRAM(S): at 06:00

## 2018-04-11 RX ADMIN — PANTOPRAZOLE SODIUM 40 MILLIGRAM(S): 20 TABLET, DELAYED RELEASE ORAL at 06:19

## 2018-04-11 RX ADMIN — CHLORHEXIDINE GLUCONATE 1 APPLICATION(S): 213 SOLUTION TOPICAL at 06:19

## 2018-04-11 RX ADMIN — Medication 50 MILLIEQUIVALENT(S): at 19:52

## 2018-04-11 RX ADMIN — Medication 325 MILLIGRAM(S): at 22:18

## 2018-04-11 RX ADMIN — Medication 50 MILLIEQUIVALENT(S): at 21:10

## 2018-04-11 RX ADMIN — MORPHINE SULFATE 0.5 MILLIGRAM(S): 50 CAPSULE, EXTENDED RELEASE ORAL at 02:47

## 2018-04-11 RX ADMIN — SODIUM CHLORIDE 30 MILLILITER(S): 9 INJECTION, SOLUTION INTRAVENOUS at 19:52

## 2018-04-11 RX ADMIN — CLOPIDOGREL BISULFATE 300 MILLIGRAM(S): 75 TABLET, FILM COATED ORAL at 08:31

## 2018-04-11 RX ADMIN — ATORVASTATIN CALCIUM 20 MILLIGRAM(S): 80 TABLET, FILM COATED ORAL at 22:15

## 2018-04-11 NOTE — PROGRESS NOTE ADULT - PROBLEM SELECTOR PLAN 1
Patient with History of Severe Aortic Stenosis, echo w/ valve area of .6 cm2 and mean pressure gradient from 32-46 mmHg  - Structural Heart Consulted  - completed carotid dopplers, CT head, CT congenital chest, CT abd/pelvis   - Pending TAVR evaluation - PFTs  - for BAV today

## 2018-04-11 NOTE — PROGRESS NOTE ADULT - SUBJECTIVE AND OBJECTIVE BOX
HC    Patient is an 89 year old male with past medical history of Coronary Artery Disease (S/P CABG), Systolic Congestive Heart Failure, Atrial Fibrillation, Aortic Stenosis, Mitral Regurgitation, BPH presenting to [facility] for Elective Hernia Repair w/o preoperative labs or imaging, Labs, CXR, EKG, and Echocardiogram Obtained at [facility]. Patient with Elevated Troponin 0.03, Pleural Effusions on CXR, EKG Atrial Fibrillation with RBBB, and Echocardiogram with EF 30-35% and Severe Aortic Stenosis. Patient Sent to ED and found to be in acute decompensated heart failure in setting of severe aortic stenosis. Prior to admission patient has reported slow decline over the preceding year in regards to his functional capacity w/ decreasing exercise tolerance. He denies any syncopal episodes but reports some anginal symptoms with exertion. Echo cardiogram performed during admission significant for severe aortic stenosis w/ a valve area of .6 cm2 and mean pressure gradient between 32-46 mmHg. Patient was admitted for management of decompensated heart failure and severe symptomatic aortic stenosis. Patient initially diuresed w/ IV lasix 40 mg BID but become orthostatic with physical therapy. Seen and evaluated by structural heart for possible TAVR/BAV w/ pre op labs/imaging. Carotid u/s significant for 70% stenosis of right ICA. Congenital heart CT significant for occluded SVG to marginal branch and occluded SVG to RCA. CT angio abdomen and pelvis w/ b/l pleural effusions, cardiomegaly, aortic stenosis and mild ectasia of the ascending aorta. Given persistent pleural effusions despite diuresis patient underwent b/l thoracentesis w/ interventional radiology w/ 2L total of serous fluid removed. Patient tolerated the procedure without difficulty and reported improvement in his breathing. No new pneumothorax noted on cxray. Patient's heart rate has been at goal w/o intervention this hospitalization. Patient to undergo BAV.     INTERVAL HPI/OVERNIGHT EVENTS: Overnight patient w/ episode of chest press/pain following thoracentesis. EKG performed w/o new changes. Troponin increased to .06 from .04, now trending down .05.     SUBJECTIVE: Patient seen and examined at bedside. No new complaints this AM. Patient reports feeling well this morning. No new cardiopulmonary complaints. ROS negative.     OBJECTIVE:    VITAL SIGNS:  ICU Vital Signs Last 24 Hrs  T(C): 36.5 (11 Apr 2018 10:00), Max: 37.2 (10 Apr 2018 18:17)  T(F): 97.7 (11 Apr 2018 10:00), Max: 99 (10 Apr 2018 22:12)  HR: 82 (11 Apr 2018 11:34) (72 - 96)  BP: 136/70 (11 Apr 2018 11:34) (92/57 - 156/66)  BP(mean): 77 (11 Apr 2018 11:34) (70 - 108)  ABP: --  ABP(mean): --  RR: 20 (11 Apr 2018 11:34) (16 - 21)  SpO2: 97% (11 Apr 2018 11:34) (94% - 99%)        04-10 @ 07:01 - 04-11 @ 07:00  --------------------------------------------------------  IN: 266.5 mL / OUT: 1040 mL / NET: -773.5 mL    04-11 @ 07:01 - 04-11 @ 11:44  --------------------------------------------------------  IN: 92.5 mL / OUT: 650 mL / NET: -557.5 mL      CAPILLARY BLOOD GLUCOSE          PHYSICAL EXAM:    General: NAD, resting comfortably in bed.  HEENT: NC/AT; PERRL, clear conjunctiva, MMM  Neck: supple, no JVD  Respiratory: bibasilar crackles, on 2 L NC. Breathing comfortably  Cardiovascular: irregularly irregular, 3/6 crescendo/decrescendo murmur at RUSB w/ radiation to carotids   Abdomen: soft, NT/ND; +BS x4  Extremities: WWP, 2+ radial pulse b/l. brisk capillary refill. 1+pitting edema to knee b/l.   Skin: normal color and turgor; no rash  Neurological: a&ox3, no focal deficits.     MEDICATIONS:  MEDICATIONS  (STANDING):  aspirin enteric coated 81 milliGRAM(s) Oral daily  atorvastatin 20 milliGRAM(s) Oral at bedtime  furosemide   Injectable 40 milliGRAM(s) IV Push daily  heparin  Infusion 850 Unit(s)/Hr (8.5 mL/Hr) IV Continuous <Continuous>  pantoprazole    Tablet 40 milliGRAM(s) Oral before breakfast  polyethylene glycol 3350 17 Gram(s) Oral daily  senna 2 Tablet(s) Oral at bedtime  tamsulosin 0.4 milliGRAM(s) Oral at bedtime    MEDICATIONS  (PRN):  acetaminophen   Tablet. 325 milliGRAM(s) Oral every 4 hours PRN Moderate Pain (4 - 6)  aluminum hydroxide/magnesium hydroxide/simethicone Suspension 30 milliLiter(s) Oral every 4 hours PRN Dyspepsia  bisacodyl Suppository 10 milliGRAM(s) Rectal daily PRN Constipation      ALLERGIES:  Allergies    No Known Allergies    Intolerances        LABS:                        11.3   7.0   )-----------( 174      ( 11 Apr 2018 02:17 )             36.2     04-11    137  |  97  |  33<H>  ----------------------------<  118<H>  4.1   |  25  |  1.28    Ca    9.6      11 Apr 2018 02:17  Mg     2.1     04-11      PT/INR - ( 11 Apr 2018 02:17 )   PT: 17.7 sec;   INR: 1.58          PTT - ( 11 Apr 2018 10:42 )  PTT:71.3 sec      RADIOLOGY & ADDITIONAL TESTS:     US Duplex Carotid Arteries Complete, Bilateral (04.08.18 @ 15:49)   IMPRESSION:  1.  Findings consistent with greater than 70% stenosis of the right   internal carotid artery.  2.  No hemodynamically significant left carotid stenosis.  3.  Severe calcified atheromatous plaque.    CT Heart Congenital w/ IV Cont (04.09.18 @ 18:14)   Impression: 1. Study not optimized for coronary evaluation.  2. Patent LIMA to LAD  3. Occluded SVG to marginal branch.  4. Occluded SVG to RCA.  5. Severe native coronary artery disease.  6.  Difficult to evaluate stent in RCA due to severe motion artifact.    CT Angio Abdomen and Pelvis w/ IV Cont (04.09.18 @ 18:14)  IMPRESSION:   1.  Cardiomegaly with diffuse parenchymal heterogeneity of the aerated   upper lung zones indicative of interstitial edema.  2.  Large left and moderate right layering pleural effusions with near   complete collapse of the left lower lobe and partial collapse of the   dependent right lower lobe; and atelectasis of the lingula.   3.  Aortic stenosis and postoperative changes of median sternotomy for   CABG.  4.  Mild ectasia of the ascending aorta, without aortic dissection. No   pulmonary embolism.  5.  Small amount of perihepatic ascites.  6.  Nonspecific presacral edema and stranding  7.  Prostatomegaly.    TTE Echo w/Cont Complete (04.05.18 @ 09:32)   Irregular heart rate with various aortic gradient: the mean aortic valve area between 46 and 32mmHg. Normal left ventricular size and wall thickness. There is apical dyskinesis. The other walls are hypokinetic. There is mid inferolateral wall dyskinesis. The left ventricular ejection fraction is estimated to be 30-35%The left atrium is dilated. The mitral inflow pattern is consistent with restrictive left ventricular filling, suggestive of severely elevated left atrial pressure (>20mmHg).  Structurally normal mitral valve. There is mild mitral regurgitation. Structurally normal tricuspid valve. The pulmonary artery systolic pressure is estimated to be 80 mmHg. Structurally normal pulmonic valve. No aortic root dilatation. There is no pericardial effusion. Left pleural effusion noted.

## 2018-04-11 NOTE — PROGRESS NOTE ADULT - PROBLEM SELECTOR PLAN 9
Cardiac Diet    FULL CODE
Cardiac Diet    FULL CODE    Discussed with Cardiology Attending
Cardiac Diet    FULL CODE
Cardiac Diet    FULL CODE    Discussed with Cardiology Attending
Cardiac Diet    FULL CODE    Discussed with Cardiology Attending
Cardiac Diet    FULL CODE  Dispo: home, no needs.
Cardiac Diet  FULL CODE  Dispo: home, no needs.
Cardiac Diet    FULL CODE    Discussed with Cardiology Attending

## 2018-04-11 NOTE — PROGRESS NOTE ADULT - SUBJECTIVE AND OBJECTIVE BOX
INTERVAL HISTORY:  Awaiting TAVR    MEDICATIONS:  furosemide   Injectable 40 milliGRAM(s) IV Push daily  tamsulosin 0.4 milliGRAM(s) Oral at bedtime        acetaminophen   Tablet. 325 milliGRAM(s) Oral every 4 hours PRN    aluminum hydroxide/magnesium hydroxide/simethicone Suspension 30 milliLiter(s) Oral every 4 hours PRN  bisacodyl Suppository 10 milliGRAM(s) Rectal daily PRN  pantoprazole    Tablet 40 milliGRAM(s) Oral before breakfast  polyethylene glycol 3350 17 Gram(s) Oral daily  senna 2 Tablet(s) Oral at bedtime    atorvastatin 20 milliGRAM(s) Oral at bedtime    aspirin enteric coated 81 milliGRAM(s) Oral daily  chlorhexidine 0.12% Liquid 5 milliLiter(s) Swish and Spit once  heparin  Infusion 850 Unit(s)/Hr IV Continuous <Continuous>        PHYSICAL EXAM:  T(C): 36.9 (04-11-18 @ 05:18), Max: 37.2 (04-10-18 @ 18:17)  HR: 78 (04-11-18 @ 08:21) (72 - 96)  BP: 106/63 (04-11-18 @ 08:21) (92/57 - 156/66)  RR: 20 (04-11-18 @ 08:21) (16 - 21)  SpO2: 98% (04-11-18 @ 08:21) (94% - 99%)  Wt(kg): --  I&O's Summary    10 Apr 2018 07:01 - 11 Apr 2018 07:00  --------------------------------------------------------  IN: 266.5 mL / OUT: 1040 mL / NET: -773.5 mL    11 Apr 2018 07:01  -  11 Apr 2018 08:43  --------------------------------------------------------  IN: 67 mL / OUT: 0 mL / NET: 67 mL          Appearance: Normal	  HEENT:   Normal oral mucosa, PERRL, EOMI	  Lymphatic: No lymphadenopathy  Cardiovascular: Irregular, variable S1, soft S2, No JVD, AS murmur, + edema  Respiratory: Lungs clear to auscultation	  Psychiatry: A & O x 3, Mood & affect appropriate  Gastrointestinal:  Soft, Non-tender, + BS	  Skin: No rashes, No ecchymoses, No cyanosis  Neurologic: Non-focal  Extremities: Normal range of motion, No clubbing, cyanosis, + edema  Vascular: Peripheral pulses palpable 2+ bilaterally    TELEMETRY: 	    ECG:  	  RADIOLOGY:   DIAGNOSTIC TESTING:  [ ] Echocardiogram:  [ ]  Catheterization:  [ ] Stress Test:    OTHER: 	    LABS:	 	    CARDIAC MARKERS:                                  11.3   7.0   )-----------( 174      ( 11 Apr 2018 02:17 )             36.2     04-11    137  |  97  |  33<H>  ----------------------------<  118<H>  4.1   |  25  |  1.28    Ca    9.6      11 Apr 2018 02:17  Mg     2.1     04-11      proBNP:   Lipid Profile:   HgA1c:   TSH:     ASSESSMENT/PLAN:

## 2018-04-11 NOTE — PROGRESS NOTE ADULT - PROBLEM SELECTOR PLAN 8
DVT Prophylaxis: SCD, Eliquis

## 2018-04-11 NOTE — PROGRESS NOTE ADULT - PROBLEM SELECTOR PLAN 2
Patient with Acute on Chronic Decompensated CHF. Echocardiogram with EF 30-35%.   - decreased lasix to 40 mg IVP QD given orthostatic BP   - Holding Beta Blocker, ACE-I/ARB in Setting of Severe AS.   - Strict I/Os, Daily Standing Weights.   - s/p therapeutic thoracentsis w/ drainage of 1L b/l  -pending CATH/BAV/TAVR

## 2018-04-11 NOTE — PROGRESS NOTE ADULT - PROBLEM SELECTOR PLAN 1
Patient with History of Severe Aortic Stenosis. Noted on Echocardiogram Performed at [facility] Before Admission.   - Structural Heart Consulted, For Possible TAVR.  Seen by Dr Cyr, went to IR for thoracentesis prior to TAVR  - Pending TAVR evaluation   - Will Order for PFTs Once Breathing Improves Further.

## 2018-04-12 LAB
ALBUMIN SERPL ELPH-MCNC: 2.8 G/DL — LOW (ref 3.3–5)
ALBUMIN SERPL ELPH-MCNC: 3.3 G/DL — SIGNIFICANT CHANGE UP (ref 3.3–5)
ALP SERPL-CCNC: 70 U/L — SIGNIFICANT CHANGE UP (ref 40–120)
ALP SERPL-CCNC: 79 U/L — SIGNIFICANT CHANGE UP (ref 40–120)
ALT FLD-CCNC: 13 U/L — SIGNIFICANT CHANGE UP (ref 10–45)
ALT FLD-CCNC: 17 U/L — SIGNIFICANT CHANGE UP (ref 10–45)
ANION GAP SERPL CALC-SCNC: 13 MMOL/L — SIGNIFICANT CHANGE UP (ref 5–17)
ANION GAP SERPL CALC-SCNC: 4 MMOL/L — LOW (ref 5–17)
APTT BLD: 34.1 SEC — SIGNIFICANT CHANGE UP (ref 27.5–37.4)
APTT BLD: 34.7 SEC — SIGNIFICANT CHANGE UP (ref 27.5–37.4)
AST SERPL-CCNC: 19 U/L — SIGNIFICANT CHANGE UP (ref 10–40)
AST SERPL-CCNC: 22 U/L — SIGNIFICANT CHANGE UP (ref 10–40)
BILIRUB SERPL-MCNC: 0.8 MG/DL — SIGNIFICANT CHANGE UP (ref 0.2–1.2)
BILIRUB SERPL-MCNC: 0.8 MG/DL — SIGNIFICANT CHANGE UP (ref 0.2–1.2)
BUN SERPL-MCNC: 30 MG/DL — HIGH (ref 7–23)
BUN SERPL-MCNC: 30 MG/DL — HIGH (ref 7–23)
CALCIUM SERPL-MCNC: 8.8 MG/DL — SIGNIFICANT CHANGE UP (ref 8.4–10.5)
CALCIUM SERPL-MCNC: 9.1 MG/DL — SIGNIFICANT CHANGE UP (ref 8.4–10.5)
CHLORIDE SERPL-SCNC: 108 MMOL/L — SIGNIFICANT CHANGE UP (ref 96–108)
CHLORIDE SERPL-SCNC: 98 MMOL/L — SIGNIFICANT CHANGE UP (ref 96–108)
CO2 SERPL-SCNC: 27 MMOL/L — SIGNIFICANT CHANGE UP (ref 22–31)
CO2 SERPL-SCNC: 28 MMOL/L — SIGNIFICANT CHANGE UP (ref 22–31)
CREAT SERPL-MCNC: 1.02 MG/DL — SIGNIFICANT CHANGE UP (ref 0.5–1.3)
CREAT SERPL-MCNC: 1.08 MG/DL — SIGNIFICANT CHANGE UP (ref 0.5–1.3)
CULTURE RESULTS: NO GROWTH — SIGNIFICANT CHANGE UP
GAS PNL BLDA: SIGNIFICANT CHANGE UP
GLUCOSE SERPL-MCNC: 100 MG/DL — HIGH (ref 70–99)
GLUCOSE SERPL-MCNC: 94 MG/DL — SIGNIFICANT CHANGE UP (ref 70–99)
HCT VFR BLD CALC: 30.8 % — LOW (ref 39–50)
HCT VFR BLD CALC: 34.8 % — LOW (ref 39–50)
HGB BLD-MCNC: 10.9 G/DL — LOW (ref 13–17)
HGB BLD-MCNC: 9.6 G/DL — LOW (ref 13–17)
INR BLD: 1.54 — HIGH (ref 0.88–1.16)
INR BLD: 1.57 — HIGH (ref 0.88–1.16)
LACTATE SERPL-SCNC: 1.1 MMOL/L — SIGNIFICANT CHANGE UP (ref 0.5–2)
LACTATE SERPL-SCNC: 1.5 MMOL/L — SIGNIFICANT CHANGE UP (ref 0.5–2)
MAGNESIUM SERPL-MCNC: 2.3 MG/DL — SIGNIFICANT CHANGE UP (ref 1.6–2.6)
MAGNESIUM SERPL-MCNC: 2.3 MG/DL — SIGNIFICANT CHANGE UP (ref 1.6–2.6)
MCHC RBC-ENTMCNC: 31.2 G/DL — LOW (ref 32–36)
MCHC RBC-ENTMCNC: 31.3 G/DL — LOW (ref 32–36)
MCHC RBC-ENTMCNC: 32.3 PG — SIGNIFICANT CHANGE UP (ref 27–34)
MCHC RBC-ENTMCNC: 32.5 PG — SIGNIFICANT CHANGE UP (ref 27–34)
MCV RBC AUTO: 103.3 FL — HIGH (ref 80–100)
MCV RBC AUTO: 104.4 FL — HIGH (ref 80–100)
PHOSPHATE SERPL-MCNC: 4 MG/DL — SIGNIFICANT CHANGE UP (ref 2.5–4.5)
PHOSPHATE SERPL-MCNC: 4.8 MG/DL — HIGH (ref 2.5–4.5)
PLATELET # BLD AUTO: 136 K/UL — LOW (ref 150–400)
PLATELET # BLD AUTO: 165 K/UL — SIGNIFICANT CHANGE UP (ref 150–400)
POTASSIUM SERPL-MCNC: 4.1 MMOL/L — SIGNIFICANT CHANGE UP (ref 3.5–5.3)
POTASSIUM SERPL-MCNC: 4.7 MMOL/L — SIGNIFICANT CHANGE UP (ref 3.5–5.3)
POTASSIUM SERPL-SCNC: 4.1 MMOL/L — SIGNIFICANT CHANGE UP (ref 3.5–5.3)
POTASSIUM SERPL-SCNC: 4.7 MMOL/L — SIGNIFICANT CHANGE UP (ref 3.5–5.3)
PROT SERPL-MCNC: 6 G/DL — SIGNIFICANT CHANGE UP (ref 6–8.3)
PROT SERPL-MCNC: 6.2 G/DL — SIGNIFICANT CHANGE UP (ref 6–8.3)
PROTHROM AB SERPL-ACNC: 17.2 SEC — HIGH (ref 9.8–12.7)
PROTHROM AB SERPL-ACNC: 17.6 SEC — HIGH (ref 9.8–12.7)
RBC # BLD: 2.95 M/UL — LOW (ref 4.2–5.8)
RBC # BLD: 3.37 M/UL — LOW (ref 4.2–5.8)
RBC # FLD: 15 % — SIGNIFICANT CHANGE UP (ref 10.3–16.9)
RBC # FLD: 15.1 % — SIGNIFICANT CHANGE UP (ref 10.3–16.9)
SODIUM SERPL-SCNC: 139 MMOL/L — SIGNIFICANT CHANGE UP (ref 135–145)
SODIUM SERPL-SCNC: 139 MMOL/L — SIGNIFICANT CHANGE UP (ref 135–145)
SPECIMEN SOURCE: SIGNIFICANT CHANGE UP
WBC # BLD: 6.3 K/UL — SIGNIFICANT CHANGE UP (ref 3.8–10.5)
WBC # BLD: 7.1 K/UL — SIGNIFICANT CHANGE UP (ref 3.8–10.5)
WBC # FLD AUTO: 6.3 K/UL — SIGNIFICANT CHANGE UP (ref 3.8–10.5)
WBC # FLD AUTO: 7.1 K/UL — SIGNIFICANT CHANGE UP (ref 3.8–10.5)

## 2018-04-12 PROCEDURE — 99232 SBSQ HOSP IP/OBS MODERATE 35: CPT

## 2018-04-12 PROCEDURE — 93306 TTE W/DOPPLER COMPLETE: CPT | Mod: 26

## 2018-04-12 PROCEDURE — 93010 ELECTROCARDIOGRAM REPORT: CPT

## 2018-04-12 PROCEDURE — 71045 X-RAY EXAM CHEST 1 VIEW: CPT | Mod: 26

## 2018-04-12 PROCEDURE — 99291 CRITICAL CARE FIRST HOUR: CPT

## 2018-04-12 RX ORDER — FUROSEMIDE 40 MG
20 TABLET ORAL ONCE
Qty: 0 | Refills: 0 | Status: COMPLETED | OUTPATIENT
Start: 2018-04-12 | End: 2018-04-12

## 2018-04-12 RX ORDER — SODIUM CHLORIDE 9 MG/ML
3 INJECTION INTRAMUSCULAR; INTRAVENOUS; SUBCUTANEOUS EVERY 8 HOURS
Qty: 0 | Refills: 0 | Status: DISCONTINUED | OUTPATIENT
Start: 2018-04-12 | End: 2018-04-14

## 2018-04-12 RX ORDER — ALBUMIN HUMAN 25 %
250 VIAL (ML) INTRAVENOUS ONCE
Qty: 0 | Refills: 0 | Status: COMPLETED | OUTPATIENT
Start: 2018-04-12 | End: 2018-04-12

## 2018-04-12 RX ORDER — ASPIRIN/CALCIUM CARB/MAGNESIUM 324 MG
81 TABLET ORAL ONCE
Qty: 0 | Refills: 0 | Status: COMPLETED | OUTPATIENT
Start: 2018-04-12 | End: 2018-04-12

## 2018-04-12 RX ORDER — MAGNESIUM OXIDE 400 MG ORAL TABLET 241.3 MG
400 TABLET ORAL
Qty: 0 | Refills: 0 | Status: DISCONTINUED | OUTPATIENT
Start: 2018-04-12 | End: 2018-04-12

## 2018-04-12 RX ADMIN — Medication 325 MILLIGRAM(S): at 06:33

## 2018-04-12 RX ADMIN — PANTOPRAZOLE SODIUM 40 MILLIGRAM(S): 20 TABLET, DELAYED RELEASE ORAL at 06:32

## 2018-04-12 RX ADMIN — Medication 100 MILLIGRAM(S): at 11:20

## 2018-04-12 RX ADMIN — CLOPIDOGREL BISULFATE 75 MILLIGRAM(S): 75 TABLET, FILM COATED ORAL at 11:20

## 2018-04-12 RX ADMIN — Medication 125 MILLILITER(S): at 02:34

## 2018-04-12 RX ADMIN — ATORVASTATIN CALCIUM 20 MILLIGRAM(S): 80 TABLET, FILM COATED ORAL at 22:15

## 2018-04-12 RX ADMIN — HEPARIN SODIUM 5000 UNIT(S): 5000 INJECTION INTRAVENOUS; SUBCUTANEOUS at 06:32

## 2018-04-12 RX ADMIN — Medication 100 MILLIGRAM(S): at 06:32

## 2018-04-12 RX ADMIN — HEPARIN SODIUM 5000 UNIT(S): 5000 INJECTION INTRAVENOUS; SUBCUTANEOUS at 13:51

## 2018-04-12 RX ADMIN — HEPARIN SODIUM 5000 UNIT(S): 5000 INJECTION INTRAVENOUS; SUBCUTANEOUS at 22:15

## 2018-04-12 RX ADMIN — Medication 325 MILLIGRAM(S): at 07:13

## 2018-04-12 RX ADMIN — SODIUM CHLORIDE 3 MILLILITER(S): 9 INJECTION INTRAMUSCULAR; INTRAVENOUS; SUBCUTANEOUS at 21:35

## 2018-04-12 RX ADMIN — Medication 81 MILLIGRAM(S): at 18:22

## 2018-04-12 RX ADMIN — TAMSULOSIN HYDROCHLORIDE 0.4 MILLIGRAM(S): 0.4 CAPSULE ORAL at 22:15

## 2018-04-12 RX ADMIN — SENNA PLUS 2 TABLET(S): 8.6 TABLET ORAL at 22:15

## 2018-04-12 RX ADMIN — SODIUM CHLORIDE 3 MILLILITER(S): 9 INJECTION INTRAMUSCULAR; INTRAVENOUS; SUBCUTANEOUS at 13:16

## 2018-04-12 RX ADMIN — Medication 20 MILLIGRAM(S): at 06:32

## 2018-04-12 RX ADMIN — Medication 125 MILLILITER(S): at 02:33

## 2018-04-12 NOTE — PROGRESS NOTE ADULT - PROBLEM SELECTOR PLAN 1
Patient with History of Severe Aortic Stenosis. Noted on Echocardiogram Performed at [facility] Before Admission.   - Structural Heart Consulted, s/p PTAV

## 2018-04-12 NOTE — PROGRESS NOTE ADULT - SUBJECTIVE AND OBJECTIVE BOX
SUBJECTIVE ASSESSMENT:   S/p successful BAV with a 22x35 mm TruFlow balloon and PCI of ostial RCA with QI (a 4.0x12 mm Promus premier, postdilated with a 4.5 NC)  .  Denies chest pain, SOB, leg swelling. No groin hematoma.  Tele: afib rate 50's    Review of Systems  CONSTITUTIONAL:  No fevers / chills.                                    NEURO: No numbness and weakness                                                                                CV:  No chest pain, palpitations, SOB, MCCARTHY, orthopnea                                                                                        RESPIRATORY:  No wheezing, SOB, cough                                                             GI:  No nausea, vomiting.                                                                          : No hematuria.                                                                                       MUSCULOSKELETAL:  No joint pain.                                                            Vital Signs Last 24 Hrs  T(C): 36.8 (12 Apr 2018 14:13), Max: 36.8 (12 Apr 2018 14:13)  T(F): 98.3 (12 Apr 2018 14:13), Max: 98.3 (12 Apr 2018 14:13)  HR: 74 (12 Apr 2018 14:00) (66 - 84)  BP: 119/41 (12 Apr 2018 14:00) (100/45 - 135/55)  BP(mean): 83 (12 Apr 2018 14:00) (71 - 84)  RR: 23 (12 Apr 2018 14:00) (14 - 28)  SpO2: 100% (12 Apr 2018 14:00) (95% - 100%)  I&O's Detail    11 Apr 2018 07:01  -  12 Apr 2018 07:00  --------------------------------------------------------  IN:    heparin Infusion: 42.5 mL    Oral Fluid: 270 mL    sodium chloride 0.45%.: 440 mL    Solution: 850 mL  Total IN: 1602.5 mL    OUT:    Voided: 1150 mL  Total OUT: 1150 mL    Total NET: 452.5 mL      12 Apr 2018 07:01  -  12 Apr 2018 16:03  --------------------------------------------------------  IN:    Oral Fluid: 450 mL    sodium chloride 0.45%.: 30 mL  Total IN: 480 mL    OUT:    Voided: 800 mL  Total OUT: 800 mL    Total NET: -320 mL            PHYSICAL EXAM:    General: No acute distress.     HEENT: No JVD. No carotid bruits. No thyromegaly.    Cardiovascular: Irregularly irregular rhythm, normal S1, decreased S2. SUSAN grade II/VI at LSB, RUSB. No gallops. No rubs.     Respiratory: CTA bilaterally. No rhonchi. No wheezing. No crackles.    Gastrointestinal: Soft, not tender. Not distended. Normal bowel sounds. No hepatosplenomegaly.     Extremities: No leg edema. Bilateral femoral pulses.  No groin hematoma.     Vascular: Normal peripheral pulses.     Neurological: A&Ox3. No focal deficit.     Skin: warm, intact.       LABS:                        9.6    6.3   )-----------( 136      ( 12 Apr 2018 03:25 )             30.8       COUMADIN:  Yes/No. REASON: .    PT/INR - ( 12 Apr 2018 03:25 )   PT: 17.6 sec;   INR: 1.57          PTT - ( 12 Apr 2018 03:25 )  PTT:34.1 sec    04-12    139  |  108  |  30<H>  ----------------------------<  94  4.1   |  27  |  1.02    Ca    8.8      12 Apr 2018 03:29  Phos  4.0     04-12  Mg     2.3     04-12    TPro  6.2  /  Alb  3.3  /  TBili  0.8  /  DBili  x   /  AST  19  /  ALT  13  /  AlkPhos  70  04-12          MEDICATIONS  (STANDING):  atorvastatin 20 milliGRAM(s) Oral at bedtime  clopidogrel Tablet 75 milliGRAM(s) Oral daily  docusate sodium 100 milliGRAM(s) Oral daily  heparin  Injectable 5000 Unit(s) SubCutaneous every 8 hours  pantoprazole    Tablet 40 milliGRAM(s) Oral before breakfast  senna 2 Tablet(s) Oral at bedtime  sodium chloride 0.45%. 1000 milliLiter(s) (30 mL/Hr) IV Continuous <Continuous>  sodium chloride 0.9% lock flush 3 milliLiter(s) IV Push every 8 hours  tamsulosin 0.4 milliGRAM(s) Oral at bedtime    MEDICATIONS  (PRN):  acetaminophen   Tablet. 325 milliGRAM(s) Oral every 4 hours PRN Moderate Pain (4 - 6)        RADIOLOGY & ADDITIONAL TESTS:  CXR: cardiomegaly, pulmonary venous congestion.

## 2018-04-12 NOTE — PROGRESS NOTE ADULT - SUBJECTIVE AND OBJECTIVE BOX
INTERVAL HISTORY:  s/p PTAV(not TAVR) and oRCA stent  	  MEDICATIONS:  tamsulosin 0.4 milliGRAM(s) Oral at bedtime    ceFAZolin   IVPB 2000 milliGRAM(s) IV Intermittent every 8 hours      acetaminophen   Tablet. 325 milliGRAM(s) Oral every 4 hours PRN    docusate sodium 100 milliGRAM(s) Oral daily  pantoprazole    Tablet 40 milliGRAM(s) Oral before breakfast  senna 2 Tablet(s) Oral at bedtime    atorvastatin 20 milliGRAM(s) Oral at bedtime  atorvastatin 20 milliGRAM(s) Oral at bedtime    clopidogrel Tablet 75 milliGRAM(s) Oral daily  heparin  Injectable 5000 Unit(s) SubCutaneous every 8 hours  magnesium oxide 400 milliGRAM(s) Oral three times a day with meals  sodium chloride 0.45%. 1000 milliLiter(s) IV Continuous <Continuous>  sodium chloride 0.9% lock flush 3 milliLiter(s) IV Push every 8 hours        PHYSICAL EXAM:  T(C): 36.4 (04-12-18 @ 05:00), Max: 36.5 (04-11-18 @ 10:00)  HR: 74 (04-12-18 @ 07:00) (66 - 82)  BP: 109/63 (04-12-18 @ 00:00) (106/63 - 136/70)  RR: 18 (04-12-18 @ 07:00) (15 - 25)  SpO2: 100% (04-12-18 @ 07:00) (96% - 100%)  Wt(kg): --  I&O's Summary    11 Apr 2018 07:01  -  12 Apr 2018 07:00  --------------------------------------------------------  IN: 1602.5 mL / OUT: 1150 mL / NET: 452.5 mL    12 Apr 2018 07:01  -  12 Apr 2018 07:37  --------------------------------------------------------  IN: 30 mL / OUT: 0 mL / NET: 30 mL          Appearance: Normal	  HEENT:   Normal oral mucosa, PERRL, EOMI	  Lymphatic: No lymphadenopathy  Cardiovascular: Irregular, variable S1 S2, No JVD, AS murmur, + edema  Respiratory: Lungs clear to auscultation	  Psychiatry: A & O x 3, Mood & affect appropriate  Gastrointestinal:  Soft, Non-tender, + BS	  Skin: No rashes, No ecchymoses, No cyanosis  Neurologic: Non-focal  Extremities: Normal range of motion, No clubbing, cyanosis, +r edema  Vascular: Peripheral pulses palpable 2+ bilaterally    TELEMETRY: 	    ECG:  	  RADIOLOGY:   DIAGNOSTIC TESTING:  [ ] Echocardiogram:  [ ]  Catheterization:  [ ] Stress Test:    OTHER: 	    LABS:	 	    CARDIAC MARKERS:                                  9.6    6.3   )-----------( 136      ( 12 Apr 2018 03:25 )             30.8     04-12    139  |  108  |  30<H>  ----------------------------<  94  4.1   |  27  |  1.02    Ca    8.8      12 Apr 2018 03:29  Phos  4.0     04-12  Mg     2.3     04-12    TPro  6.2  /  Alb  3.3  /  TBili  0.8  /  DBili  x   /  AST  19  /  ALT  13  /  AlkPhos  70  04-12    proBNP:   Lipid Profile:   HgA1c:   TSH:     ASSESSMENT/PLAN:

## 2018-04-12 NOTE — PROGRESS NOTE ADULT - ASSESSMENT
88yo with multiple co-morbidities s/p BAV, PCI doing well    Problems  1. AS s/p BAV  2. CAD prior CABG now PCI to RCA/OM  3. h/o AFib  4. labile BP overnight    Plan   -- Hemodynamic support  -- plan for Plavix and eliquis for Afib/CAD  -- Statin  -- PT eval ambulation   -- Echo for follow up today   -- Transfer to floor is remains stable this AM  -- Plan is for TAVR in future.     Critical care time spent 30min

## 2018-04-12 NOTE — PROGRESS NOTE ADULT - SUBJECTIVE AND OBJECTIVE BOX
POD #1 s/p BAV, PCI to RCA/OM    89yo with history of CAD previous CABG, AFib previously on Eliquis, AS, MR, systolic CHF EF 30-35% admitted to on 4/5 with decompensated CHF.  On workup diagnosed with severe AS/CAD now s/p BAV and PCI.     PMH :  PLEURAL EFFUSION  Enlarged prostate  High cholesterol  GERD (gastroesophageal reflux disease)  MI (myocardial infarction)  CHF (congestive heart failure)  Afib  Pleural effusion  Nutrition, metabolism, and development symptoms  Preventive measure  BPH (benign prostatic hyperplasia)  GERD (gastroesophageal reflux disease)  Hyperlipidemia  Coronary artery disease  Atrial fibrillation  Congestive heart failure  Hernia  High cholesterol  ASHD (arteriosclerotic heart disease)  Acute on chronic combined systolic and diastolic congestive heart failure  Chronic atrial fibrillation    ROS polish speaking   All other systems reviewed and negative.    ICU Vital Signs Last 24 Hrs  T(C): 36.4 (04-12-18 @ 05:00), Max: 36.5 (04-11-18 @ 10:00)  T(F): 97.6 (04-12-18 @ 05:00), Max: 97.7 (04-11-18 @ 10:00)  HR: 74 (04-12-18 @ 07:00) (66 - 82)  BP: 109/63 (04-12-18 @ 00:00) (106/63 - 136/70)  BP(mean): 84 (04-12-18 @ 00:00) (77 - 84)  ABP: 136/54 (04-12-18 @ 07:00) (104/44 - 180/96)  ABP(mean): 82 (04-12-18 @ 07:00) (60 - 128)  RR: 18 (04-12-18 @ 07:00) (15 - 25)  SpO2: 100% (04-12-18 @ 07:00) (96% - 100%)    I&O's Summary    11 Apr 2018 07:01  -  12 Apr 2018 07:00  --------------------------------------------------------  IN: 1602.5 mL / OUT: 1150 mL / NET: 452.5 mL    12 Apr 2018 07:01  -  12 Apr 2018 07:52  --------------------------------------------------------  IN: 30 mL / OUT: 0 mL / NET: 30 mL        ABG - ( 12 Apr 2018 03:10 )  pH: 7.48  /  pCO2: 38    /  pO2: 101   / HCO3: 28    / Base Excess: 4.4   /  SaO2: 98                              9.6    6.3   )-----------( 136      ( 12 Apr 2018 03:25 )             30.8     12 Apr 2018 03:29    139    |  108    |  30     ----------------------------<  94     4.1     |  27     |  1.02     Ca    8.8        12 Apr 2018 03:29  Phos  4.0       12 Apr 2018 03:29  Mg     2.3       12 Apr 2018 03:29    TPro  6.2    /  Alb  3.3    /  TBili  0.8    /  DBili  x      /  AST  19     /  ALT  13     /  AlkPhos  70     12 Apr 2018 03:29    PT/INR - ( 12 Apr 2018 03:25 )   PT: 17.6 sec;   INR: 1.57     PTT - ( 12 Apr 2018 03:25 )  PTT:34.1 sec    MEDICATIONS  (STANDING):  atorvastatin 20 milliGRAM(s) Oral at bedtime  ceFAZolin   IVPB 2000 milliGRAM(s) IV Intermittent every 8 hours  clopidogrel Tablet 75 milliGRAM(s) Oral daily  docusate sodium 100 milliGRAM(s) Oral daily  pantoprazole    Tablet 40 milliGRAM(s) Oral before breakfast  senna 2 Tablet(s) Oral at bedtime  tamsulosin 0.4 milliGRAM(s) Oral at bedtime    Home Medications:  aspirin 81 mg oral tablet: 1 tab(s) orally once a day (05 Apr 2018 17:49)  Eliquis 2.5 mg oral tablet: 1  orally 2 times a day (05 Apr 2018 17:49)  Flomax 0.4 mg oral capsule: 1 cap(s) orally once a day (05 Apr 2018 17:49)  Lasix 40 mg oral tablet: 1 tab(s) orally once a day (05 Apr 2018 17:49)  Lipitor 20 mg oral tablet: 1 tab(s) orally once a day (05 Apr 2018 17:49)  losartan 50 mg oral tablet: 1 tab(s) orally once a day (05 Apr 2018 17:49)  metoprolol succinate 25 mg oral tablet, extended release: 1 tab(s) orally once a day (05 Apr 2018 17:49)  Protonix 40 mg oral delayed release tablet: 1 tab(s) orally once a day (05 Apr 2018 17:49)    PHYSICAL EXAM:  Gen : no acute distress  Respiratory: decreased in the bases  Cardiovascular: S1 and S2, RRR, no M/G/R  Gastrointestinal: BS+, soft, NT/ND  Extremities: No peripheral edema  Vascular: 2+ peripheral pulses  no groin hematoma   Neurological: A/O x 3, no focal deficits  Lines: no sign of infection

## 2018-04-13 ENCOUNTER — TRANSCRIPTION ENCOUNTER (OUTPATIENT)
Age: 83
End: 2018-04-13

## 2018-04-13 LAB
ANION GAP SERPL CALC-SCNC: 8 MMOL/L — SIGNIFICANT CHANGE UP (ref 5–17)
APTT BLD: 35.7 SEC — SIGNIFICANT CHANGE UP (ref 27.5–37.4)
BASOPHILS NFR BLD AUTO: 0.2 % — SIGNIFICANT CHANGE UP (ref 0–2)
BUN SERPL-MCNC: 31 MG/DL — HIGH (ref 7–23)
CALCIUM SERPL-MCNC: 9.4 MG/DL — SIGNIFICANT CHANGE UP (ref 8.4–10.5)
CHLORIDE SERPL-SCNC: 99 MMOL/L — SIGNIFICANT CHANGE UP (ref 96–108)
CO2 SERPL-SCNC: 31 MMOL/L — SIGNIFICANT CHANGE UP (ref 22–31)
CREAT SERPL-MCNC: 1.05 MG/DL — SIGNIFICANT CHANGE UP (ref 0.5–1.3)
EOSINOPHIL NFR BLD AUTO: 1.6 % — SIGNIFICANT CHANGE UP (ref 0–6)
GLUCOSE SERPL-MCNC: 132 MG/DL — HIGH (ref 70–99)
HCT VFR BLD CALC: 34 % — LOW (ref 39–50)
HGB BLD-MCNC: 10.7 G/DL — LOW (ref 13–17)
INR BLD: 1.23 — HIGH (ref 0.88–1.16)
LYMPHOCYTES # BLD AUTO: 17.3 % — SIGNIFICANT CHANGE UP (ref 13–44)
MAGNESIUM SERPL-MCNC: 2.3 MG/DL — SIGNIFICANT CHANGE UP (ref 1.6–2.6)
MCHC RBC-ENTMCNC: 31.5 G/DL — LOW (ref 32–36)
MCHC RBC-ENTMCNC: 32.1 PG — SIGNIFICANT CHANGE UP (ref 27–34)
MCV RBC AUTO: 102.1 FL — HIGH (ref 80–100)
MONOCYTES NFR BLD AUTO: 10.3 % — SIGNIFICANT CHANGE UP (ref 2–14)
NEUTROPHILS NFR BLD AUTO: 70.6 % — SIGNIFICANT CHANGE UP (ref 43–77)
PLATELET # BLD AUTO: 151 K/UL — SIGNIFICANT CHANGE UP (ref 150–400)
POTASSIUM SERPL-MCNC: 3.6 MMOL/L — SIGNIFICANT CHANGE UP (ref 3.5–5.3)
POTASSIUM SERPL-SCNC: 3.6 MMOL/L — SIGNIFICANT CHANGE UP (ref 3.5–5.3)
PROTHROM AB SERPL-ACNC: 13.7 SEC — HIGH (ref 9.8–12.7)
RBC # BLD: 3.33 M/UL — LOW (ref 4.2–5.8)
RBC # FLD: 15.7 % — SIGNIFICANT CHANGE UP (ref 10.3–16.9)
SODIUM SERPL-SCNC: 138 MMOL/L — SIGNIFICANT CHANGE UP (ref 135–145)
WBC # BLD: 6.4 K/UL — SIGNIFICANT CHANGE UP (ref 3.8–10.5)
WBC # FLD AUTO: 6.4 K/UL — SIGNIFICANT CHANGE UP (ref 3.8–10.5)

## 2018-04-13 PROCEDURE — 99233 SBSQ HOSP IP/OBS HIGH 50: CPT | Mod: 24

## 2018-04-13 PROCEDURE — 71045 X-RAY EXAM CHEST 1 VIEW: CPT | Mod: 26

## 2018-04-13 PROCEDURE — 99232 SBSQ HOSP IP/OBS MODERATE 35: CPT

## 2018-04-13 RX ORDER — METOPROLOL TARTRATE 50 MG
12.5 TABLET ORAL EVERY 8 HOURS
Qty: 0 | Refills: 0 | Status: DISCONTINUED | OUTPATIENT
Start: 2018-04-13 | End: 2018-04-13

## 2018-04-13 RX ORDER — FUROSEMIDE 40 MG
20 TABLET ORAL
Qty: 0 | Refills: 0 | Status: DISCONTINUED | OUTPATIENT
Start: 2018-04-13 | End: 2018-04-14

## 2018-04-13 RX ORDER — POTASSIUM CHLORIDE 20 MEQ
40 PACKET (EA) ORAL DAILY
Qty: 0 | Refills: 0 | Status: COMPLETED | OUTPATIENT
Start: 2018-04-13 | End: 2018-04-13

## 2018-04-13 RX ORDER — FUROSEMIDE 40 MG
20 TABLET ORAL DAILY
Qty: 0 | Refills: 0 | Status: DISCONTINUED | OUTPATIENT
Start: 2018-04-13 | End: 2018-04-13

## 2018-04-13 RX ORDER — APIXABAN 2.5 MG/1
1 TABLET, FILM COATED ORAL
Qty: 0 | Refills: 0 | COMMUNITY

## 2018-04-13 RX ORDER — APIXABAN 2.5 MG/1
2.5 TABLET, FILM COATED ORAL EVERY 12 HOURS
Qty: 0 | Refills: 0 | Status: DISCONTINUED | OUTPATIENT
Start: 2018-04-13 | End: 2018-04-14

## 2018-04-13 RX ORDER — CIPROFLOXACIN LACTATE 400MG/40ML
1 VIAL (ML) INTRAVENOUS
Qty: 0 | Refills: 0 | COMMUNITY

## 2018-04-13 RX ORDER — METOPROLOL TARTRATE 50 MG
12.5 TABLET ORAL
Qty: 0 | Refills: 0 | Status: DISCONTINUED | OUTPATIENT
Start: 2018-04-13 | End: 2018-04-14

## 2018-04-13 RX ADMIN — HEPARIN SODIUM 5000 UNIT(S): 5000 INJECTION INTRAVENOUS; SUBCUTANEOUS at 07:04

## 2018-04-13 RX ADMIN — SENNA PLUS 2 TABLET(S): 8.6 TABLET ORAL at 21:50

## 2018-04-13 RX ADMIN — SODIUM CHLORIDE 3 MILLILITER(S): 9 INJECTION INTRAMUSCULAR; INTRAVENOUS; SUBCUTANEOUS at 21:55

## 2018-04-13 RX ADMIN — APIXABAN 2.5 MILLIGRAM(S): 2.5 TABLET, FILM COATED ORAL at 20:25

## 2018-04-13 RX ADMIN — Medication 12.5 MILLIGRAM(S): at 20:25

## 2018-04-13 RX ADMIN — TAMSULOSIN HYDROCHLORIDE 0.4 MILLIGRAM(S): 0.4 CAPSULE ORAL at 21:50

## 2018-04-13 RX ADMIN — CLOPIDOGREL BISULFATE 75 MILLIGRAM(S): 75 TABLET, FILM COATED ORAL at 15:33

## 2018-04-13 RX ADMIN — SODIUM CHLORIDE 3 MILLILITER(S): 9 INJECTION INTRAMUSCULAR; INTRAVENOUS; SUBCUTANEOUS at 14:28

## 2018-04-13 RX ADMIN — Medication 20 MILLIGRAM(S): at 07:03

## 2018-04-13 RX ADMIN — SODIUM CHLORIDE 3 MILLILITER(S): 9 INJECTION INTRAMUSCULAR; INTRAVENOUS; SUBCUTANEOUS at 07:04

## 2018-04-13 RX ADMIN — Medication 100 MILLIGRAM(S): at 14:29

## 2018-04-13 RX ADMIN — PANTOPRAZOLE SODIUM 40 MILLIGRAM(S): 20 TABLET, DELAYED RELEASE ORAL at 07:03

## 2018-04-13 RX ADMIN — ATORVASTATIN CALCIUM 20 MILLIGRAM(S): 80 TABLET, FILM COATED ORAL at 21:50

## 2018-04-13 RX ADMIN — Medication 40 MILLIEQUIVALENT(S): at 09:21

## 2018-04-13 RX ADMIN — Medication 20 MILLIGRAM(S): at 09:21

## 2018-04-13 RX ADMIN — Medication 20 MILLIGRAM(S): at 15:32

## 2018-04-13 NOTE — DISCHARGE NOTE ADULT - ADDITIONAL INSTRUCTIONS
-Please follow up with Dr. Cyr on 04/23/18.  The office is located at [facility], [facility], 4th floor. Call us  #[phone] to confirm the appointment.    -Walk daily as tolerated and use your incentive spirometer every hour.    -No driving or strenuous activity/exercise for 6 weeks, or until cleared by your surgeon.    -Gently clean your incisions with anti-bacterial soap and water, pat dry.  You may leave them open to air.    -Call your doctor if you have shortness of breath, chest pain not relieved by pain medication, dizziness, fever >101.5, or increased redness or drainage from incisions.

## 2018-04-13 NOTE — PROGRESS NOTE ADULT - SUBJECTIVE AND OBJECTIVE BOX
Patient discussed on morning rounds with Dr. Cyr    Operation / Date: 4/11/18 BAV and PCI    SUBJECTIVE ASSESSMENT:  89 y/o Male Indonesian speaking with PMHx of CAD (s/p CABG), systolic CHF, afib, aortic stenosis, mitral regurgitation, and BPH is POD#2 s/p BAV and PCI, EF 30%. Patient is OOBTC, +BM today, ambulated 2 times in the hallway yesterday, and is pulling 850ml - 1L on IS. Denies chest pain, SOB, dizziness, N/V/D or constipation. Translation with patient's son Wilfrido ([phone]).        Vital Signs Last 24 Hrs  T(C): 36.5 (13 Apr 2018 09:58), Max: 37.3 (12 Apr 2018 16:45)  T(F): 97.7 (13 Apr 2018 09:58), Max: 99.1 (12 Apr 2018 16:45)  HR: 91 (13 Apr 2018 08:45) (74 - 91)  BP: 143/- (13 Apr 2018 09:58) (118/63 - 143/81)  BP(mean): 87 (13 Apr 2018 05:31) (78 - 87)  RR: 18 (13 Apr 2018 08:45) (18 - 26)  SpO2: 93% (13 Apr 2018 08:45) (92% - 100%)  I&O's Detail    12 Apr 2018 07:01  -  13 Apr 2018 07:00  --------------------------------------------------------  IN:    Oral Fluid: 450 mL    sodium chloride 0.45%.: 30 mL  Total IN: 480 mL    OUT:    Voided: 1400 mL  Total OUT: 1400 mL    Total NET: -920 mL      13 Apr 2018 07:01  -  13 Apr 2018 12:12  --------------------------------------------------------  IN:    Oral Fluid: 350 mL  Total IN: 350 mL    OUT:  Total OUT: 0 mL    Total NET: 350 mL          CHEST TUBE:  No.   AUDREY DRAIN:  No.  EPICARDIAL WIRES: No.  TIE DOWNS: No.  GUTIERREZ: No.    PHYSICAL EXAM:    General: OOBTC, NAD    Neurological: A&Ox3. No focal deficits.    Cardiovascular: Irregular,    Respiratory: Clear to auscultation. No rales or rhonchi.     Gastrointestinal: Soft, non-tender, non-distended.    Extremities: +1 peripheral edema bilaterally. Normal range of motion.    Vascular: extremities warm to touch, no cyanosis.    Incision Sites: B/l groin incisions, small, healing well, dry, intact, no erythema.     LABS:                        10.7   6.4   )-----------( 151      ( 13 Apr 2018 05:51 )             34.0       COUMADIN:  Yes/No. REASON: .    PT/INR - ( 13 Apr 2018 05:51 )   PT: 13.7 sec;   INR: 1.23          PTT - ( 13 Apr 2018 05:51 )  PTT:35.7 sec    04-13    138  |  99  |  31<H>  ----------------------------<  132<H>  3.6   |  31  |  1.05    Ca    9.4      13 Apr 2018 05:51  Phos  4.0     04-12  Mg     2.3     04-13    TPro  6.2  /  Alb  3.3  /  TBili  0.8  /  DBili  x   /  AST  19  /  ALT  13  /  AlkPhos  70  04-12          MEDICATIONS  (STANDING):  apixaban 2.5 milliGRAM(s) Oral every 12 hours  atorvastatin 20 milliGRAM(s) Oral at bedtime  clopidogrel Tablet 75 milliGRAM(s) Oral daily  docusate sodium 100 milliGRAM(s) Oral daily  furosemide   Injectable 20 milliGRAM(s) IV Push <User Schedule>  metoprolol tartrate 12.5 milliGRAM(s) Oral <User Schedule>  pantoprazole    Tablet 40 milliGRAM(s) Oral before breakfast  senna 2 Tablet(s) Oral at bedtime  sodium chloride 0.45%. 1000 milliLiter(s) (30 mL/Hr) IV Continuous <Continuous>  sodium chloride 0.9% lock flush 3 milliLiter(s) IV Push every 8 hours  tamsulosin 0.4 milliGRAM(s) Oral at bedtime    MEDICATIONS  (PRN):  acetaminophen   Tablet. 325 milliGRAM(s) Oral every 4 hours PRN Moderate Pain (4 - 6)        RADIOLOGY & ADDITIONAL TESTS:    Post operative:< from: Echocardiogram (04.12.18 @ 16:51) >  The left ventricular ejection fraction is estimated   to be 30-35%  The right ventricle is normal in size and function. The left   atrium is severely dilated. The left atrial volume index is 57 cc/m2 (normal   <34cc/m2). Right atrial size is normal.   Calcified aortic valve. There is mild aortic regurgitation. There is Severe aortic stenosis. The peak pressure   gradient is 38 mmHg. The mean pressure gradient is 24 mmHg. The calculated aortic   valve area using the continuity equation is 0.8 cm2. The calculated stroke   volume index is 33 cc/m2 (normal >35cc/m2).    Tethered and thickened mitral valve leaflets with normal opening. There is severe mitral regurgitation.  The   effective regurgitant orifice, calculated by the PISA method, is 0.43 cm2. The   regurgitant volume, based on the PISA calculation, is 86 ml.    Structurally normal tricuspid valve. There is moderate tricuspid regurgitation.  The   pulmonic valve is not well visualized. No pulmonic regurgitation   noted. There is no pericardial effusion. Left pleural effusion noted. Compared to the   TTE performed on 4/5/2018, the transoartic gradients are smaller.   Severe mitral regurgitation is better appreciated on today's study.    < end of copied text >    Pre-operative: < from: TTE Echo w/Cont Complete (04.05.18 @ 09:32) >  The left ventricular ejection fraction   is estimated to be 30-35%The left atrium is dilated. The mitral inflow   pattern is consistent with restrictive left ventricular filling, suggestive of   severely elevated left atrial pressure (>20mmHg).    Structurally normal mitral valve. There is mild mitral regurgitation.   Structurally normal tricuspid.    The calculated aortic valve area using the continuity equation is 0.6 cm2.  Irregular heart rate with various aortic gradient: the mean aortic valve  area between 46 and 32mmHg  There is no pericardial effusion. Left pleural effusion noted.     < from: 12 Lead ECG (04.12.18 @ 04:22) >  Right bundle branch block  Left anterior fascicular block  *** Bifascicular block ***  Anterolateral infarct , age undetermined  Abnormal ECG    < end of copied text >    < from: 12 Lead ECG (04.12.18 @ 04:22) >  Right bundle branch block  Left anterior fascicular block  *** Bifascicular block ***  Anterolateral infarct , age undetermined  Abnormal ECG    < end of copied text >      < end of copied text >     Xray Chest 1 View- PORTABLE-Routine (04.12.18 @ 03:32) >  IMPRESSION: Persistent cardiomegaly, pulmonary congestion and pleural   effusions.    < end of copied text > Patient discussed on morning rounds with Dr. Cyr    Operation / Date: 4/11/18 BAV and PCI    SUBJECTIVE ASSESSMENT:  Patient is OOBTC, +BM today, ambulated 2 times in the hallway yesterday, and is pulling 850ml - 1L on IS. Denies chest pain, SOB, dizziness, N/V/D or constipation. Translation with patient's son Wilfrido ([phone]).        Vital Signs Last 24 Hrs  T(C): 36.5 (13 Apr 2018 09:58), Max: 37.3 (12 Apr 2018 16:45)  T(F): 97.7 (13 Apr 2018 09:58), Max: 99.1 (12 Apr 2018 16:45)  HR: 91 (13 Apr 2018 08:45) (74 - 91)  BP: 143/- (13 Apr 2018 09:58) (118/63 - 143/81)  BP(mean): 87 (13 Apr 2018 05:31) (78 - 87)  RR: 18 (13 Apr 2018 08:45) (18 - 26)  SpO2: 93% (13 Apr 2018 08:45) (92% - 100%)  I&O's Detail    12 Apr 2018 07:01  -  13 Apr 2018 07:00  --------------------------------------------------------  IN:    Oral Fluid: 450 mL    sodium chloride 0.45%.: 30 mL  Total IN: 480 mL    OUT:    Voided: 1400 mL  Total OUT: 1400 mL    Total NET: -920 mL      13 Apr 2018 07:01  -  13 Apr 2018 12:12  --------------------------------------------------------  IN:    Oral Fluid: 350 mL  Total IN: 350 mL    OUT:  Total OUT: 0 mL    Total NET: 350 mL          CHEST TUBE:  No.   AUDREY DRAIN:  No.  EPICARDIAL WIRES: No.  TIE DOWNS: No.  GUTIERREZ: No.    PHYSICAL EXAM:    General: OOBTC, NAD    Neurological: A&Ox3. No focal deficits.    Cardiovascular: Irregular, grade II/IV murmur heard at the right 2nd intercostal space.     Respiratory: Clear to auscultation. Small wheeze heard left upper chest. No rales or rhonchi.     Gastrointestinal: Soft, non-tender, non-distended.    Extremities: +1 peripheral edema bilaterally. Normal range of motion.    Vascular: extremities warm to touch, no cyanosis.    Incision Sites: B/l groin puncture, small, healing well, dry, intact, no erythema.     LABS:                        10.7   6.4   )-----------( 151      ( 13 Apr 2018 05:51 )             34.0       COUMADIN:  Yes/No. REASON: .    PT/INR - ( 13 Apr 2018 05:51 )   PT: 13.7 sec;   INR: 1.23          PTT - ( 13 Apr 2018 05:51 )  PTT:35.7 sec    04-13    138  |  99  |  31<H>  ----------------------------<  132<H>  3.6   |  31  |  1.05    Ca    9.4      13 Apr 2018 05:51  Phos  4.0     04-12  Mg     2.3     04-13    TPro  6.2  /  Alb  3.3  /  TBili  0.8  /  DBili  x   /  AST  19  /  ALT  13  /  AlkPhos  70  04-12          MEDICATIONS  (STANDING):  apixaban 2.5 milliGRAM(s) Oral every 12 hours  atorvastatin 20 milliGRAM(s) Oral at bedtime  clopidogrel Tablet 75 milliGRAM(s) Oral daily  docusate sodium 100 milliGRAM(s) Oral daily  furosemide   Injectable 20 milliGRAM(s) IV Push <User Schedule>  metoprolol tartrate 12.5 milliGRAM(s) Oral <User Schedule>  pantoprazole    Tablet 40 milliGRAM(s) Oral before breakfast  senna 2 Tablet(s) Oral at bedtime  sodium chloride 0.45%. 1000 milliLiter(s) (30 mL/Hr) IV Continuous <Continuous>  sodium chloride 0.9% lock flush 3 milliLiter(s) IV Push every 8 hours  tamsulosin 0.4 milliGRAM(s) Oral at bedtime    MEDICATIONS  (PRN):  acetaminophen   Tablet. 325 milliGRAM(s) Oral every 4 hours PRN Moderate Pain (4 - 6)        RADIOLOGY & ADDITIONAL TESTS:    Post operative:< from: Echocardiogram (04.12.18 @ 16:51) >  The left ventricular ejection fraction is estimated   to be 30-35%  The right ventricle is normal in size and function. The left   atrium is severely dilated. The left atrial volume index is 57 cc/m2 (normal   <34cc/m2). Right atrial size is normal.   Calcified aortic valve. There is mild aortic regurgitation. There is Severe aortic stenosis. The peak pressure   gradient is 38 mmHg. The mean pressure gradient is 24 mmHg. The calculated aortic   valve area using the continuity equation is 0.8 cm2. The calculated stroke   volume index is 33 cc/m2 (normal >35cc/m2).    Tethered and thickened mitral valve leaflets with normal opening. There is severe mitral regurgitation.  The   effective regurgitant orifice, calculated by the PISA method, is 0.43 cm2. The   regurgitant volume, based on the PISA calculation, is 86 ml.    Structurally normal tricuspid valve. There is moderate tricuspid regurgitation.  The   pulmonic valve is not well visualized. No pulmonic regurgitation   noted. There is no pericardial effusion. Left pleural effusion noted. Compared to the   TTE performed on 4/5/2018, the transoartic gradients are smaller.   Severe mitral regurgitation is better appreciated on today's study.    < end of copied text >    Pre-operative: < from: TTE Echo w/Cont Complete (04.05.18 @ 09:32) >  The left ventricular ejection fraction   is estimated to be 30-35%The left atrium is dilated. The mitral inflow   pattern is consistent with restrictive left ventricular filling, suggestive of   severely elevated left atrial pressure (>20mmHg).    Structurally normal mitral valve. There is mild mitral regurgitation.   Structurally normal tricuspid.    The calculated aortic valve area using the continuity equation is 0.6 cm2.  Irregular heart rate with various aortic gradient: the mean aortic valve  area between 46 and 32mmHg  There is no pericardial effusion. Left pleural effusion noted.     < from: 12 Lead ECG (04.12.18 @ 04:22) >  Right bundle branch block  Left anterior fascicular block  *** Bifascicular block ***  Anterolateral infarct , age undetermined  Abnormal ECG    < end of copied text >    < from: 12 Lead ECG (04.12.18 @ 04:22) >  Right bundle branch block  Left anterior fascicular block  *** Bifascicular block ***  Anterolateral infarct , age undetermined  Abnormal ECG    < end of copied text >      < end of copied text >     Xray Chest 1 View- PORTABLE-Routine (04.12.18 @ 03:32) >  IMPRESSION: Persistent cardiomegaly, pulmonary congestion and pleural   effusions.    < end of copied text >

## 2018-04-13 NOTE — PROGRESS NOTE ADULT - PROBLEM SELECTOR PLAN 4
Patient with Stable Angina Based on History. Troponin Elevated to 0.03. However, Likely Demand in Setting of CHF and Severe Aortic Stenosis.   -Troponin 0.04. Will Stop Trending, Likely Elevated in Setting of CHF and Severe AS. EKG Has Remained Unchanged.   -Continue Aspirin 81mg PO Q daily, Lipitor 20mg PO QHS.
s/p cath, 3 of 4 grafts closed. oRCA stented
remote CABG, reduced EF
Patient with Stable Angina Based on History. Troponin Elevated to 0.03. However, Likely Demand in Setting of CHF and Severe Aortic Stenosis.   -Troponin 0.04. Will Stop Trending, Likely Elevated in Setting of CHF and Severe AS. EKG Has Remained Unchanged.   -Continue Aspirin 81mg PO Q daily, Lipitor 20mg PO QHS.
remote CABG, reduced EF
Patient with Stable Angina Based on History. Troponin Elevated to 0.03. However, Likely Demand in Setting of CHF and Severe Aortic Stenosis.   - troponin likely elevated in setting of demand, no signs or symptoms of ACS   -Continue Aspirin 81mg PO Q daily, Lipitor 20mg PO QHS.
Patient with Stable Angina Based on History. Troponin Elevated to 0.03. However, Likely Demand in Setting of CHF and Severe Aortic Stenosis.   -Will Trend Troponin To Peak.   -Continue Aspirin 81mg PO Q daily, Lipitor 20mg PO QHS.
remote CABG, reduced EF.  Vague discomfort today.  EKG unchanged, trops elevated but flat, up most likely from CHF
remote CABG, reduced EF.  Vague discomfort today.  EKG unchanged, trops elevated but flat, up most likely from CHF
remote CABG, reduced EF.  Vague discomfort today.  EKG unchanged, trops pending
s/p cath, 3 of 4 gragts closed. oRCA stented
Patient with Stable Angina Based on History. Troponin Elevated to 0.03. However, Likely Demand in Setting of CHF and Severe Aortic Stenosis.   -Troponin 0.04. Will Stop Trending, Likely Elevated in Setting of CHF and Severe AS. EKG Has Remained Unchanged.   -Continue Aspirin 81mg PO Q daily, Lipitor 20mg PO QHS.
Patient with Stable Angina Based on History. Troponin Elevated to 0.03. However, Likely Demand in Setting of CHF and Severe Aortic Stenosis.   - troponin peak at .04  -Continue Aspirin 81mg PO Q daily, Lipitor 20mg PO QHS.

## 2018-04-13 NOTE — PROGRESS NOTE ADULT - PROBLEM SELECTOR PROBLEM 9
Nutrition, metabolism, and development symptoms

## 2018-04-13 NOTE — PROGRESS NOTE ADULT - PROBLEM SELECTOR PLAN 1
Patient with History of Severe Aortic Stenosis. Noted on Echocardiogram Performed at [facility] Before Admission.   - Structural Heart Consulted, s/p PTAV, DC home Sat

## 2018-04-13 NOTE — PROGRESS NOTE ADULT - PROBLEM SELECTOR PROBLEM 7
BPH (benign prostatic hyperplasia)

## 2018-04-13 NOTE — PROGRESS NOTE ADULT - PROBLEM SELECTOR PROBLEM 8
Preventive measure

## 2018-04-13 NOTE — DISCHARGE NOTE ADULT - PLAN OF CARE
s/p BAV return to see Dr. Cyr in 1-2 weeks after discharge   continue current care  plan for TAVR intervention in near future s/p PCI start plavix continue current BB for rate control  resume eliquis for anticoagulation

## 2018-04-13 NOTE — PROGRESS NOTE ADULT - PROBLEM SELECTOR PLAN 5
Statin As Above
Should be on a statin
Statin As Above
Should be on a statin, continue Lipitor 20mg
Statin As Above

## 2018-04-13 NOTE — PROGRESS NOTE ADULT - PROBLEM SELECTOR PLAN 7
Stable, Continue Flomax 0.4mg PO QHS.

## 2018-04-13 NOTE — PROGRESS NOTE ADULT - PROBLEM SELECTOR PLAN 6
Continue Pantoprazole 40mg PO Q daily.
surgery postponed due to heart failure and severe AS.  Will need to get him out of heart failure and fix his valve with a TAVR.  Dr Cyr aware
Continue Pantoprazole 40mg PO Q daily.
surgery postponed due to heart failure and severe AS.  Will need to get him out of heart failure and fix his valve with a TAVR.  Dr Cyr aware
Continue Pantoprazole 40mg PO Q daily.

## 2018-04-13 NOTE — PROGRESS NOTE ADULT - PROBLEM SELECTOR PROBLEM 6
GERD (gastroesophageal reflux disease)
Hernia
GERD (gastroesophageal reflux disease)
Hernia
GERD (gastroesophageal reflux disease)
GERD (gastroesophageal reflux disease)
Hernia
GERD (gastroesophageal reflux disease)

## 2018-04-13 NOTE — PROGRESS NOTE ADULT - PROBLEM SELECTOR PLAN 3
Stable. CHADSVASC2 4.   -Continue Heparin Drip, Monitor PTT and Adjust Heparin Drip Accordingly.   -Holding Beta Blocker For Now in Setting of Severe AS.
Stable. CHADSVASC2 4.   Will be on Plavix and Eliquis
Will need a good diuresis.  Wt 147 lbs
Stable. CHADSVASC2 4.   -Continue Heparin Drip, Monitor PTT and Adjust Heparin Drip Accordingly.   -Holding Beta Blocker For Now in Setting of Severe AS. Rate currently controlled
Will need a good diuresis.  Wt 147 lbs.  Follow daily weights
Stable.   - transition to heparin tomorrow, on eliquis   - slow afib o/n. if persistent consider EP consult.
Stable. CHADSVASC2 4.   -Continue Heparin Drip, Monitor PTT and Adjust Heparin Drip Accordingly.   - Holding Beta Blocker For Now in Setting of Severe AS. Rate currently controlled
Stable. CHADSVASC2 4.   Will be on Plavix and Eliquis
Will need a good diuresis.  Wt 147 lbs.  Follow daily weights
Will need a good diuresis.  Wt 147 lbs.  Follow daily weights.  Wt loss poor, IV Lasix BID
Will need a good diuresis.  Wt 147 lbs.  Follow daily weights.  Wt loss poor, IV Lasix BID.  Wt 143 lbs
Stable. CHADSVASC2 4.   -Continue Heparin Drip, Monitor PTT and Adjust Heparin Drip Accordingly.   -Holding Beta Blocker For Now in Setting of Severe AS.
Stable.   - heparin gtt   - slow afib o/n. if persistent consider EP consult.

## 2018-04-13 NOTE — DISCHARGE NOTE ADULT - MEDICATION SUMMARY - MEDICATIONS TO TAKE
I will START or STAY ON the medications listed below when I get home from the hospital:    aspirin 81 mg oral tablet  -- 1 tab(s) by mouth once a day  -- Indication: For Antiplatelet    losartan 25 mg oral tablet  -- 1 tab(s) by mouth once a day  -- Indication: For Congestive heart failure    Flomax 0.4 mg oral capsule  -- 1 cap(s) by mouth once a day  -- Indication: For BPH (benign prostatic hyperplasia)    Eliquis 2.5 mg oral tablet  -- 1 tab(s) by mouth 2 times a day   -- Indication: For A-fib    Lipitor 20 mg oral tablet  -- 1 tab(s) by mouth once a day  -- Indication: For Antihyperlipidemia    clopidogrel 75 mg oral tablet  -- 1 tab(s) by mouth once a day  -- Indication: For Antiplatelet    metoprolol succinate 25 mg oral tablet, extended release  -- 1 tab(s) by mouth once a day   -- Indication: For Antihypertension     Lasix 40 mg oral tablet  -- 1 tab(s) by mouth once a day  -- Indication: For diuresis    Protonix 40 mg oral delayed release tablet  -- 1 tab(s) by mouth once a day  -- Indication: For GI PPX I will START or STAY ON the medications listed below when I get home from the hospital:    losartan 25 mg oral tablet  -- 1 tab(s) by mouth once a day  -- Indication: For Congestive heart failure    Flomax 0.4 mg oral capsule  -- 1 cap(s) by mouth once a day  -- Indication: For BPH (benign prostatic hyperplasia)    Eliquis 2.5 mg oral tablet  -- 1 tab(s) by mouth 2 times a day   -- Indication: For A-fib    Lipitor 20 mg oral tablet  -- 1 tab(s) by mouth once a day  -- Indication: For Antihyperlipidemia    clopidogrel 75 mg oral tablet  -- 1 tab(s) by mouth once a day  -- Indication: For Antiplatelet    metoprolol succinate 25 mg oral tablet, extended release  -- 1 tab(s) by mouth once a day   -- Indication: For Antihypertension     Lasix 40 mg oral tablet  -- 1 tab(s) by mouth once a day  -- Indication: For diuresis    Protonix 40 mg oral delayed release tablet  -- 1 tab(s) by mouth once a day  -- Indication: For GI PPX I will START or STAY ON the medications listed below when I get home from the hospital:    losartan 25 mg oral tablet  -- 1 tab(s) by mouth once a day  -- Indication: For Hypertension    Flomax 0.4 mg oral capsule  -- 1 cap(s) by mouth once a day  -- Indication: For BPH (benign prostatic hyperplasia)    Eliquis 2.5 mg oral tablet  -- 1 tab(s) by mouth 2 times a day   -- Indication: For Anticoagulation    Lipitor 20 mg oral tablet  -- 1 tab(s) by mouth once a day  -- Indication: For Hyperlipidemia    clopidogrel 75 mg oral tablet  -- 1 tab(s) by mouth once a day  -- Indication: For Antiplatelet    metoprolol succinate 25 mg oral tablet, extended release  -- 1 tab(s) by mouth once a day   -- Indication: For Antihypertension     Lasix 40 mg oral tablet  -- 1 tab(s) by mouth once a day  -- Indication: For diureiss    Protonix 40 mg oral delayed release tablet  -- 1 tab(s) by mouth once a day  -- Indication: For Protonix

## 2018-04-13 NOTE — PROGRESS NOTE ADULT - PROBLEM SELECTOR PROBLEM 3
Atrial fibrillation
Acute on chronic combined systolic and diastolic congestive heart failure
Atrial fibrillation
Acute on chronic combined systolic and diastolic congestive heart failure
Atrial fibrillation

## 2018-04-13 NOTE — PROGRESS NOTE ADULT - SUBJECTIVE AND OBJECTIVE BOX
INTERVAL HISTORY:  Doing well post stent and PTAV  	  MEDICATIONS:  furosemide    Tablet 20 milliGRAM(s) Oral daily  metoprolol tartrate 12.5 milliGRAM(s) Oral every 8 hours  tamsulosin 0.4 milliGRAM(s) Oral at bedtime        acetaminophen   Tablet. 325 milliGRAM(s) Oral every 4 hours PRN    docusate sodium 100 milliGRAM(s) Oral daily  pantoprazole    Tablet 40 milliGRAM(s) Oral before breakfast  senna 2 Tablet(s) Oral at bedtime    atorvastatin 20 milliGRAM(s) Oral at bedtime    apixaban 2.5 milliGRAM(s) Oral every 12 hours  clopidogrel Tablet 75 milliGRAM(s) Oral daily  sodium chloride 0.45%. 1000 milliLiter(s) IV Continuous <Continuous>  sodium chloride 0.9% lock flush 3 milliLiter(s) IV Push every 8 hours        PHYSICAL EXAM:  T(C): 36.2 (04-13-18 @ 05:00), Max: 37.3 (04-12-18 @ 16:45)  HR: 90 (04-13-18 @ 05:31) (72 - 90)  BP: 131/60 (04-13-18 @ 05:31) (100/45 - 140/60)  RR: 18 (04-13-18 @ 05:31) (18 - 28)  SpO2: 92% (04-13-18 @ 05:31) (92% - 100%)  Wt(kg): --  I&O's Summary    12 Apr 2018 07:01  -  13 Apr 2018 07:00  --------------------------------------------------------  IN: 480 mL / OUT: 1400 mL / NET: -920 mL          Appearance: Normal	  HEENT:   Normal oral mucosa, PERRL, EOMI	  Lymphatic: No lymphadenopathy  Cardiovascular: Normal S1 S2, No JVD, AS murmur, + edema  Respiratory: Lungs clear to auscultation	  Psychiatry: A & O x 3, Mood & affect appropriate  Gastrointestinal:  Soft, Non-tender, + BS	  Skin: No rashes, No ecchymoses, No cyanosis  Neurologic: Non-focal  Extremities: Normal range of motion, No clubbing, cyanosis , + edema  Vascular: Peripheral pulses palpable 2+ bilaterally    TELEMETRY: 	    ECG:  	  RADIOLOGY:   DIAGNOSTIC TESTING:  [ ] Echocardiogram:  [ ]  Catheterization:  [ ] Stress Test:    OTHER: 	    LABS:	 	    CARDIAC MARKERS:                                  10.7   6.4   )-----------( 151      ( 13 Apr 2018 05:51 )             34.0     04-13    138  |  99  |  31<H>  ----------------------------<  132<H>  3.6   |  31  |  1.05    Ca    9.4      13 Apr 2018 05:51  Phos  4.0     04-12  Mg     2.3     04-13    TPro  6.2  /  Alb  3.3  /  TBili  0.8  /  DBili  x   /  AST  19  /  ALT  13  /  AlkPhos  70  04-12    proBNP:   Lipid Profile:   HgA1c:   TSH:     ASSESSMENT/PLAN:

## 2018-04-13 NOTE — PROGRESS NOTE ADULT - PROBLEM SELECTOR PROBLEM 4
Coronary artery disease
Coronary artery disease
ASHD (arteriosclerotic heart disease)
Coronary artery disease
ASHD (arteriosclerotic heart disease)
Coronary artery disease

## 2018-04-13 NOTE — PROGRESS NOTE ADULT - ASSESSMENT
88 y/o Male POD#2 BAV and PCI, doing well post-operatively, ambulating, +BM, using IS pulling 850L.    Neuro: No acute issues.    CV: Continue Plavix, Atorvastatin for graft ppx.  - Eliquis started today.  - Metoprolol started today.  - As per echo (impression above), severe mitral regurgitation better visualized on post-op echo. Moderate tricuspid regurgitation noted post-op echo. Discussed with Nora HOUSE Fellow. No further invention.  - EKG shows RBBB, Bifasicular block, pre and post operatively. Metoprolol 12.5mg reduced from q8 to BID today.    Pulm:  Patient with good inspiratory effort, pulling 850ml to 1L. Patient spO2 93 on room air.   CXR impression above.  - Lasix switched from PO to IV Lasix today as per Dr. Morfin.   - Continue ambulation.  - Continue IS 10x/hr.    GI: Patient presented to [facility] for elective hernia repair. Will discuss with patient's son Wilfrido ([phone]) on details of event.    : no acute events.  - Monitor Ins and Outs, patient on IV lasix.    ID: No acute issues.    PPX:    - continue ambulation.  - continue use of SCDs.  - Patient on Eliquis for DVT ppx. 89 y/o Male [place] speaking with PMHx of CAD (s/p CABG), systolic CHF, afib, aortic stenosis, mitral regurgitation, and BPH is POD#2 s/p BAV and PCI, EF 30%. Patient doing well post-operatively, ambulating, +BM, using IS pulling 850L.    Neuro: No acute issues.    CV: Continue Plavix, Atorvastatin for graft ppx.  - Eliquis started today.  - Metoprolol started today.  - As per echo (impression above), severe mitral regurgitation better visualized on post-op echo. Moderate tricuspid regurgitation noted post-op echo. Discussed with Nora HOUSE Fellow. No further invention.  - EKG shows RBBB, Bifasicular block, pre and post operatively. Metoprolol 12.5mg reduced from q8 to BID today.    Pulm:  Patient with good inspiratory effort, pulling 850ml to 1L. Patient spO2 93 on room air.   CXR impression above.  - Lasix switched from PO to IV Lasix today as per Dr. Morfin.   - Continue ambulation.  - Continue IS 10x/hr.    GI: Patient presented to [facility] for elective hernia repair. Will discuss with patient's son Wilfrido ([phone]) on details of event.  - continue bowel regimen.     : no acute events.  - Monitor Ins and Outs, patient on IV lasix.    ID: No acute issues.    PPX:    - continue ambulation.  - continue use of SCDs.  - Patient on Eliquis for DVT ppx. 89 y/o Male [place] speaking with PMHx of CAD (s/p CABG), systolic CHF, afib, aortic stenosis, mitral regurgitation, and BPH is POD#2 s/p BAV and PCI, EF 30%. Patient doing well post-operatively, ambulating, +BM, using IS pulling 850L.    Neuro: No acute issues.    CV: Continue Plavix, Atorvastatin for graft ppx.  - Home Eliquis resumed today.  - Metoprolol started today.  - As per echo (impression above), severe mitral regurgitation better visualized on post-op echo. Moderate tricuspid regurgitation noted post-op echo. Discussed with Nora HOUSE Fellow. No further invention.  - EKG shows RBBB, Bifasicular block, pre and post operatively. Metoprolol 12.5mg reduced from q8 to BID today.    Pulm:  Patient with good inspiratory effort, pulling 850ml to 1L. Patient spO2 93 on room air.   CXR impression above.  - Lasix switched from PO to IV Lasix today as per Dr. Morfin.   - Continue ambulation.  - Continue IS 10x/hr.    GI: Patient presented to [facility] for elective hernia repair. Will discuss with patient's son Wilfrido ([phone]) on details of event.  - continue bowel regimen.     : no acute events.  - Monitor Ins and Outs, patient on IV lasix.    ID: No acute issues.    PPX:    - continue ambulation.  - continue use of SCDs.  - Patient on Eliquis for DVT ppx.

## 2018-04-13 NOTE — PROGRESS NOTE ADULT - PROBLEM SELECTOR PROBLEM 2
Congestive heart failure
Chronic atrial fibrillation
Congestive heart failure
Atrial fibrillation
Congestive heart failure
Chronic atrial fibrillation
Congestive heart failure

## 2018-04-13 NOTE — PROGRESS NOTE ADULT - PROBLEM SELECTOR PROBLEM 5
Hyperlipidemia
Hyperlipidemia
High cholesterol
Hyperlipidemia
High cholesterol
Hyperlipidemia

## 2018-04-13 NOTE — PROGRESS NOTE ADULT - PROBLEM SELECTOR PROBLEM 1
Nonrheumatic aortic valve stenosis
Aortic stenosis
Nonrheumatic aortic valve stenosis
Aortic stenosis
Nonrheumatic aortic valve stenosis
Aortic stenosis

## 2018-04-13 NOTE — DISCHARGE NOTE ADULT - CARE PLAN
Principal Discharge DX:	Aortic stenosis  Goal:	s/p BAV  Assessment and plan of treatment:	return to see Dr. Cyr in 1-2 weeks after discharge   continue current care  plan for TAVR intervention in near future  Secondary Diagnosis:	Coronary artery disease  Goal:	s/p PCI  Assessment and plan of treatment:	start plavix  Secondary Diagnosis:	Afib  Assessment and plan of treatment:	continue current BB for rate control  resume eliquis for anticoagulation

## 2018-04-13 NOTE — DISCHARGE NOTE ADULT - HOSPITAL COURSE
This 89 year-old male with PMH significant for CAD (s/p CABG), systolic CHF, a-fib (on eliquis), known aortic stenosis, mitral regurgitation, and BPH presented to this admission with CHF exacerbation while he was under evaluation for his hernia repair.  His cardiac evaluation has revealed worsening aortic stenosis and CAD.  After being medical managed of his CHF exacerbation, patient underwent BAV and PCI.  He tolerated the procedures well.  On POD1-2, patient recovered from the procedures and hemodynamically stable.  He resumes most of his home medications to meet his needs.  On POD#3, patient is afebrile.  Hemodynamically stable.  Patient is discharged to home for further care with his family.  He will follow up with Dr. Cyr in 1-2 weeks in the office to discuss further treatment plan.

## 2018-04-13 NOTE — DISCHARGE NOTE ADULT - PATIENT PORTAL LINK FT
You can access the [facility] Patient Portal, offered by [facility], by registering with the following website: [email or web address]

## 2018-04-14 VITALS
DIASTOLIC BLOOD PRESSURE: 54 MMHG | HEART RATE: 66 BPM | OXYGEN SATURATION: 96 % | SYSTOLIC BLOOD PRESSURE: 122 MMHG | RESPIRATION RATE: 16 BRPM

## 2018-04-14 PROCEDURE — 82945 GLUCOSE OTHER FLUID: CPT

## 2018-04-14 PROCEDURE — 36415 COLL VENOUS BLD VENIPUNCTURE: CPT

## 2018-04-14 PROCEDURE — 85610 PROTHROMBIN TIME: CPT

## 2018-04-14 PROCEDURE — 94150 VITAL CAPACITY TEST: CPT

## 2018-04-14 PROCEDURE — 80048 BASIC METABOLIC PNL TOTAL CA: CPT

## 2018-04-14 PROCEDURE — C1894: CPT

## 2018-04-14 PROCEDURE — 84443 ASSAY THYROID STIM HORMONE: CPT

## 2018-04-14 PROCEDURE — 84132 ASSAY OF SERUM POTASSIUM: CPT

## 2018-04-14 PROCEDURE — 82728 ASSAY OF FERRITIN: CPT

## 2018-04-14 PROCEDURE — 75573 CT HRT C+ STRUX CGEN HRT DS: CPT

## 2018-04-14 PROCEDURE — 85027 COMPLETE CBC AUTOMATED: CPT

## 2018-04-14 PROCEDURE — 82803 BLOOD GASES ANY COMBINATION: CPT

## 2018-04-14 PROCEDURE — C1874: CPT

## 2018-04-14 PROCEDURE — 94640 AIRWAY INHALATION TREATMENT: CPT

## 2018-04-14 PROCEDURE — 84466 ASSAY OF TRANSFERRIN: CPT

## 2018-04-14 PROCEDURE — C1889: CPT

## 2018-04-14 PROCEDURE — 89051 BODY FLUID CELL COUNT: CPT

## 2018-04-14 PROCEDURE — 83550 IRON BINDING TEST: CPT

## 2018-04-14 PROCEDURE — 82746 ASSAY OF FOLIC ACID SERUM: CPT

## 2018-04-14 PROCEDURE — 99285 EMERGENCY DEPT VISIT HI MDM: CPT | Mod: 25

## 2018-04-14 PROCEDURE — 83615 LACTATE (LD) (LDH) ENZYME: CPT

## 2018-04-14 PROCEDURE — 83735 ASSAY OF MAGNESIUM: CPT

## 2018-04-14 PROCEDURE — 82550 ASSAY OF CK (CPK): CPT

## 2018-04-14 PROCEDURE — 84100 ASSAY OF PHOSPHORUS: CPT

## 2018-04-14 PROCEDURE — 94660 CPAP INITIATION&MGMT: CPT

## 2018-04-14 PROCEDURE — 86901 BLOOD TYPING SEROLOGIC RH(D): CPT

## 2018-04-14 PROCEDURE — 74018 RADEX ABDOMEN 1 VIEW: CPT

## 2018-04-14 PROCEDURE — 83605 ASSAY OF LACTIC ACID: CPT

## 2018-04-14 PROCEDURE — 97161 PT EVAL LOW COMPLEX 20 MIN: CPT

## 2018-04-14 PROCEDURE — 84295 ASSAY OF SERUM SODIUM: CPT

## 2018-04-14 PROCEDURE — C1729: CPT

## 2018-04-14 PROCEDURE — 70450 CT HEAD/BRAIN W/O DYE: CPT

## 2018-04-14 PROCEDURE — 80076 HEPATIC FUNCTION PANEL: CPT

## 2018-04-14 PROCEDURE — 81001 URINALYSIS AUTO W/SCOPE: CPT

## 2018-04-14 PROCEDURE — 93005 ELECTROCARDIOGRAM TRACING: CPT

## 2018-04-14 PROCEDURE — 86900 BLOOD TYPING SEROLOGIC ABO: CPT

## 2018-04-14 PROCEDURE — 83036 HEMOGLOBIN GLYCOSYLATED A1C: CPT

## 2018-04-14 PROCEDURE — 71045 X-RAY EXAM CHEST 1 VIEW: CPT | Mod: 26

## 2018-04-14 PROCEDURE — 74174 CTA ABD&PLVS W/CONTRAST: CPT

## 2018-04-14 PROCEDURE — 93306 TTE W/DOPPLER COMPLETE: CPT

## 2018-04-14 PROCEDURE — C1887: CPT

## 2018-04-14 PROCEDURE — 82330 ASSAY OF CALCIUM: CPT

## 2018-04-14 PROCEDURE — 97116 GAIT TRAINING THERAPY: CPT

## 2018-04-14 PROCEDURE — 32555 ASPIRATE PLEURA W/ IMAGING: CPT

## 2018-04-14 PROCEDURE — 86850 RBC ANTIBODY SCREEN: CPT

## 2018-04-14 PROCEDURE — 87070 CULTURE OTHR SPECIMN AEROBIC: CPT

## 2018-04-14 PROCEDURE — 86923 COMPATIBILITY TEST ELECTRIC: CPT

## 2018-04-14 PROCEDURE — 80053 COMPREHEN METABOLIC PANEL: CPT

## 2018-04-14 PROCEDURE — C1769: CPT

## 2018-04-14 PROCEDURE — 83880 ASSAY OF NATRIURETIC PEPTIDE: CPT

## 2018-04-14 PROCEDURE — 82607 VITAMIN B-12: CPT

## 2018-04-14 PROCEDURE — P9045: CPT

## 2018-04-14 PROCEDURE — C1760: CPT

## 2018-04-14 PROCEDURE — 84484 ASSAY OF TROPONIN QUANT: CPT

## 2018-04-14 PROCEDURE — 71045 X-RAY EXAM CHEST 1 VIEW: CPT

## 2018-04-14 PROCEDURE — C1725: CPT

## 2018-04-14 PROCEDURE — 80061 LIPID PANEL: CPT

## 2018-04-14 PROCEDURE — C8929: CPT

## 2018-04-14 PROCEDURE — 97164 PT RE-EVAL EST PLAN CARE: CPT

## 2018-04-14 PROCEDURE — 85730 THROMBOPLASTIN TIME PARTIAL: CPT

## 2018-04-14 PROCEDURE — 87075 CULTR BACTERIA EXCEPT BLOOD: CPT

## 2018-04-14 PROCEDURE — 93880 EXTRACRANIAL BILAT STUDY: CPT

## 2018-04-14 PROCEDURE — 85025 COMPLETE CBC W/AUTO DIFF WBC: CPT

## 2018-04-14 PROCEDURE — 82553 CREATINE MB FRACTION: CPT

## 2018-04-14 PROCEDURE — 87205 SMEAR GRAM STAIN: CPT

## 2018-04-14 PROCEDURE — C1726: CPT

## 2018-04-14 RX ORDER — TAMSULOSIN HYDROCHLORIDE 0.4 MG/1
1 CAPSULE ORAL
Qty: 30 | Refills: 0 | COMMUNITY
Start: 2018-04-14 | End: 2018-05-13

## 2018-04-14 RX ORDER — ATORVASTATIN CALCIUM 80 MG/1
1 TABLET, FILM COATED ORAL
Qty: 30 | Refills: 0 | COMMUNITY
Start: 2018-04-14 | End: 2018-05-13

## 2018-04-14 RX ORDER — LOSARTAN POTASSIUM 100 MG/1
1 TABLET, FILM COATED ORAL
Qty: 30 | Refills: 0 | OUTPATIENT
Start: 2018-04-14 | End: 2018-05-13

## 2018-04-14 RX ORDER — PANTOPRAZOLE SODIUM 20 MG/1
1 TABLET, DELAYED RELEASE ORAL
Qty: 30 | Refills: 0 | COMMUNITY
Start: 2018-04-14 | End: 2018-05-13

## 2018-04-14 RX ORDER — ASPIRIN/CALCIUM CARB/MAGNESIUM 324 MG
1 TABLET ORAL
Qty: 30 | Refills: 0 | OUTPATIENT
Start: 2018-04-14 | End: 2018-05-13

## 2018-04-14 RX ORDER — PANTOPRAZOLE SODIUM 20 MG/1
1 TABLET, DELAYED RELEASE ORAL
Qty: 0 | Refills: 0 | COMMUNITY

## 2018-04-14 RX ORDER — FUROSEMIDE 40 MG
1 TABLET ORAL
Qty: 30 | Refills: 0 | COMMUNITY
Start: 2018-04-14 | End: 2018-05-13

## 2018-04-14 RX ORDER — ATORVASTATIN CALCIUM 80 MG/1
1 TABLET, FILM COATED ORAL
Qty: 30 | Refills: 0 | OUTPATIENT
Start: 2018-04-14 | End: 2018-05-13

## 2018-04-14 RX ORDER — TAMSULOSIN HYDROCHLORIDE 0.4 MG/1
1 CAPSULE ORAL
Qty: 30 | Refills: 0 | OUTPATIENT
Start: 2018-04-14 | End: 2018-05-13

## 2018-04-14 RX ORDER — FUROSEMIDE 40 MG
1 TABLET ORAL
Qty: 30 | Refills: 0 | OUTPATIENT
Start: 2018-04-14 | End: 2018-05-13

## 2018-04-14 RX ORDER — METOPROLOL TARTRATE 50 MG
1 TABLET ORAL
Qty: 30 | Refills: 0 | COMMUNITY
Start: 2018-04-14 | End: 2018-05-13

## 2018-04-14 RX ORDER — APIXABAN 2.5 MG/1
1 TABLET, FILM COATED ORAL
Qty: 60 | Refills: 0 | OUTPATIENT
Start: 2018-04-14 | End: 2018-05-13

## 2018-04-14 RX ORDER — FUROSEMIDE 40 MG
1 TABLET ORAL
Qty: 0 | Refills: 0 | COMMUNITY

## 2018-04-14 RX ORDER — APIXABAN 2.5 MG/1
1 TABLET, FILM COATED ORAL
Qty: 60 | Refills: 0 | COMMUNITY
Start: 2018-04-14 | End: 2018-05-13

## 2018-04-14 RX ORDER — CLOPIDOGREL BISULFATE 75 MG/1
1 TABLET, FILM COATED ORAL
Qty: 30 | Refills: 0 | COMMUNITY
Start: 2018-04-14 | End: 2018-05-13

## 2018-04-14 RX ORDER — APIXABAN 2.5 MG/1
1 TABLET, FILM COATED ORAL
Qty: 0 | Refills: 0 | COMMUNITY

## 2018-04-14 RX ORDER — PANTOPRAZOLE SODIUM 20 MG/1
1 TABLET, DELAYED RELEASE ORAL
Qty: 30 | Refills: 0 | OUTPATIENT
Start: 2018-04-14 | End: 2018-05-13

## 2018-04-14 RX ORDER — LOSARTAN POTASSIUM 100 MG/1
1 TABLET, FILM COATED ORAL
Qty: 30 | Refills: 0 | COMMUNITY
Start: 2018-04-14 | End: 2018-05-13

## 2018-04-14 RX ORDER — LOSARTAN POTASSIUM 100 MG/1
1 TABLET, FILM COATED ORAL
Qty: 0 | Refills: 0 | COMMUNITY

## 2018-04-14 RX ORDER — METOPROLOL TARTRATE 50 MG
1 TABLET ORAL
Qty: 30 | Refills: 0 | OUTPATIENT
Start: 2018-04-14 | End: 2018-05-13

## 2018-04-14 RX ORDER — ASPIRIN/CALCIUM CARB/MAGNESIUM 324 MG
1 TABLET ORAL
Qty: 0 | Refills: 0 | COMMUNITY

## 2018-04-14 RX ORDER — CLOPIDOGREL BISULFATE 75 MG/1
1 TABLET, FILM COATED ORAL
Qty: 30 | Refills: 0 | OUTPATIENT
Start: 2018-04-14 | End: 2018-05-13

## 2018-04-14 RX ORDER — LOSARTAN POTASSIUM 100 MG/1
25 TABLET, FILM COATED ORAL DAILY
Qty: 0 | Refills: 0 | Status: DISCONTINUED | OUTPATIENT
Start: 2018-04-14 | End: 2018-04-14

## 2018-04-14 RX ORDER — FUROSEMIDE 40 MG
40 TABLET ORAL DAILY
Qty: 0 | Refills: 0 | Status: DISCONTINUED | OUTPATIENT
Start: 2018-04-14 | End: 2018-04-14

## 2018-04-14 RX ORDER — TAMSULOSIN HYDROCHLORIDE 0.4 MG/1
1 CAPSULE ORAL
Qty: 0 | Refills: 0 | COMMUNITY

## 2018-04-14 RX ORDER — METOPROLOL TARTRATE 50 MG
1 TABLET ORAL
Qty: 0 | Refills: 0 | COMMUNITY

## 2018-04-14 RX ORDER — ATORVASTATIN CALCIUM 80 MG/1
1 TABLET, FILM COATED ORAL
Qty: 0 | Refills: 0 | COMMUNITY

## 2018-04-14 RX ADMIN — LOSARTAN POTASSIUM 25 MILLIGRAM(S): 100 TABLET, FILM COATED ORAL at 12:34

## 2018-04-14 RX ADMIN — Medication 325 MILLIGRAM(S): at 01:09

## 2018-04-14 RX ADMIN — CLOPIDOGREL BISULFATE 75 MILLIGRAM(S): 75 TABLET, FILM COATED ORAL at 12:33

## 2018-04-14 RX ADMIN — PANTOPRAZOLE SODIUM 40 MILLIGRAM(S): 20 TABLET, DELAYED RELEASE ORAL at 06:04

## 2018-04-14 RX ADMIN — Medication 100 MILLIGRAM(S): at 12:33

## 2018-04-14 RX ADMIN — SODIUM CHLORIDE 3 MILLILITER(S): 9 INJECTION INTRAMUSCULAR; INTRAVENOUS; SUBCUTANEOUS at 06:03

## 2018-04-14 RX ADMIN — Medication 325 MILLIGRAM(S): at 02:00

## 2018-04-14 RX ADMIN — APIXABAN 2.5 MILLIGRAM(S): 2.5 TABLET, FILM COATED ORAL at 06:04

## 2018-04-14 RX ADMIN — Medication 20 MILLIGRAM(S): at 06:04

## 2018-04-14 RX ADMIN — Medication 12.5 MILLIGRAM(S): at 06:04

## 2018-04-14 NOTE — PROGRESS NOTE ADULT - PROVIDER SPECIALTY LIST ADULT
CT Surgery
CT Surgery
Cardiology
Critical Care
Structural Heart
Cardiology

## 2018-04-14 NOTE — PROGRESS NOTE ADULT - SUBJECTIVE AND OBJECTIVE BOX
Patient discussed on morning rounds with Dr. Cyr    Operation / Date: 4/11/18 BAV and PCI placement    Surgeon: MD Klarissa    Referring Physician:     SUBJECTIVE ASSESSMENT:  89y Male with PMH significant for aortic stenosis and CAD underwent BAV and PCI who is doing well.  He has weaned off his oxygenation support and sitting up comfortably.  He states he has not complains.      Hospital Course:  This 89 year-old male with PMH significant for CAD (s/p CABG), systolic CHF, a-fib (on eliquis), known aortic stenosis, mitral regurgitation, and BPH presented to this admission with CHF exacerbation while he was under evaluation for his hernia repair.  His cardiac evaluation has revealed worsening aortic stenosis and CAD.  After being medical managed of his CHF exacerbation, patient underwent BAV and PCI.  He tolerated the procedures well.  On POD1-2, patient recovered from the procedures and hemodynamically stable.  He resumes most of his home medications to meet his needs.  On POD#3, patient is afebrile.  Hemodynamically stable.  Patient is discharged to home for further care with his family.  He will follow up with Dr. Cyr in 1-2 weeks in the office to discuss further treatment plan.    Vital Signs Last 24 Hrs    T(C): 35.8 (14 Apr 2018 10:00), Max: 36.6 (13 Apr 2018 20:22)  T(F): 96.5 (14 Apr 2018 10:00), Max: 97.8 (13 Apr 2018 20:22)  HR: 66 (14 Apr 2018 09:07) (66 - 74)  BP: 118/57 (14 Apr 2018 09:07) (110/58 - 136/64)  BP(mean): 82 (14 Apr 2018 09:07) (73 - 101)  RR: 16 (14 Apr 2018 09:07) (15 - 18)  SpO2: 96% (14 Apr 2018 09:07) (96% - 98%)  I&O's Detail    13 Apr 2018 07:01  -  14 Apr 2018 07:00  --------------------------------------------------------  IN:    Oral Fluid: 1000 mL  Total IN: 1000 mL    OUT:    Voided: 2050 mL  Total OUT: 2050 mL    Total NET: -1050 mL      14 Apr 2018 07:01  -  14 Apr 2018 12:36  --------------------------------------------------------  IN:    Oral Fluid: 120 mL  Total IN: 120 mL    OUT:    Voided: 250 mL  Total OUT: 250 mL    Total NET: -130 mL      PHYSICAL EXAM:    General: NAD    Neurological: grossly intact    Cardiovascular: control rate     Respiratory: at room air     Gastrointestinal: bm and bs aer intact    Extremities: positive for edema    Vascular: pulses are intact    Incision Sites: examined    LABS:                        10.7   6.4   )-----------( 151      ( 13 Apr 2018 05:51 )             34.0    PT/INR - ( 13 Apr 2018 05:51 )   PT: 13.7 sec;   INR: 1.23          PTT - ( 13 Apr 2018 05:51 )  PTT:35.7 sec    04-13    138  |  99  |  31<H>  ----------------------------<  132<H>  3.6   |  31  |  1.05    Ca    9.4      13 Apr 2018 05:51  Mg     2.3     04-13        MEDICATIONS  (STANDING):  losartan 25 mg oral tablet  -- 1 tab(s) by mouth once a day  Flomax 0.4 mg oral capsule  -- 1 cap(s) by mouth once a day  Eliquis 2.5 mg oral tablet  -- 1 tab(s) by mouth 2 times a day   Lipitor 20 mg oral tablet  -- 1 tab(s) by mouth once a day  clopidogrel 75 mg oral tablet  -- 1 tab(s) by mouth once a day  metoprolol succinate 25 mg oral tablet, extended release  -- 1 tab(s) by mouth once a day   Lasix 40 mg oral tablet  -- 1 tab(s) by mouth once a day  Protonix 40 mg oral delayed release tablet  -- 1 tab(s) by mouth once a day      Discharge CXR:  4/13  A portable frontal view of the chest is compared to the prior study dated   4/12/2018. Heart size is again enlarged. Mildly improved pulmonary   congestion. Probable small effusions. No pneumothorax.  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 Paradoxical septal   motion   consistent with a conduction defect. The left ventricular ejection   fraction is   moderately reduced. The left ventricular ejection fraction is estimated   to be   30-35%  The right ventricle is normal in size and function.The left   atrium is   severely dilated. The left atrial volume index is 57 cc/m2 (normal   <34cc/m2)   Right atrial size is normal.Calcified aortic valve. There is mild aortic   regurgitation.There is Severe aortic stenosis. The peak pressure   gradient is   38 mmHg. The mean pressure gradient is 24 mmHg. The calculated aortic   valve   area using the continuity equation is 0.8 cm2. The calculated stroke   volume   index is 33 cc/m2 (normal >35cc/m2).  Tethered and thickened mitral valve   leaflets with normal opening. There is severe mitral regurgitation.  The   effective regurgitant orifice, calculated by the PISA method, is 0.43   cm2 The   regurgitant volume, based on the PISA calculation, is 86 ml.    Structurally   normal tricuspid valve. There is moderate tricuspid regurgitation.  The   pulmonic valve is not well visualized. No pulmonic regurgitation   noted.There   is no pericardial effusion. Left pleural effusion noted.Compared to the   TTE   performed on 4/5/2018, the transoartic gradients are smaller. Severe   mitral   regurgitation is better appreciated on today's study.

## 2018-04-20 DIAGNOSIS — I35.0 NONRHEUMATIC AORTIC (VALVE) STENOSIS: ICD-10-CM

## 2018-04-20 DIAGNOSIS — Z95.1 PRESENCE OF AORTOCORONARY BYPASS GRAFT: ICD-10-CM

## 2018-04-20 DIAGNOSIS — I25.2 OLD MYOCARDIAL INFARCTION: ICD-10-CM

## 2018-04-20 DIAGNOSIS — E78.5 HYPERLIPIDEMIA, UNSPECIFIED: ICD-10-CM

## 2018-04-20 DIAGNOSIS — I50.43 ACUTE ON CHRONIC COMBINED SYSTOLIC (CONGESTIVE) AND DIASTOLIC (CONGESTIVE) HEART FAILURE: ICD-10-CM

## 2018-04-20 DIAGNOSIS — K44.9 DIAPHRAGMATIC HERNIA WITHOUT OBSTRUCTION OR GANGRENE: ICD-10-CM

## 2018-04-20 DIAGNOSIS — N40.0 BENIGN PROSTATIC HYPERPLASIA WITHOUT LOWER URINARY TRACT SYMPTOMS: ICD-10-CM

## 2018-04-20 DIAGNOSIS — I48.2 CHRONIC ATRIAL FIBRILLATION: ICD-10-CM

## 2018-04-20 DIAGNOSIS — K21.9 GASTRO-ESOPHAGEAL REFLUX DISEASE WITHOUT ESOPHAGITIS: ICD-10-CM

## 2018-04-20 DIAGNOSIS — I25.10 ATHEROSCLEROTIC HEART DISEASE OF NATIVE CORONARY ARTERY WITHOUT ANGINA PECTORIS: ICD-10-CM

## 2018-04-20 DIAGNOSIS — I25.5 ISCHEMIC CARDIOMYOPATHY: ICD-10-CM

## 2018-04-20 DIAGNOSIS — I08.0 RHEUMATIC DISORDERS OF BOTH MITRAL AND AORTIC VALVES: ICD-10-CM

## 2018-04-20 DIAGNOSIS — I45.10 UNSPECIFIED RIGHT BUNDLE-BRANCH BLOCK: ICD-10-CM

## 2018-04-23 ENCOUNTER — APPOINTMENT (OUTPATIENT)
Dept: CARDIOTHORACIC SURGERY | Facility: CLINIC | Age: 83
End: 2018-04-23
Payer: MEDICARE

## 2018-04-23 ENCOUNTER — OUTPATIENT (OUTPATIENT)
Dept: OUTPATIENT SERVICES | Facility: HOSPITAL | Age: 83
LOS: 1 days | End: 2018-04-23
Payer: MEDICARE

## 2018-04-23 ENCOUNTER — APPOINTMENT (OUTPATIENT)
Dept: HEART AND VASCULAR | Facility: CLINIC | Age: 83
End: 2018-04-23
Payer: MEDICARE

## 2018-04-23 VITALS
OXYGEN SATURATION: 98 % | BODY MASS INDEX: 25.26 KG/M2 | SYSTOLIC BLOOD PRESSURE: 118 MMHG | HEART RATE: 64 BPM | HEIGHT: 62.99 IN | DIASTOLIC BLOOD PRESSURE: 50 MMHG | TEMPERATURE: 98.2 F | WEIGHT: 142.55 LBS

## 2018-04-23 VITALS
DIASTOLIC BLOOD PRESSURE: 54 MMHG | OXYGEN SATURATION: 97 % | TEMPERATURE: 97.5 F | RESPIRATION RATE: 16 BRPM | HEART RATE: 41 BPM | SYSTOLIC BLOOD PRESSURE: 110 MMHG

## 2018-04-23 DIAGNOSIS — Z95.1 PRESENCE OF AORTOCORONARY BYPASS GRAFT: Chronic | ICD-10-CM

## 2018-04-23 DIAGNOSIS — I35.0 NONRHEUMATIC AORTIC (VALVE) STENOSIS: ICD-10-CM

## 2018-04-23 DIAGNOSIS — Z87.438 PERSONAL HISTORY OF OTHER DISEASES OF MALE GENITAL ORGANS: ICD-10-CM

## 2018-04-23 DIAGNOSIS — I25.10 ATHEROSCLEROTIC HEART DISEASE OF NATIVE CORONARY ARTERY W/OUT ANGINA PECTORIS: ICD-10-CM

## 2018-04-23 DIAGNOSIS — I50.42 CHRONIC COMBINED SYSTOLIC (CONGESTIVE) AND DIASTOLIC (CONGESTIVE) HEART FAILURE: ICD-10-CM

## 2018-04-23 DIAGNOSIS — Z98.61 ATHEROSCLEROTIC HEART DISEASE OF NATIVE CORONARY ARTERY W/OUT ANGINA PECTORIS: ICD-10-CM

## 2018-04-23 PROBLEM — I48.91 UNSPECIFIED ATRIAL FIBRILLATION: Chronic | Status: ACTIVE | Noted: 2018-04-04

## 2018-04-23 PROBLEM — I21.9 ACUTE MYOCARDIAL INFARCTION, UNSPECIFIED: Chronic | Status: ACTIVE | Noted: 2018-04-04

## 2018-04-23 PROBLEM — I50.9 HEART FAILURE, UNSPECIFIED: Chronic | Status: ACTIVE | Noted: 2018-04-04

## 2018-04-23 LAB
ALBUMIN SERPL ELPH-MCNC: 3.8 G/DL — SIGNIFICANT CHANGE UP (ref 3.3–5)
ALP SERPL-CCNC: 92 U/L — SIGNIFICANT CHANGE UP (ref 40–120)
ALT FLD-CCNC: 29 U/L — SIGNIFICANT CHANGE UP (ref 10–45)
ANION GAP SERPL CALC-SCNC: 9 MMOL/L — SIGNIFICANT CHANGE UP (ref 5–17)
AST SERPL-CCNC: 34 U/L — SIGNIFICANT CHANGE UP (ref 10–40)
BASOPHILS NFR BLD AUTO: 0.3 % — SIGNIFICANT CHANGE UP (ref 0–2)
BILIRUB SERPL-MCNC: 0.6 MG/DL — SIGNIFICANT CHANGE UP (ref 0.2–1.2)
BUN SERPL-MCNC: 14 MG/DL — SIGNIFICANT CHANGE UP (ref 7–23)
CALCIUM SERPL-MCNC: 9.3 MG/DL — SIGNIFICANT CHANGE UP (ref 8.4–10.5)
CHLORIDE SERPL-SCNC: 97 MMOL/L — SIGNIFICANT CHANGE UP (ref 96–108)
CO2 SERPL-SCNC: 30 MMOL/L — SIGNIFICANT CHANGE UP (ref 22–31)
CREAT SERPL-MCNC: 0.95 MG/DL — SIGNIFICANT CHANGE UP (ref 0.5–1.3)
EOSINOPHIL NFR BLD AUTO: 1.3 % — SIGNIFICANT CHANGE UP (ref 0–6)
GLUCOSE SERPL-MCNC: 100 MG/DL — HIGH (ref 70–99)
HCT VFR BLD CALC: 32.7 % — LOW (ref 39–50)
HGB BLD-MCNC: 10.4 G/DL — LOW (ref 13–17)
LYMPHOCYTES # BLD AUTO: 21.2 % — SIGNIFICANT CHANGE UP (ref 13–44)
MCHC RBC-ENTMCNC: 31.8 G/DL — LOW (ref 32–36)
MCHC RBC-ENTMCNC: 31.8 PG — SIGNIFICANT CHANGE UP (ref 27–34)
MCV RBC AUTO: 100 FL — SIGNIFICANT CHANGE UP (ref 80–100)
MONOCYTES NFR BLD AUTO: 7.9 % — SIGNIFICANT CHANGE UP (ref 2–14)
NEUTROPHILS NFR BLD AUTO: 69.3 % — SIGNIFICANT CHANGE UP (ref 43–77)
NT-PROBNP SERPL-SCNC: 8548 PG/ML — HIGH (ref 0–300)
PLATELET # BLD AUTO: 306 K/UL — SIGNIFICANT CHANGE UP (ref 150–400)
POTASSIUM SERPL-MCNC: 3.7 MMOL/L — SIGNIFICANT CHANGE UP (ref 3.5–5.3)
POTASSIUM SERPL-SCNC: 3.7 MMOL/L — SIGNIFICANT CHANGE UP (ref 3.5–5.3)
PROT SERPL-MCNC: 7.2 G/DL — SIGNIFICANT CHANGE UP (ref 6–8.3)
RBC # BLD: 3.27 M/UL — LOW (ref 4.2–5.8)
RBC # FLD: 15 % — SIGNIFICANT CHANGE UP (ref 10.3–16.9)
SODIUM SERPL-SCNC: 136 MMOL/L — SIGNIFICANT CHANGE UP (ref 135–145)
WBC # BLD: 6.8 K/UL — SIGNIFICANT CHANGE UP (ref 3.8–10.5)
WBC # FLD AUTO: 6.8 K/UL — SIGNIFICANT CHANGE UP (ref 3.8–10.5)

## 2018-04-23 PROCEDURE — 83880 ASSAY OF NATRIURETIC PEPTIDE: CPT

## 2018-04-23 PROCEDURE — 36415 COLL VENOUS BLD VENIPUNCTURE: CPT

## 2018-04-23 PROCEDURE — 99024 POSTOP FOLLOW-UP VISIT: CPT

## 2018-04-23 PROCEDURE — 99215 OFFICE O/P EST HI 40 MIN: CPT | Mod: 25

## 2018-04-23 PROCEDURE — 85025 COMPLETE CBC W/AUTO DIFF WBC: CPT

## 2018-04-23 PROCEDURE — 80053 COMPREHEN METABOLIC PANEL: CPT

## 2018-04-23 PROCEDURE — 93000 ELECTROCARDIOGRAM COMPLETE: CPT

## 2018-04-24 DIAGNOSIS — I35.0 NONRHEUMATIC AORTIC (VALVE) STENOSIS: ICD-10-CM

## 2018-04-24 PROBLEM — I25.10 CAD S/P PERCUTANEOUS CORONARY ANGIOPLASTY: Status: ACTIVE | Noted: 2018-04-24

## 2018-04-24 PROBLEM — Z87.438 HISTORY OF BPH: Status: RESOLVED | Noted: 2018-04-24 | Resolved: 2018-04-24

## 2018-04-24 PROBLEM — I50.42 CHRONIC COMBINED SYSTOLIC AND DIASTOLIC HF (HEART FAILURE), NYHA CLASS 3: Status: ACTIVE | Noted: 2018-04-24

## 2018-04-24 RX ORDER — CLOPIDOGREL BISULFATE 75 MG/1
75 TABLET, FILM COATED ORAL DAILY
Qty: 30 | Refills: 1 | Status: ACTIVE | COMMUNITY

## 2018-04-24 RX ORDER — ATORVASTATIN CALCIUM 20 MG/1
20 TABLET, FILM COATED ORAL
Qty: 30 | Refills: 2 | Status: ACTIVE | COMMUNITY

## 2018-04-24 RX ORDER — TAMSULOSIN HYDROCHLORIDE 0.4 MG/1
0.4 CAPSULE ORAL
Qty: 30 | Refills: 2 | Status: ACTIVE | COMMUNITY

## 2018-04-30 ENCOUNTER — APPOINTMENT (OUTPATIENT)
Dept: HEART AND VASCULAR | Facility: CLINIC | Age: 83
End: 2018-04-30
Payer: MEDICARE

## 2018-04-30 PROCEDURE — 93306 TTE W/DOPPLER COMPLETE: CPT

## 2018-05-07 ENCOUNTER — APPOINTMENT (OUTPATIENT)
Dept: CARDIOTHORACIC SURGERY | Facility: CLINIC | Age: 83
End: 2018-05-07
Payer: MEDICARE

## 2018-05-07 ENCOUNTER — OUTPATIENT (OUTPATIENT)
Dept: OUTPATIENT SERVICES | Facility: HOSPITAL | Age: 83
LOS: 1 days | End: 2018-05-07
Payer: MEDICARE

## 2018-05-07 VITALS
HEIGHT: 62 IN | DIASTOLIC BLOOD PRESSURE: 58 MMHG | WEIGHT: 136 LBS | OXYGEN SATURATION: 99 % | RESPIRATION RATE: 16 BRPM | SYSTOLIC BLOOD PRESSURE: 123 MMHG | BODY MASS INDEX: 25.03 KG/M2 | HEART RATE: 45 BPM | TEMPERATURE: 97.6 F

## 2018-05-07 DIAGNOSIS — Z95.1 PRESENCE OF AORTOCORONARY BYPASS GRAFT: Chronic | ICD-10-CM

## 2018-05-07 LAB
ALBUMIN SERPL ELPH-MCNC: 3.9 G/DL — SIGNIFICANT CHANGE UP (ref 3.3–5)
ALP SERPL-CCNC: 85 U/L — SIGNIFICANT CHANGE UP (ref 40–120)
ALT FLD-CCNC: 15 U/L — SIGNIFICANT CHANGE UP (ref 10–45)
ANION GAP SERPL CALC-SCNC: 11 MMOL/L — SIGNIFICANT CHANGE UP (ref 5–17)
APTT BLD: 40.5 SEC — HIGH (ref 27.5–37.4)
AST SERPL-CCNC: 23 U/L — SIGNIFICANT CHANGE UP (ref 10–40)
BASOPHILS NFR BLD AUTO: 0.2 % — SIGNIFICANT CHANGE UP (ref 0–2)
BILIRUB SERPL-MCNC: 0.8 MG/DL — SIGNIFICANT CHANGE UP (ref 0.2–1.2)
BUN SERPL-MCNC: 16 MG/DL — SIGNIFICANT CHANGE UP (ref 7–23)
CALCIUM SERPL-MCNC: 9.6 MG/DL — SIGNIFICANT CHANGE UP (ref 8.4–10.5)
CHLORIDE SERPL-SCNC: 96 MMOL/L — SIGNIFICANT CHANGE UP (ref 96–108)
CO2 SERPL-SCNC: 27 MMOL/L — SIGNIFICANT CHANGE UP (ref 22–31)
CREAT SERPL-MCNC: 1.06 MG/DL — SIGNIFICANT CHANGE UP (ref 0.5–1.3)
EOSINOPHIL NFR BLD AUTO: 2.1 % — SIGNIFICANT CHANGE UP (ref 0–6)
GLUCOSE SERPL-MCNC: 101 MG/DL — HIGH (ref 70–99)
HCT VFR BLD CALC: 35.6 % — LOW (ref 39–50)
HGB BLD-MCNC: 11.2 G/DL — LOW (ref 13–17)
INR BLD: 1.38 — HIGH (ref 0.88–1.16)
LYMPHOCYTES # BLD AUTO: 32.4 % — SIGNIFICANT CHANGE UP (ref 13–44)
MCHC RBC-ENTMCNC: 31.5 G/DL — LOW (ref 32–36)
MCHC RBC-ENTMCNC: 32 PG — SIGNIFICANT CHANGE UP (ref 27–34)
MCV RBC AUTO: 101.7 FL — HIGH (ref 80–100)
MONOCYTES NFR BLD AUTO: 12.1 % — SIGNIFICANT CHANGE UP (ref 2–14)
NEUTROPHILS NFR BLD AUTO: 53.2 % — SIGNIFICANT CHANGE UP (ref 43–77)
NT-PROBNP SERPL-SCNC: 5607 PG/ML — HIGH (ref 0–300)
PLATELET # BLD AUTO: 171 K/UL — SIGNIFICANT CHANGE UP (ref 150–400)
POTASSIUM SERPL-MCNC: 4.4 MMOL/L — SIGNIFICANT CHANGE UP (ref 3.5–5.3)
POTASSIUM SERPL-SCNC: 4.4 MMOL/L — SIGNIFICANT CHANGE UP (ref 3.5–5.3)
PROT SERPL-MCNC: 8 G/DL — SIGNIFICANT CHANGE UP (ref 6–8.3)
PROTHROM AB SERPL-ACNC: 15.4 SEC — HIGH (ref 9.8–12.7)
RBC # BLD: 3.5 M/UL — LOW (ref 4.2–5.8)
RBC # FLD: 13.9 % — SIGNIFICANT CHANGE UP (ref 10.3–16.9)
SODIUM SERPL-SCNC: 134 MMOL/L — LOW (ref 135–145)
WBC # BLD: 4.4 K/UL — SIGNIFICANT CHANGE UP (ref 3.8–10.5)
WBC # FLD AUTO: 4.4 K/UL — SIGNIFICANT CHANGE UP (ref 3.8–10.5)

## 2018-05-07 PROCEDURE — 36415 COLL VENOUS BLD VENIPUNCTURE: CPT

## 2018-05-07 PROCEDURE — 99215 OFFICE O/P EST HI 40 MIN: CPT | Mod: 24

## 2018-05-08 DIAGNOSIS — I35.0 NONRHEUMATIC AORTIC (VALVE) STENOSIS: ICD-10-CM

## 2018-05-17 ENCOUNTER — APPOINTMENT (OUTPATIENT)
Dept: HEART AND VASCULAR | Facility: CLINIC | Age: 83
End: 2018-05-17

## 2018-05-21 ENCOUNTER — APPOINTMENT (OUTPATIENT)
Dept: HEART AND VASCULAR | Facility: CLINIC | Age: 83
End: 2018-05-21

## 2018-05-21 VITALS
OXYGEN SATURATION: 99 % | TEMPERATURE: 97 F | WEIGHT: 130.07 LBS | HEART RATE: 53 BPM | SYSTOLIC BLOOD PRESSURE: 127 MMHG | RESPIRATION RATE: 14 BRPM | DIASTOLIC BLOOD PRESSURE: 56 MMHG | HEIGHT: 67 IN

## 2018-05-22 ENCOUNTER — APPOINTMENT (OUTPATIENT)
Dept: CARDIOTHORACIC SURGERY | Facility: HOSPITAL | Age: 83
End: 2018-05-22
Payer: MEDICARE

## 2018-05-22 ENCOUNTER — INPATIENT (INPATIENT)
Facility: HOSPITAL | Age: 83
LOS: 2 days | Discharge: HOME HEALTH | DRG: 267 | End: 2018-05-25
Attending: INTERNAL MEDICINE | Admitting: INTERNAL MEDICINE
Payer: MEDICARE

## 2018-05-22 DIAGNOSIS — Z53.09 PROCEDURE AND TREATMENT NOT CARRIED OUT BECAUSE OF OTHER CONTRAINDICATION: Chronic | ICD-10-CM

## 2018-05-22 DIAGNOSIS — Z95.1 PRESENCE OF AORTOCORONARY BYPASS GRAFT: Chronic | ICD-10-CM

## 2018-05-22 DIAGNOSIS — Z98.890 OTHER SPECIFIED POSTPROCEDURAL STATES: Chronic | ICD-10-CM

## 2018-05-22 DIAGNOSIS — I35.0 NONRHEUMATIC AORTIC (VALVE) STENOSIS: ICD-10-CM

## 2018-05-22 LAB
ALBUMIN SERPL ELPH-MCNC: 4.2 G/DL — SIGNIFICANT CHANGE UP (ref 3.3–5)
ALP SERPL-CCNC: 83 U/L — SIGNIFICANT CHANGE UP (ref 40–120)
ALT FLD-CCNC: 18 U/L — SIGNIFICANT CHANGE UP (ref 10–45)
ANION GAP SERPL CALC-SCNC: 12 MMOL/L — SIGNIFICANT CHANGE UP (ref 5–17)
ANION GAP SERPL CALC-SCNC: 13 MMOL/L — SIGNIFICANT CHANGE UP (ref 5–17)
ANION GAP SERPL CALC-SCNC: 14 MMOL/L — SIGNIFICANT CHANGE UP (ref 5–17)
APTT BLD: 35.8 SEC — SIGNIFICANT CHANGE UP (ref 27.5–37.4)
APTT BLD: 36.4 SEC — SIGNIFICANT CHANGE UP (ref 27.5–37.4)
APTT BLD: 38.7 SEC — HIGH (ref 27.5–37.4)
AST SERPL-CCNC: 27 U/L — SIGNIFICANT CHANGE UP (ref 10–40)
BILIRUB SERPL-MCNC: 0.5 MG/DL — SIGNIFICANT CHANGE UP (ref 0.2–1.2)
BUN SERPL-MCNC: 20 MG/DL — SIGNIFICANT CHANGE UP (ref 7–23)
BUN SERPL-MCNC: 21 MG/DL — SIGNIFICANT CHANGE UP (ref 7–23)
BUN SERPL-MCNC: 23 MG/DL — SIGNIFICANT CHANGE UP (ref 7–23)
CALCIUM SERPL-MCNC: 8.8 MG/DL — SIGNIFICANT CHANGE UP (ref 8.4–10.5)
CALCIUM SERPL-MCNC: 9.2 MG/DL — SIGNIFICANT CHANGE UP (ref 8.4–10.5)
CALCIUM SERPL-MCNC: 9.7 MG/DL — SIGNIFICANT CHANGE UP (ref 8.4–10.5)
CHLORIDE SERPL-SCNC: 100 MMOL/L — SIGNIFICANT CHANGE UP (ref 96–108)
CHLORIDE SERPL-SCNC: 100 MMOL/L — SIGNIFICANT CHANGE UP (ref 96–108)
CHLORIDE SERPL-SCNC: 101 MMOL/L — SIGNIFICANT CHANGE UP (ref 96–108)
CO2 SERPL-SCNC: 23 MMOL/L — SIGNIFICANT CHANGE UP (ref 22–31)
CO2 SERPL-SCNC: 23 MMOL/L — SIGNIFICANT CHANGE UP (ref 22–31)
CO2 SERPL-SCNC: 27 MMOL/L — SIGNIFICANT CHANGE UP (ref 22–31)
CREAT SERPL-MCNC: 0.81 MG/DL — SIGNIFICANT CHANGE UP (ref 0.5–1.3)
CREAT SERPL-MCNC: 0.85 MG/DL — SIGNIFICANT CHANGE UP (ref 0.5–1.3)
CREAT SERPL-MCNC: 1.16 MG/DL — SIGNIFICANT CHANGE UP (ref 0.5–1.3)
GAS PNL BLDA: SIGNIFICANT CHANGE UP
GLUCOSE SERPL-MCNC: 104 MG/DL — HIGH (ref 70–99)
GLUCOSE SERPL-MCNC: 106 MG/DL — HIGH (ref 70–99)
GLUCOSE SERPL-MCNC: 87 MG/DL — SIGNIFICANT CHANGE UP (ref 70–99)
HCT VFR BLD CALC: 28.7 % — LOW (ref 39–50)
HCT VFR BLD CALC: 33.1 % — LOW (ref 39–50)
HCT VFR BLD CALC: 34.4 % — LOW (ref 39–50)
HGB BLD-MCNC: 10.5 G/DL — LOW (ref 13–17)
HGB BLD-MCNC: 10.8 G/DL — LOW (ref 13–17)
HGB BLD-MCNC: 9.2 G/DL — LOW (ref 13–17)
INR BLD: 1.2 — HIGH (ref 0.88–1.16)
INR BLD: 1.26 — HIGH (ref 0.88–1.16)
INR BLD: 1.53 — HIGH (ref 0.88–1.16)
MAGNESIUM SERPL-MCNC: 1.6 MG/DL — SIGNIFICANT CHANGE UP (ref 1.6–2.6)
MAGNESIUM SERPL-MCNC: 1.8 MG/DL — SIGNIFICANT CHANGE UP (ref 1.6–2.6)
MCHC RBC-ENTMCNC: 31.4 G/DL — LOW (ref 32–36)
MCHC RBC-ENTMCNC: 31.7 G/DL — LOW (ref 32–36)
MCHC RBC-ENTMCNC: 31.9 PG — SIGNIFICANT CHANGE UP (ref 27–34)
MCHC RBC-ENTMCNC: 31.9 PG — SIGNIFICANT CHANGE UP (ref 27–34)
MCHC RBC-ENTMCNC: 32.1 G/DL — SIGNIFICANT CHANGE UP (ref 32–36)
MCHC RBC-ENTMCNC: 32.4 PG — SIGNIFICANT CHANGE UP (ref 27–34)
MCV RBC AUTO: 100.6 FL — HIGH (ref 80–100)
MCV RBC AUTO: 101.1 FL — HIGH (ref 80–100)
MCV RBC AUTO: 101.5 FL — HIGH (ref 80–100)
NT-PROBNP SERPL-SCNC: 5491 PG/ML — HIGH (ref 0–300)
PLATELET # BLD AUTO: 146 K/UL — LOW (ref 150–400)
PLATELET # BLD AUTO: 174 K/UL — SIGNIFICANT CHANGE UP (ref 150–400)
PLATELET # BLD AUTO: 206 K/UL — SIGNIFICANT CHANGE UP (ref 150–400)
POTASSIUM SERPL-MCNC: 3.5 MMOL/L — SIGNIFICANT CHANGE UP (ref 3.5–5.3)
POTASSIUM SERPL-MCNC: 3.6 MMOL/L — SIGNIFICANT CHANGE UP (ref 3.5–5.3)
POTASSIUM SERPL-MCNC: 4.2 MMOL/L — SIGNIFICANT CHANGE UP (ref 3.5–5.3)
POTASSIUM SERPL-SCNC: 3.5 MMOL/L — SIGNIFICANT CHANGE UP (ref 3.5–5.3)
POTASSIUM SERPL-SCNC: 3.6 MMOL/L — SIGNIFICANT CHANGE UP (ref 3.5–5.3)
POTASSIUM SERPL-SCNC: 4.2 MMOL/L — SIGNIFICANT CHANGE UP (ref 3.5–5.3)
PROT SERPL-MCNC: 8.3 G/DL — SIGNIFICANT CHANGE UP (ref 6–8.3)
PROTHROM AB SERPL-ACNC: 13.4 SEC — HIGH (ref 9.8–12.7)
PROTHROM AB SERPL-ACNC: 14.1 SEC — HIGH (ref 9.8–12.7)
PROTHROM AB SERPL-ACNC: 17.1 SEC — HIGH (ref 9.8–12.7)
RBC # BLD: 2.84 M/UL — LOW (ref 4.2–5.8)
RBC # BLD: 3.29 M/UL — LOW (ref 4.2–5.8)
RBC # BLD: 3.39 M/UL — LOW (ref 4.2–5.8)
RBC # FLD: 13.5 % — SIGNIFICANT CHANGE UP (ref 10.3–16.9)
RBC # FLD: 13.6 % — SIGNIFICANT CHANGE UP (ref 10.3–16.9)
RBC # FLD: 13.7 % — SIGNIFICANT CHANGE UP (ref 10.3–16.9)
SODIUM SERPL-SCNC: 136 MMOL/L — SIGNIFICANT CHANGE UP (ref 135–145)
SODIUM SERPL-SCNC: 137 MMOL/L — SIGNIFICANT CHANGE UP (ref 135–145)
SODIUM SERPL-SCNC: 140 MMOL/L — SIGNIFICANT CHANGE UP (ref 135–145)
TSH SERPL-MCNC: 4.08 UIU/ML — SIGNIFICANT CHANGE UP (ref 0.35–4.94)
WBC # BLD: 5.9 K/UL — SIGNIFICANT CHANGE UP (ref 3.8–10.5)
WBC # BLD: 6.1 K/UL — SIGNIFICANT CHANGE UP (ref 3.8–10.5)
WBC # BLD: 6.2 K/UL — SIGNIFICANT CHANGE UP (ref 3.8–10.5)
WBC # FLD AUTO: 5.9 K/UL — SIGNIFICANT CHANGE UP (ref 3.8–10.5)
WBC # FLD AUTO: 6.1 K/UL — SIGNIFICANT CHANGE UP (ref 3.8–10.5)
WBC # FLD AUTO: 6.2 K/UL — SIGNIFICANT CHANGE UP (ref 3.8–10.5)

## 2018-05-22 PROCEDURE — 33361 REPLACE AORTIC VALVE PERQ: CPT | Mod: 62,Q0

## 2018-05-22 PROCEDURE — 99291 CRITICAL CARE FIRST HOUR: CPT

## 2018-05-22 PROCEDURE — 86900 BLOOD TYPING SEROLOGIC ABO: CPT

## 2018-05-22 PROCEDURE — 93010 ELECTROCARDIOGRAM REPORT: CPT

## 2018-05-22 PROCEDURE — 99292 CRITICAL CARE ADDL 30 MIN: CPT

## 2018-05-22 PROCEDURE — 93306 TTE W/DOPPLER COMPLETE: CPT | Mod: 26

## 2018-05-22 PROCEDURE — 71045 X-RAY EXAM CHEST 1 VIEW: CPT | Mod: 26

## 2018-05-22 PROCEDURE — 86901 BLOOD TYPING SEROLOGIC RH(D): CPT

## 2018-05-22 PROCEDURE — 33361 REPLACE AORTIC VALVE PERQ: CPT | Mod: 62,Q0,79

## 2018-05-22 PROCEDURE — 86850 RBC ANTIBODY SCREEN: CPT

## 2018-05-22 RX ORDER — NITROGLYCERIN 6.5 MG
0.3 CAPSULE, EXTENDED RELEASE ORAL
Qty: 0 | Refills: 0 | Status: COMPLETED | OUTPATIENT
Start: 2018-05-22 | End: 2018-05-22

## 2018-05-22 RX ORDER — ATORVASTATIN CALCIUM 80 MG/1
20 TABLET, FILM COATED ORAL AT BEDTIME
Qty: 0 | Refills: 0 | Status: DISCONTINUED | OUTPATIENT
Start: 2018-05-22 | End: 2018-05-25

## 2018-05-22 RX ORDER — SODIUM CHLORIDE 9 MG/ML
1000 INJECTION INTRAMUSCULAR; INTRAVENOUS; SUBCUTANEOUS
Qty: 0 | Refills: 0 | Status: DISCONTINUED | OUTPATIENT
Start: 2018-05-22 | End: 2018-05-23

## 2018-05-22 RX ORDER — ALBUMIN HUMAN 25 %
250 VIAL (ML) INTRAVENOUS ONCE
Qty: 0 | Refills: 0 | Status: COMPLETED | OUTPATIENT
Start: 2018-05-22 | End: 2018-05-23

## 2018-05-22 RX ORDER — POTASSIUM CHLORIDE 20 MEQ
20 PACKET (EA) ORAL
Qty: 0 | Refills: 0 | Status: COMPLETED | OUTPATIENT
Start: 2018-05-22 | End: 2018-05-22

## 2018-05-22 RX ORDER — ASPIRIN/CALCIUM CARB/MAGNESIUM 324 MG
325 TABLET ORAL ONCE
Qty: 0 | Refills: 0 | Status: COMPLETED | OUTPATIENT
Start: 2018-05-22 | End: 2018-05-22

## 2018-05-22 RX ORDER — POLYETHYLENE GLYCOL 3350 17 G/17G
17 POWDER, FOR SOLUTION ORAL DAILY
Qty: 0 | Refills: 0 | Status: DISCONTINUED | OUTPATIENT
Start: 2018-05-22 | End: 2018-05-25

## 2018-05-22 RX ORDER — HEPARIN SODIUM 5000 [USP'U]/ML
5000 INJECTION INTRAVENOUS; SUBCUTANEOUS EVERY 8 HOURS
Qty: 0 | Refills: 0 | Status: DISCONTINUED | OUTPATIENT
Start: 2018-05-22 | End: 2018-05-25

## 2018-05-22 RX ORDER — PANTOPRAZOLE SODIUM 20 MG/1
40 TABLET, DELAYED RELEASE ORAL
Qty: 0 | Refills: 0 | Status: DISCONTINUED | OUTPATIENT
Start: 2018-05-22 | End: 2018-05-25

## 2018-05-22 RX ORDER — CEFAZOLIN SODIUM 1 G
2000 VIAL (EA) INJECTION EVERY 8 HOURS
Qty: 0 | Refills: 0 | Status: COMPLETED | OUTPATIENT
Start: 2018-05-22 | End: 2018-05-23

## 2018-05-22 RX ORDER — CHLORHEXIDINE GLUCONATE 213 G/1000ML
5 SOLUTION TOPICAL EVERY 4 HOURS
Qty: 0 | Refills: 0 | Status: DISCONTINUED | OUTPATIENT
Start: 2018-05-22 | End: 2018-05-23

## 2018-05-22 RX ORDER — CLOPIDOGREL BISULFATE 75 MG/1
75 TABLET, FILM COATED ORAL DAILY
Qty: 0 | Refills: 0 | Status: DISCONTINUED | OUTPATIENT
Start: 2018-05-23 | End: 2018-05-25

## 2018-05-22 RX ORDER — DOCUSATE SODIUM 100 MG
100 CAPSULE ORAL THREE TIMES A DAY
Qty: 0 | Refills: 0 | Status: DISCONTINUED | OUTPATIENT
Start: 2018-05-22 | End: 2018-05-25

## 2018-05-22 RX ORDER — SENNA PLUS 8.6 MG/1
2 TABLET ORAL AT BEDTIME
Qty: 0 | Refills: 0 | Status: DISCONTINUED | OUTPATIENT
Start: 2018-05-22 | End: 2018-05-25

## 2018-05-22 RX ORDER — TAMSULOSIN HYDROCHLORIDE 0.4 MG/1
0.4 CAPSULE ORAL AT BEDTIME
Qty: 0 | Refills: 0 | Status: DISCONTINUED | OUTPATIENT
Start: 2018-05-22 | End: 2018-05-25

## 2018-05-22 RX ORDER — CLOPIDOGREL BISULFATE 75 MG/1
75 TABLET, FILM COATED ORAL DAILY
Qty: 0 | Refills: 0 | Status: COMPLETED | OUTPATIENT
Start: 2018-05-22 | End: 2018-05-22

## 2018-05-22 RX ADMIN — ATORVASTATIN CALCIUM 20 MILLIGRAM(S): 80 TABLET, FILM COATED ORAL at 22:30

## 2018-05-22 RX ADMIN — Medication 325 MILLIGRAM(S): at 10:20

## 2018-05-22 RX ADMIN — Medication 100 MILLIEQUIVALENT(S): at 23:00

## 2018-05-22 RX ADMIN — CHLORHEXIDINE GLUCONATE 5 MILLILITER(S): 213 SOLUTION TOPICAL at 22:32

## 2018-05-22 RX ADMIN — TAMSULOSIN HYDROCHLORIDE 0.4 MILLIGRAM(S): 0.4 CAPSULE ORAL at 22:30

## 2018-05-22 RX ADMIN — CLOPIDOGREL BISULFATE 75 MILLIGRAM(S): 75 TABLET, FILM COATED ORAL at 10:55

## 2018-05-22 RX ADMIN — Medication 0.3 MILLIGRAM(S): at 10:15

## 2018-05-22 RX ADMIN — Medication 100 MILLIGRAM(S): at 22:31

## 2018-05-22 RX ADMIN — Medication 100 MILLIEQUIVALENT(S): at 19:09

## 2018-05-22 RX ADMIN — HEPARIN SODIUM 5000 UNIT(S): 5000 INJECTION INTRAVENOUS; SUBCUTANEOUS at 22:30

## 2018-05-22 RX ADMIN — SENNA PLUS 2 TABLET(S): 8.6 TABLET ORAL at 22:30

## 2018-05-22 NOTE — H&P ADULT - NSHPREVIEWOFSYSTEMS_GEN_ALL_CORE
Review of Systems  CONSTITUTIONAL:  Denies Fevers / chills, sweats, fatigue, weight loss, weight gain                                      NEURO:  Denies parethesias, seizures, syncope, confusion                                                                                EYES:  Denies Blurry vision, discharge, pain, loss of vision                                                                                    ENMT:  Denies Difficulty hearing, vertigo, dysphagia, epistaxis, recent dental work                                       CV:  Denies Chest pain, palpitations, MCCARTHY, orthopnea                                                                                          RESPIRATORY:  +MCCARTHY.  +LE edema.  Denies Wheezing, cough / sputum, hemoptysis                                                                GI:  Denies Nausea, vomiting, diarrhea, constipation, melena, difficulty swallowing                                               : Denies Hematuria, dysuria, urgency, incontinence                                                                                         MUSCULOSKELETAL:  Denies arthritis, joint swelling, muscle weakness                                                             SKIN/BREAST:  Denies rash, itching, hair loss, masses                                                                                            PSYCH:  Denies depression, anxiety, suicidal ideation                                                                                               HEME/LYMPH:  Denies bruises easily, enlarged lymph nodes, tender lymph nodes                                        ENDOCRINE:  Denies cold intolerance, heat intolerance, polydipsia

## 2018-05-22 NOTE — H&P ADULT - NSHPLABSRESULTS_GEN_ALL_CORE
Admission labs pending.  All pre-TAVR work up has been done and reviewed.  Refer to [place] and paper chart.

## 2018-05-22 NOTE — PROGRESS NOTE ADULT - SUBJECTIVE AND OBJECTIVE BOX
CTICU  CRITICAL  CARE  attending     Hand off received 					   Pertinent clinical, laboratory, radiographic, hemodynamic, echocardiographic, respiratory data, microbiologic data and chart were reviewed and analyzed frequently throughout the course of the day and night  Patient seen and examined with CTS/ SH attending at bedside  Pt is a 89y , Male, HEALTH ISSUES - PROBLEM Dx:  Aortic stenosis: Aortic stenosis      , FAMILY HISTORY:  No pertinent family history in first degree relatives  PAST MEDICAL & SURGICAL HISTORY:  Aortic stenosis  Enlarged prostate  High cholesterol  GERD (gastroesophageal reflux disease)  MI (myocardial infarction)  CHF (congestive heart failure)  Afib  Contraindication to percutaneous coronary intervention (PCI): RCA  S/P balloon aortic valvuloplasty  S/P CABG (coronary artery bypass graft): [facility] [place]    Patient is a 89y old  Male who presents with a chief complaint of Aortic Stenosis (22 May 2018 09:06)      14 system review was unremarkable  acute changes include acute respiratory failure  Vital signs, hemodynamic and respiratory parameters were reviewed from the bedside nursing flowsheet.  ICU Vital Signs Last 24 Hrs  T(C): 36.8 (22 May 2018 21:21), Max: 36.8 (22 May 2018 21:21)  T(F): 98.2 (22 May 2018 21:21), Max: 98.2 (22 May 2018 21:21)  HR: 74 (22 May 2018 21:00) (62 - 74)  BP: 159/44 (22 May 2018 21:00) (116/78 - 165/72)  BP(mean): 85 (22 May 2018 21:00) (84 - 107)  ABP: 129/47 (22 May 2018 21:00) (100/36 - 132/36)  ABP(mean): 77 (22 May 2018 21:00) (50 - 77)  RR: 22 (22 May 2018 21:00) (14 - 29)  SpO2: 100% (22 May 2018 21:00) (99% - 100%)    Adult Advanced Hemodynamics Last 24 Hrs  CVP(mm Hg): --  CVP(cm H2O): --  CO: --  CI: --  PA: --  PA(mean): --  PCWP: --  SVR: --  SVRI: --  PVR: --  PVRI: --, ABG - ( 22 May 2018 15:10 )  pH, Arterial: 7.38  pH, Blood: x     /  pCO2: 43    /  pO2: 150   / HCO3: 25    / Base Excess: -0.6  /  SaO2: 99                  Intake and output was reviewed and the fluid balance was calculated  Daily     Daily   I&O's Summary    22 May 2018 07:01  -  22 May 2018 23:48  --------------------------------------------------------  IN: 100 mL / OUT: 750 mL / NET: -650 mL        All lines and drain sites were assessed  Glycemic trend was reviewedCAPILLARY BLOOD GLUCOSE        No acute change in mental status  Auscultation of the chest reveals equal bs  Abdomen is soft  Extremities are warm and well perfused  Wounds appear clean and unremarkable  Antibiotics are periop    labs  CBC Full  -  ( 22 May 2018 23:02 )  WBC Count : 6.2 K/uL  Hemoglobin : 10.5 g/dL  Hematocrit : 33.1 %  Platelet Count - Automated : 174 K/uL  Mean Cell Volume : 100.6 fL  Mean Cell Hemoglobin : 31.9 pg  Mean Cell Hemoglobin Concentration : 31.7 g/dL  Auto Neutrophil # : x  Auto Lymphocyte # : x  Auto Monocyte # : x  Auto Eosinophil # : x  Auto Basophil # : x  Auto Neutrophil % : x  Auto Lymphocyte % : x  Auto Monocyte % : x  Auto Eosinophil % : x  Auto Basophil % : x    05-22    137  |  100  |  21  ----------------------------<  87  4.2   |  23  |  0.85    Ca    9.2      22 May 2018 23:03  Mg     1.6     05-22    TPro  8.3  /  Alb  4.2  /  TBili  0.5  /  DBili  x   /  AST  27  /  ALT  18  /  AlkPhos  83  05-22    PT/INR - ( 22 May 2018 23:02 )   PT: 14.1 sec;   INR: 1.26          PTT - ( 22 May 2018 23:02 )  PTT:36.4 sec  The current medications were reviewed   MEDICATIONS  (STANDING):  albumin human  5% IVPB 250 milliLiter(s) IV Intermittent once  atorvastatin 20 milliGRAM(s) Oral at bedtime  ceFAZolin   IVPB 2000 milliGRAM(s) IV Intermittent every 8 hours  chlorhexidine 0.12% Liquid 5 milliLiter(s) Swish and Spit every 4 hours  docusate sodium 100 milliGRAM(s) Oral three times a day  heparin  Injectable 5000 Unit(s) SubCutaneous every 8 hours  pantoprazole    Tablet 40 milliGRAM(s) Oral before breakfast  potassium chloride  20 mEq/100 mL IVPB 20 milliEquivalent(s) IV Intermittent every 1 hour  senna 2 Tablet(s) Oral at bedtime  sodium chloride 0.9%. 1000 milliLiter(s) (10 mL/Hr) IV Continuous <Continuous>  tamsulosin 0.4 milliGRAM(s) Oral at bedtime    MEDICATIONS  (PRN):  polyethylene glycol 3350 17 Gram(s) Oral daily PRN Constipation       PROBLEM LIST/ ASSESSMENT:  HEALTH ISSUES - PROBLEM Dx:  Aortic stenosis: Aortic stenosis      ,   Patient is a 89y old  Male who presents with a chief complaint of Aortic Stenosis (22 May 2018 09:06)     s/p   acute changes include acute respiratory failure    My plan includes :  close hemodynamic, ventilatory and drain monitoring and management per post op routine    Monitor for arrhythmias and monitor parameters for organ perfusion  monitor neurologic status  Head of the bed should remain elevated to 45 deg .   chest PT and IS will be encouraged  monitor adequacy of oxygenation and ventilation and attempt to wean oxygen  Nutritional goals will be met using po eventually , ensure adequate caloric intake and montior the same  Stress ulcer and VTE prophylaxis will be achieved    Glycemic control is satisfactory  Electrolytes have been repleted as necessary and wound care has been carried out. Pain control has been achieved.   agressive physical therapy and early mobility and ambulation goals will be met   The family was updated about the course and plan  CRITICAL CARE TIME SPENT in evaluation and management, reassessments, review and interpretation of labs and x-rays, ventilator and hemodynamic management, formulating a plan and coordinating care: ___30____ MIN.  Time does not include procedural time.  CTICU ATTENDING     					    Juan Alberto Liu MD CTICU  CRITICAL  CARE  attending     Hand off received 					   Pertinent clinical, laboratory, radiographic, hemodynamic, echocardiographic, respiratory data, microbiologic data and chart were reviewed and analyzed frequently throughout the course of the day and night  Patient seen and examined with CTS/ SH attending at bedside  Pt is a 89y , Male, HEALTH ISSUES - PROBLEM Dx:  Aortic stenosis: Aortic stenosis  88 y/o male wtih PMHx HTN, HLD, BPH, chronic afib (on Eliquis), CAD s/o CABG (LIMA-LAD patent, SVG-OM1 and OM2 occluded and SVG-RCA atretic) 19 yrs ago at [facility], known RBBB and chronic systolic and diastolic heart failure EF 30-35%, with severe aortic stenosis, recent NSTEMI and CHF with a QI to pRCA and BAV on 4/11/18 who presented to the office for follow up.  He has been feeling better since his BAV, no longer has SOB at rest.  SOmetimes has SOB/fatigue after walking 2 blocks.  Denies chest pain.  Denies orthopnea, PND, dizziness, LOC or palpitations.  LE edema is improving.  **States he has an inguinal hernia that needs repair.  He was here recently for surgery to fix it, but during that admission he was SOB, and the AS/CHF was discovered which is why it was not done yet.    Denies acute or current SOB, chest pain, palpitation, N/V/D, fever/chills, recent illness, or any other concerning symptoms.    FAMILY HISTORY:  No pertinent family history in first degree relatives  PAST MEDICAL & SURGICAL HISTORY:  Aortic stenosis  Enlarged prostate  High cholesterol  GERD (gastroesophageal reflux disease)  MI (myocardial infarction)  CHF (congestive heart failure)  Afib  Contraindication to percutaneous coronary intervention (PCI): RCA  S/P balloon aortic valvuloplasty  S/P CABG (coronary artery bypass graft): 19 years ago @ [facility]     Patient is a 89y old  Male who presents with a chief complaint of Aortic Stenosis (22 May 2018 09:06)      14 system review was unremarkable  acute changes include acute respiratory failure  Vital signs, hemodynamic and respiratory parameters were reviewed from the bedside nursing flow sheet.  ICU Vital Signs Last 24 Hrs  T(C): 36.8 (22 May 2018 21:21), Max: 36.8 (22 May 2018 21:21)  T(F): 98.2 (22 May 2018 21:21), Max: 98.2 (22 May 2018 21:21)  HR: 74 (22 May 2018 21:00) (62 - 74)  BP: 159/44 (22 May 2018 21:00) (116/78 - 165/72)  BP(mean): 85 (22 May 2018 21:00) (84 - 107)  ABP: 129/47 (22 May 2018 21:00) (100/36 - 132/36)  ABP(mean): 77 (22 May 2018 21:00) (50 - 77)  RR: 22 (22 May 2018 21:00) (14 - 29)  SpO2: 100% (22 May 2018 21:00) (99% - 100%)    Adult Advanced Hemodynamics Last 24 Hrs  CVP(mm Hg): --  CVP(cm H2O): --  CO: --  CI: --  PA: --  PA(mean): --  PCWP: --  SVR: --  SVRI: --  PVR: --  PVRI: --, ABG - ( 22 May 2018 15:10 )  pH, Arterial: 7.38  pH, Blood: x     /  pCO2: 43    /  pO2: 150   / HCO3: 25    / Base Excess: -0.6  /  SaO2: 99                  Intake and output was reviewed and the fluid balance was calculated  Daily     Daily   I&O's Summary    22 May 2018 07:01  -  22 May 2018 23:48  --------------------------------------------------------  IN: 100 mL / OUT: 750 mL / NET: -650 mL        All lines and drain sites were assessed  Glycemic trend was reviewed CAPILLARY BLOOD GLUCOSE        NEURO: No acute change in mental status.  CVS: S1, S2, No S3.  RESPIRATORY: Auscultation of the chest reveals equal breath sounds.  GI: Abdomen is soft. No tenderness. + Bowel sounds.  Extremities are warm and well perfused.  VASCULAR: + Distal pulses.  Wounds appear clean and unremarkable  Antibiotics are perioperative cefazolin.    labs  CBC Full  -  ( 22 May 2018 23:02 )  WBC Count : 6.2 K/uL  Hemoglobin : 10.5 g/dL  Hematocrit : 33.1 %  Platelet Count - Automated : 174 K/uL  Mean Cell Volume : 100.6 fL  Mean Cell Hemoglobin : 31.9 pg  Mean Cell Hemoglobin Concentration : 31.7 g/dL  Auto Neutrophil # : x  Auto Lymphocyte # : x  Auto Monocyte # : x  Auto Eosinophil # : x  Auto Basophil # : x  Auto Neutrophil % : x  Auto Lymphocyte % : x  Auto Monocyte % : x  Auto Eosinophil % : x  Auto Basophil % : x    05-22    137  |  100  |  21  ----------------------------<  87  4.2   |  23  |  0.85    Ca    9.2      22 May 2018 23:03  Mg     1.6     05-22    TPro  8.3  /  Alb  4.2  /  TBili  0.5  /  DBili  x   /  AST  27  /  ALT  18  /  AlkPhos  83  05-22    PT/INR - ( 22 May 2018 23:02 )   PT: 14.1 sec;   INR: 1.26          PTT - ( 22 May 2018 23:02 )  PTT:36.4 sec  The current medications were reviewed   MEDICATIONS  (STANDING):  albumin human  5% IVPB 250 milliLiter(s) IV Intermittent once  atorvastatin 20 milliGRAM(s) Oral at bedtime  ceFAZolin   IVPB 2000 milliGRAM(s) IV Intermittent every 8 hours  chlorhexidine 0.12% Liquid 5 milliLiter(s) Swish and Spit every 4 hours  docusate sodium 100 milliGRAM(s) Oral three times a day  heparin  Injectable 5000 Unit(s) SubCutaneous every 8 hours  pantoprazole    Tablet 40 milliGRAM(s) Oral before breakfast  potassium chloride  20 mEq/100 mL IVPB 20 milliEquivalent(s) IV Intermittent every 1 hour  senna 2 Tablet(s) Oral at bedtime  sodium chloride 0.9%. 1000 milliLiter(s) (10 mL/Hr) IV Continuous <Continuous>  tamsulosin 0.4 milliGRAM(s) Oral at bedtime    MEDICATIONS  (PRN):  polyethylene glycol 3350 17 Gram(s) Oral daily PRN Constipation       PROBLEM LIST/ ASSESSMENT:  HEALTH ISSUES - PROBLEM Dx:  Aortic stenosis: Aortic stenosis        Patient is a 89y old  Male who presents  Aortic Stenosis.  S/P TAVR.  Hemodynamically stable.  QRS: 160ms. QTc: 484 ms.  Good urine output.  Fair  oxygenation.          My plan includes :  Close hemodynamic, ventilatory and drain monitoring and management.  ECHO in AM.  Monitor for arrhythmias and monitor parameters for organ perfusion  Monitor neurologic status  Head of the bed should remain elevated to 45 deg .   Chest PT and IS will be encouraged  Monitor adequacy of oxygenation and ventilation and attempt to wean oxygen  Nutritional goals will be met using po eventually , ensure adequate caloric intake and monitor  the same  Stress ulcer and VTE prophylaxis will be achieved    Glycemic control is satisfactory  Electrolytes have been repleted as necessary and wound care has been carried out. Pain control has been achieved.   Aggressive  physical therapy and early mobility and ambulation goals will be met   The family was updated about the course and plan  CRITICAL CARE TIME SPENT in evaluation and management, reassessments, review and interpretation of labs and x-rays, ventilator and hemodynamic management, formulating a plan and coordinating care: ___30____ MIN.  Time does not include procedural time.  CTICU ATTENDING     					    Juan Alberto Liu MD

## 2018-05-22 NOTE — H&P ADULT - PSH
Contraindication to percutaneous coronary intervention (PCI)  RCA  S/P balloon aortic valvuloplasty    S/P CABG (coronary artery bypass graft)  19 years ago @ [facility]

## 2018-05-22 NOTE — PROGRESS NOTE ADULT - SUBJECTIVE AND OBJECTIVE BOX
CTICU  CRITICAL  CARE  attending     Hand off received 					   Pertinent clinical, laboratory, radiographic, hemodynamic, echocardiographic, respiratory data, microbiologic data and chart were reviewed and analyzed frequently throughout the course of the day and night  Patient seen and examined with CTS/ SH attending at bedside  Pt is a 89y , Male, HEALTH ISSUES - PROBLEM Dx:  Aortic stenosis: Aortic stenosis      , FAMILY HISTORY:  No pertinent family history in first degree relatives  PAST MEDICAL & SURGICAL HISTORY:  Aortic stenosis  Enlarged prostate  High cholesterol  GERD (gastroesophageal reflux disease)  MI (myocardial infarction)  CHF (congestive heart failure)  Afib  Contraindication to percutaneous coronary intervention (PCI): RCA  S/P balloon aortic valvuloplasty  S/P CABG (coronary artery bypass graft): [facility]    Patient is a 89y old  Male who presents with a chief complaint of Aortic Stenosis (22 May 2018 09:06)      14 system review was unremarkable  acute changes include acute respiratory failure  Vital signs, hemodynamic and respiratory parameters were reviewed from the bedside nursing flowsheet.  ICU Vital Signs Last 24 Hrs  T(C): 36.3 (22 May 2018 17:21), Max: 36.7 (22 May 2018 16:00)  T(F): 97.3 (22 May 2018 17:21), Max: 98.1 (22 May 2018 16:00)  HR: 68 (22 May 2018 19:00) (62 - 74)  BP: 130/55 (22 May 2018 19:00) (116/78 - 165/72)  BP(mean): 102 (22 May 2018 19:00) (84 - 107)  ABP: 126/38 (22 May 2018 19:00) (100/36 - 126/38)  ABP(mean): 70 (22 May 2018 19:00) (50 - 70)  RR: 20 (22 May 2018 19:00) (14 - 29)  SpO2: 100% (22 May 2018 19:00) (99% - 100%)    Adult Advanced Hemodynamics Last 24 Hrs  CVP(mm Hg): --  CVP(cm H2O): --  CO: --  CI: --  PA: --  PA(mean): --  PCWP: --  SVR: --  SVRI: --  PVR: --  PVRI: --, ABG - ( 22 May 2018 15:10 )  pH, Arterial: 7.38  pH, Blood: x     /  pCO2: 43    /  pO2: 150   / HCO3: 25    / Base Excess: -0.6  /  SaO2: 99                  Intake and output was reviewed and the fluid balance was calculated  Daily     Daily   I&O's Summary      All lines and drain sites were assessed  Glycemic trend was reviewedCAPILLARY BLOOD GLUCOSE        No acute change in mental status  Auscultation of the chest reveals equal bs  Abdomen is soft  Extremities are warm and well perfused  Wounds appear clean and unremarkable  Antibiotics are periop    labs  CBC Full  -  ( 22 May 2018 15:05 )  WBC Count : 5.9 K/uL  Hemoglobin : 9.2 g/dL  Hematocrit : 28.7 %  Platelet Count - Automated : 146 K/uL  Mean Cell Volume : 101.1 fL  Mean Cell Hemoglobin : 32.4 pg  Mean Cell Hemoglobin Concentration : 32.1 g/dL  Auto Neutrophil # : x  Auto Lymphocyte # : x  Auto Monocyte # : x  Auto Eosinophil # : x  Auto Basophil # : x  Auto Neutrophil % : x  Auto Lymphocyte % : x  Auto Monocyte % : x  Auto Eosinophil % : x  Auto Basophil % : x    05-22    136  |  101  |  20  ----------------------------<  104<H>  3.6   |  23  |  0.81    Ca    8.8      22 May 2018 15:05  Mg     1.8     05-22    TPro  8.3  /  Alb  4.2  /  TBili  0.5  /  DBili  x   /  AST  27  /  ALT  18  /  AlkPhos  83  05-22    PT/INR - ( 22 May 2018 15:05 )   PT: 17.1 sec;   INR: 1.53          PTT - ( 22 May 2018 15:05 )  PTT:38.7 sec  The current medications were reviewed   MEDICATIONS  (STANDING):  atorvastatin 20 milliGRAM(s) Oral at bedtime  ceFAZolin   IVPB 2000 milliGRAM(s) IV Intermittent every 8 hours  chlorhexidine 0.12% Liquid 5 milliLiter(s) Swish and Spit every 4 hours  docusate sodium 100 milliGRAM(s) Oral three times a day  heparin  Injectable 5000 Unit(s) SubCutaneous every 8 hours  pantoprazole    Tablet 40 milliGRAM(s) Oral before breakfast  potassium chloride  20 mEq/100 mL IVPB 20 milliEquivalent(s) IV Intermittent every 1 hour  senna 2 Tablet(s) Oral at bedtime  sodium chloride 0.9%. 1000 milliLiter(s) (10 mL/Hr) IV Continuous <Continuous>  tamsulosin 0.4 milliGRAM(s) Oral at bedtime    MEDICATIONS  (PRN):  polyethylene glycol 3350 17 Gram(s) Oral daily PRN Constipation       PROBLEM LIST/ ASSESSMENT:  HEALTH ISSUES - PROBLEM Dx:  Aortic stenosis: Aortic stenosis      ,   Patient is a 89y old  Male who presents with a chief complaint of Aortic Stenosis (22 May 2018 09:06)     s/p tavr      My plan includes :  close hemodynamic, ventilatory and drain monitoring and management per post op routine    Monitor for arrhythmias and monitor parameters for organ perfusion  monitor neurologic status  Head of the bed should remain elevated to 45 deg .   chest PT and IS will be encouraged  monitor adequacy of oxygenation and ventilation and attempt to wean oxygen  Nutritional goals will be met using po eventually , ensure adequate caloric intake and montior the same  Stress ulcer and VTE prophylaxis will be achieved    Glycemic control is satisfactory  Electrolytes have been repleted as necessary and wound care has been carried out. Pain control has been achieved.   agressive physical therapy and early mobility and ambulation goals will be met   The family was updated about the course and plan  CRITICAL CARE TIME SPENT in evaluation and management, reassessments, review and interpretation of labs and x-rays, ventilator and hemodynamic management, formulating a plan and coordinating care: ___90____ MIN.  Time does not include procedural time.  CTICU ATTENDING     					    Blayne Najera MD

## 2018-05-22 NOTE — H&P ADULT - NSHPPHYSICALEXAM_GEN_ALL_CORE
GEN: NAD  Neuro: A&Ox3.  No focal deficits.  Moving all extremities.   HEENT: No obvious abnormalities  CV: S1S2, regular, +systolic murmur heard best at RSB.  No carotid bruits.  No JVD  Lungs: Clear B/L.  No wheezing, rales or rhonchi  ABD: Soft, non-tender, non-distended.  +Bowel sounds  EXT: Warm and well perfused.  +pitting edema B/L LE.    PV: Pedal pulses palpable GEN: NAD  Neuro: A&Ox3.  No focal deficits.  Moving all extremities.   HEENT: No obvious abnormalities  CV: S1S2, regular, +systolic murmur heard best at RSB.  No carotid bruits.  No JVD  Lungs: Clear B/L.  No wheezing, rales or rhonchi  ABD: Soft, non-tender, non-distended.  +Bowel sounds  EXT: Warm and well perfused.  No significant LE edema   PV: Pedal pulses palpable.

## 2018-05-22 NOTE — H&P ADULT - PROBLEM SELECTOR PLAN 1
Admit under Dr. Cyr via same day surgery for TAVR. Consent signed, placed on chart.  Risks/benefits reviewed, patient understands and agrees. T&S ordered and blood products placed on hold for OR.  To 9LA post-op, as mini-ICU care.

## 2018-05-22 NOTE — H&P ADULT - ASSESSMENT
88 y/o male wtih PMHx HTN, HLD, BPH, chronic afib (on Eliquis), CAD s/o CABG (LIMA-LAD patent, SVG-OM1 and OM2 occluded and SVG-RCA atretic) 19 yrs ago at [facility], known RBBB and chronic systolic and diastolic heart failure EF 30-35%, with severe aortic stenosis, recent NSTEMI and CHF with a QI to pRCA and BAV on 4/11/18 who presented to the office for follow up.  Deemed a candidate for TAVR and presents today electively for his procedure. 88 y/o male wtih PMHx HTN, HLD, BPH, chronic afib (on Eliquis), CAD s/o CABG (LIMA-LAD patent, SVG-OM1 and OM2 occluded and SVG-RCA atretic) 19 yrs ago at [facility], known RBBB and chronic systolic and diastolic heart failure EF 30-35%, with severe aortic stenosis, recent NSTEMI and CHF with a QI to pRCA and BAV on 4/11/18 who presented to the office for follow up.  Deemed high risk for redoSAVR, candidate for TAVR and presents today electively for his procedure.

## 2018-05-22 NOTE — H&P ADULT - PMH
Afib    Aortic stenosis    CHF (congestive heart failure)    Enlarged prostate    GERD (gastroesophageal reflux disease)    High cholesterol    MI (myocardial infarction)

## 2018-05-22 NOTE — H&P ADULT - HISTORY OF PRESENT ILLNESS
89 y/o male wtih PMHx HTN, HLD, BPH, chronic afib (on Eliquis), CAD s/o CABG (LIMA-LAD patent, SVG-OM1 and OM2 occluded and SVG-RCA atretic) 19 yrs ago at [facility], known RBBB and chronic systolic and diastolic heart failure EF 30-35%, with severe aortic stenosis, recent NSTEMI and CHF with a QI to pRCA and BAV on 4/11/18 who presented to the office for follow up.  He has been feeling better since his BAV, no longer has SOB at rest.  SOmetimes has SOB/fatigue after walking 2 blocks.  Denies chest pain.  Denies orthopnea, PND, dizziness, LOC or palpitations.  LE edema is improving.      Patient seen in same day holding area; Reports no changes to PMHx or medications since last seen by our team. Denies acute or current SOB, chest pain, palpitation, N/V/D, fever/chills, recent illness, or any other concerning symptoms. 89 y/o male wtih PMHx HTN, HLD, BPH, chronic afib (on Eliquis), CAD s/o CABG (LIMA-LAD patent, SVG-OM1 and OM2 occluded and SVG-RCA atretic) 19 yrs ago at [facility], known RBBB and chronic systolic and diastolic heart failure EF 30-35%, with severe aortic stenosis, recent NSTEMI and CHF with a QI to pRCA and BAV on 4/11/18 who presented to the office for follow up.  He has been feeling better since his BAV, no longer has SOB at rest.  SOmetimes has SOB/fatigue after walking 2 blocks.  Denies chest pain.  Denies orthopnea, PND, dizziness, LOC or palpitations.  LE edema is improving.  **States he has an inguinal hernia that needs repair.  He was here recently for surgery to fix it, but during that admission he was SOB, and the AS/CHF was discovered which is why it was not done yet.      Patient seen in same day holding area; Reports no changes to PMHx or medications since last seen by our team. Denies acute or current SOB, chest pain, palpitation, N/V/D, fever/chills, recent illness, or any other concerning symptoms.

## 2018-05-22 NOTE — CHART NOTE - NSCHARTNOTEFT_GEN_A_CORE
After discussing the consent with the patient (risks/benefits, etc) he started complaining of chest pain 8/10.  He said it came on suddenly, non-radiating, denies SOB.  States this sometimes happens at home and he usually takes his SL nitro which helps.  After discussing with Dr. Cyr we gave him a small dose of SL Nitro.  His pain then subsided.  Will proceed with TAVR.

## 2018-05-23 LAB
ANION GAP SERPL CALC-SCNC: 12 MMOL/L — SIGNIFICANT CHANGE UP (ref 5–17)
ANION GAP SERPL CALC-SCNC: 12 MMOL/L — SIGNIFICANT CHANGE UP (ref 5–17)
APTT BLD: 39.4 SEC — HIGH (ref 27.5–37.4)
APTT BLD: 41.4 SEC — HIGH (ref 27.5–37.4)
BASE EXCESS BLDA CALC-SCNC: -0.3 MMOL/L — SIGNIFICANT CHANGE UP (ref -2–3)
BASOPHILS NFR BLD AUTO: 0.2 % — SIGNIFICANT CHANGE UP (ref 0–2)
BUN SERPL-MCNC: 22 MG/DL — SIGNIFICANT CHANGE UP (ref 7–23)
BUN SERPL-MCNC: 23 MG/DL — SIGNIFICANT CHANGE UP (ref 7–23)
CALCIUM SERPL-MCNC: 8.6 MG/DL — SIGNIFICANT CHANGE UP (ref 8.4–10.5)
CALCIUM SERPL-MCNC: 9.1 MG/DL — SIGNIFICANT CHANGE UP (ref 8.4–10.5)
CHLORIDE SERPL-SCNC: 101 MMOL/L — SIGNIFICANT CHANGE UP (ref 96–108)
CHLORIDE SERPL-SCNC: 103 MMOL/L — SIGNIFICANT CHANGE UP (ref 96–108)
CO2 SERPL-SCNC: 23 MMOL/L — SIGNIFICANT CHANGE UP (ref 22–31)
CO2 SERPL-SCNC: 25 MMOL/L — SIGNIFICANT CHANGE UP (ref 22–31)
CREAT SERPL-MCNC: 0.9 MG/DL — SIGNIFICANT CHANGE UP (ref 0.5–1.3)
CREAT SERPL-MCNC: 0.94 MG/DL — SIGNIFICANT CHANGE UP (ref 0.5–1.3)
EOSINOPHIL NFR BLD AUTO: 0.4 % — SIGNIFICANT CHANGE UP (ref 0–6)
GAS PNL BLDA: SIGNIFICANT CHANGE UP
GLUCOSE BLDC GLUCOMTR-MCNC: 125 MG/DL — HIGH (ref 70–99)
GLUCOSE BLDC GLUCOMTR-MCNC: 128 MG/DL — HIGH (ref 70–99)
GLUCOSE SERPL-MCNC: 117 MG/DL — HIGH (ref 70–99)
GLUCOSE SERPL-MCNC: 139 MG/DL — HIGH (ref 70–99)
HCO3 BLDA-SCNC: 24 MMOL/L — SIGNIFICANT CHANGE UP (ref 21–28)
HCT VFR BLD CALC: 28.3 % — LOW (ref 39–50)
HCT VFR BLD CALC: 29.7 % — LOW (ref 39–50)
HGB BLD-MCNC: 9 G/DL — LOW (ref 13–17)
HGB BLD-MCNC: 9.8 G/DL — LOW (ref 13–17)
INR BLD: 1.33 — HIGH (ref 0.88–1.16)
INR BLD: 1.37 — HIGH (ref 0.88–1.16)
LYMPHOCYTES # BLD AUTO: 17.6 % — SIGNIFICANT CHANGE UP (ref 13–44)
MAGNESIUM SERPL-MCNC: 2.1 MG/DL — SIGNIFICANT CHANGE UP (ref 1.6–2.6)
MAGNESIUM SERPL-MCNC: 2.2 MG/DL — SIGNIFICANT CHANGE UP (ref 1.6–2.6)
MCHC RBC-ENTMCNC: 31.8 G/DL — LOW (ref 32–36)
MCHC RBC-ENTMCNC: 32.1 PG — SIGNIFICANT CHANGE UP (ref 27–34)
MCHC RBC-ENTMCNC: 32.3 PG — SIGNIFICANT CHANGE UP (ref 27–34)
MCHC RBC-ENTMCNC: 33 G/DL — SIGNIFICANT CHANGE UP (ref 32–36)
MCV RBC AUTO: 101.4 FL — HIGH (ref 80–100)
MCV RBC AUTO: 97.4 FL — SIGNIFICANT CHANGE UP (ref 80–100)
MONOCYTES NFR BLD AUTO: 10.5 % — SIGNIFICANT CHANGE UP (ref 2–14)
NEUTROPHILS NFR BLD AUTO: 71.3 % — SIGNIFICANT CHANGE UP (ref 43–77)
PCO2 BLDA: 36 MMHG — SIGNIFICANT CHANGE UP (ref 35–48)
PH BLDA: 7.43 — SIGNIFICANT CHANGE UP (ref 7.35–7.45)
PHOSPHATE SERPL-MCNC: 3.2 MG/DL — SIGNIFICANT CHANGE UP (ref 2.5–4.5)
PLATELET # BLD AUTO: 156 K/UL — SIGNIFICANT CHANGE UP (ref 150–400)
PLATELET # BLD AUTO: 164 K/UL — SIGNIFICANT CHANGE UP (ref 150–400)
PO2 BLDA: 123 MMHG — HIGH (ref 83–108)
POTASSIUM SERPL-MCNC: 3.4 MMOL/L — LOW (ref 3.5–5.3)
POTASSIUM SERPL-MCNC: 3.8 MMOL/L — SIGNIFICANT CHANGE UP (ref 3.5–5.3)
POTASSIUM SERPL-SCNC: 3.4 MMOL/L — LOW (ref 3.5–5.3)
POTASSIUM SERPL-SCNC: 3.8 MMOL/L — SIGNIFICANT CHANGE UP (ref 3.5–5.3)
PROTHROM AB SERPL-ACNC: 14.9 SEC — HIGH (ref 9.8–12.7)
PROTHROM AB SERPL-ACNC: 15.3 SEC — HIGH (ref 9.8–12.7)
RBC # BLD: 2.79 M/UL — LOW (ref 4.2–5.8)
RBC # BLD: 3.05 M/UL — LOW (ref 4.2–5.8)
RBC # FLD: 13.7 % — SIGNIFICANT CHANGE UP (ref 10.3–16.9)
RBC # FLD: 14 % — SIGNIFICANT CHANGE UP (ref 10.3–16.9)
SAO2 % BLDA: 98 % — SIGNIFICANT CHANGE UP (ref 95–100)
SODIUM SERPL-SCNC: 138 MMOL/L — SIGNIFICANT CHANGE UP (ref 135–145)
SODIUM SERPL-SCNC: 138 MMOL/L — SIGNIFICANT CHANGE UP (ref 135–145)
WBC # BLD: 4.8 K/UL — SIGNIFICANT CHANGE UP (ref 3.8–10.5)
WBC # BLD: 5.1 K/UL — SIGNIFICANT CHANGE UP (ref 3.8–10.5)
WBC # FLD AUTO: 4.8 K/UL — SIGNIFICANT CHANGE UP (ref 3.8–10.5)
WBC # FLD AUTO: 5.1 K/UL — SIGNIFICANT CHANGE UP (ref 3.8–10.5)

## 2018-05-23 PROCEDURE — 99232 SBSQ HOSP IP/OBS MODERATE 35: CPT | Mod: 24

## 2018-05-23 PROCEDURE — 93306 TTE W/DOPPLER COMPLETE: CPT | Mod: 26

## 2018-05-23 PROCEDURE — 99233 SBSQ HOSP IP/OBS HIGH 50: CPT

## 2018-05-23 PROCEDURE — 71045 X-RAY EXAM CHEST 1 VIEW: CPT | Mod: 26

## 2018-05-23 RX ORDER — POTASSIUM CHLORIDE 20 MEQ
40 PACKET (EA) ORAL ONCE
Qty: 0 | Refills: 0 | Status: COMPLETED | OUTPATIENT
Start: 2018-05-23 | End: 2018-05-23

## 2018-05-23 RX ORDER — APIXABAN 2.5 MG/1
2.5 TABLET, FILM COATED ORAL EVERY 12 HOURS
Qty: 0 | Refills: 0 | Status: DISCONTINUED | OUTPATIENT
Start: 2018-05-23 | End: 2018-05-23

## 2018-05-23 RX ORDER — SODIUM CHLORIDE 9 MG/ML
3 INJECTION INTRAMUSCULAR; INTRAVENOUS; SUBCUTANEOUS EVERY 8 HOURS
Qty: 0 | Refills: 0 | Status: DISCONTINUED | OUTPATIENT
Start: 2018-05-23 | End: 2018-05-25

## 2018-05-23 RX ORDER — POTASSIUM CHLORIDE 20 MEQ
10 PACKET (EA) ORAL EVERY 12 HOURS
Qty: 0 | Refills: 0 | Status: DISCONTINUED | OUTPATIENT
Start: 2018-05-23 | End: 2018-05-25

## 2018-05-23 RX ORDER — FENTANYL CITRATE 50 UG/ML
25 INJECTION INTRAVENOUS ONCE
Qty: 0 | Refills: 0 | Status: DISCONTINUED | OUTPATIENT
Start: 2018-05-23 | End: 2018-05-23

## 2018-05-23 RX ORDER — FUROSEMIDE 40 MG
20 TABLET ORAL EVERY 12 HOURS
Qty: 0 | Refills: 0 | Status: DISCONTINUED | OUTPATIENT
Start: 2018-05-23 | End: 2018-05-25

## 2018-05-23 RX ORDER — ACETAMINOPHEN 500 MG
1000 TABLET ORAL ONCE
Qty: 0 | Refills: 0 | Status: COMPLETED | OUTPATIENT
Start: 2018-05-23 | End: 2018-05-23

## 2018-05-23 RX ORDER — LOSARTAN POTASSIUM 100 MG/1
25 TABLET, FILM COATED ORAL DAILY
Qty: 0 | Refills: 0 | Status: DISCONTINUED | OUTPATIENT
Start: 2018-05-23 | End: 2018-05-24

## 2018-05-23 RX ORDER — MAGNESIUM SULFATE 500 MG/ML
2 VIAL (ML) INJECTION ONCE
Qty: 0 | Refills: 0 | Status: COMPLETED | OUTPATIENT
Start: 2018-05-23 | End: 2018-05-23

## 2018-05-23 RX ORDER — LIDOCAINE 4 G/100G
1 CREAM TOPICAL DAILY
Qty: 0 | Refills: 0 | Status: DISCONTINUED | OUTPATIENT
Start: 2018-05-23 | End: 2018-05-25

## 2018-05-23 RX ORDER — ALBUMIN HUMAN 25 %
250 VIAL (ML) INTRAVENOUS ONCE
Qty: 0 | Refills: 0 | Status: COMPLETED | OUTPATIENT
Start: 2018-05-23 | End: 2018-05-23

## 2018-05-23 RX ORDER — POTASSIUM CHLORIDE 20 MEQ
20 PACKET (EA) ORAL
Qty: 0 | Refills: 0 | Status: DISCONTINUED | OUTPATIENT
Start: 2018-05-23 | End: 2018-05-23

## 2018-05-23 RX ORDER — ACETAMINOPHEN 500 MG
650 TABLET ORAL EVERY 6 HOURS
Qty: 0 | Refills: 0 | Status: DISCONTINUED | OUTPATIENT
Start: 2018-05-23 | End: 2018-05-25

## 2018-05-23 RX ADMIN — Medication 50 GRAM(S): at 00:45

## 2018-05-23 RX ADMIN — Medication 100 MILLIGRAM(S): at 14:51

## 2018-05-23 RX ADMIN — HEPARIN SODIUM 5000 UNIT(S): 5000 INJECTION INTRAVENOUS; SUBCUTANEOUS at 07:05

## 2018-05-23 RX ADMIN — Medication 40 MILLIEQUIVALENT(S): at 14:52

## 2018-05-23 RX ADMIN — FENTANYL CITRATE 25 MICROGRAM(S): 50 INJECTION INTRAVENOUS at 22:59

## 2018-05-23 RX ADMIN — Medication 40 MILLIEQUIVALENT(S): at 09:46

## 2018-05-23 RX ADMIN — CLOPIDOGREL BISULFATE 75 MILLIGRAM(S): 75 TABLET, FILM COATED ORAL at 12:25

## 2018-05-23 RX ADMIN — HEPARIN SODIUM 5000 UNIT(S): 5000 INJECTION INTRAVENOUS; SUBCUTANEOUS at 21:57

## 2018-05-23 RX ADMIN — TAMSULOSIN HYDROCHLORIDE 0.4 MILLIGRAM(S): 0.4 CAPSULE ORAL at 21:47

## 2018-05-23 RX ADMIN — CHLORHEXIDINE GLUCONATE 5 MILLILITER(S): 213 SOLUTION TOPICAL at 07:02

## 2018-05-23 RX ADMIN — Medication 125 MILLILITER(S): at 01:30

## 2018-05-23 RX ADMIN — SODIUM CHLORIDE 3 MILLILITER(S): 9 INJECTION INTRAMUSCULAR; INTRAVENOUS; SUBCUTANEOUS at 21:51

## 2018-05-23 RX ADMIN — Medication 50 MILLIEQUIVALENT(S): at 07:02

## 2018-05-23 RX ADMIN — Medication 100 MILLIGRAM(S): at 14:52

## 2018-05-23 RX ADMIN — SENNA PLUS 2 TABLET(S): 8.6 TABLET ORAL at 21:48

## 2018-05-23 RX ADMIN — FENTANYL CITRATE 25 MICROGRAM(S): 50 INJECTION INTRAVENOUS at 23:30

## 2018-05-23 RX ADMIN — Medication 400 MILLIGRAM(S): at 19:59

## 2018-05-23 RX ADMIN — Medication 100 MILLIGRAM(S): at 21:48

## 2018-05-23 RX ADMIN — LOSARTAN POTASSIUM 25 MILLIGRAM(S): 100 TABLET, FILM COATED ORAL at 12:25

## 2018-05-23 RX ADMIN — Medication 100 MILLIGRAM(S): at 07:04

## 2018-05-23 RX ADMIN — SODIUM CHLORIDE 3 MILLILITER(S): 9 INJECTION INTRAMUSCULAR; INTRAVENOUS; SUBCUTANEOUS at 13:46

## 2018-05-23 RX ADMIN — Medication 650 MILLIGRAM(S): at 18:48

## 2018-05-23 RX ADMIN — Medication 20 MILLIGRAM(S): at 09:46

## 2018-05-23 RX ADMIN — Medication 10 MILLIEQUIVALENT(S): at 21:48

## 2018-05-23 RX ADMIN — ATORVASTATIN CALCIUM 20 MILLIGRAM(S): 80 TABLET, FILM COATED ORAL at 21:47

## 2018-05-23 RX ADMIN — Medication 20 MILLIGRAM(S): at 21:47

## 2018-05-23 RX ADMIN — Medication 1000 MILLIGRAM(S): at 20:30

## 2018-05-23 RX ADMIN — Medication 125 MILLILITER(S): at 01:00

## 2018-05-23 NOTE — PROGRESS NOTE ADULT - SUBJECTIVE AND OBJECTIVE BOX
CTICU  CRITICAL  CARE  attending     Hand off received 					   Pertinent clinical, laboratory, radiographic, hemodynamic, echocardiographic, respiratory data, microbiologic data and chart were reviewed and analyzed frequently throughout the course of the day and night  Patient seen and examined with CTS/ SH attending at bedside  Pt is a 89y , Male, HEALTH ISSUES - PROBLEM Dx:  Aortic stenosis: Aortic stenosis      , FAMILY HISTORY:  No pertinent family history in first degree relatives  PAST MEDICAL & SURGICAL HISTORY:  Aortic stenosis  Enlarged prostate  High cholesterol  GERD (gastroesophageal reflux disease)  MI (myocardial infarction)  CHF (congestive heart failure)  Afib  Contraindication to percutaneous coronary intervention (PCI): RCA  S/P balloon aortic valvuloplasty  S/P CABG (coronary artery bypass graft): [facility] years ago @ [facility]    Patient is a 89y old  Male who presents with a chief complaint of Aortic Stenosis (22 May 2018 09:06)      14 system review was unremarkable  acute changes include acute respiratory failure  Vital signs, hemodynamic and respiratory parameters were reviewed from the bedside nursing flowsheet.  ICU Vital Signs Last 24 Hrs  T(C): 36.4 (24 May 2018 11:01), Max: 37.2 (23 May 2018 17:30)  T(F): 97.6 (24 May 2018 11:01), Max: 99 (23 May 2018 17:30)  HR: 70 (24 May 2018 12:15) (56 - 70)  BP: 157/68 (24 May 2018 12:15) (116/42 - 159/68)  BP(mean): 98 (24 May 2018 12:15) (60 - 116)  ABP: 158/58 (23 May 2018 16:27) (158/58 - 158/58)  ABP(mean): 92 (23 May 2018 16:27) (92 - 92)  RR: 16 (24 May 2018 12:15) (12 - 25)  SpO2: 97% (24 May 2018 12:15) (88% - 99%)    Adult Advanced Hemodynamics Last 24 Hrs  CVP(mm Hg): --  CVP(cm H2O): --  CO: --  CI: --  PA: --  PA(mean): --  PCWP: --  SVR: --  SVRI: --  PVR: --  PVRI: --, ABG - ( 23 May 2018 04:06 )  pH, Arterial: 7.43  pH, Blood: x     /  pCO2: 36    /  pO2: 123   / HCO3: 24    / Base Excess: -0.3  /  SaO2: 98                  Intake and output was reviewed and the fluid balance was calculated  Daily     Daily   I&O's Summary    23 May 2018 07:01  -  24 May 2018 07:00  --------------------------------------------------------  IN: 960 mL / OUT: 3220 mL / NET: -2260 mL    24 May 2018 07:01  -  24 May 2018 13:48  --------------------------------------------------------  IN: 0 mL / OUT: 400 mL / NET: -400 mL        All lines and drain sites were assessed  Glycemic trend was reviewedCAPILLARY BLOOD GLUCOSE      POCT Blood Glucose.: 128 mg/dL (23 May 2018 21:40)    No acute change in mental status  Auscultation of the chest reveals equal bs  Abdomen is soft  Extremities are warm and well perfused  Wounds appear clean and unremarkable  Antibiotics are periop    labs  CBC Full  -  ( 24 May 2018 11:25 )  WBC Count : 5.2 K/uL  Hemoglobin : 10.2 g/dL  Hematocrit : 31.9 %  Platelet Count - Automated : 162 K/uL  Mean Cell Volume : 98.5 fL  Mean Cell Hemoglobin : 31.5 pg  Mean Cell Hemoglobin Concentration : 32.0 g/dL  Auto Neutrophil # : x  Auto Lymphocyte # : x  Auto Monocyte # : x  Auto Eosinophil # : x  Auto Basophil # : x  Auto Neutrophil % : x  Auto Lymphocyte % : x  Auto Monocyte % : x  Auto Eosinophil % : x  Auto Basophil % : x    05-24    139  |  101  |  23  ----------------------------<  88  3.8   |  27  |  0.89    Ca    9.3      24 May 2018 03:24  Phos  3.2     05-23  Mg     2.0     05-24      PT/INR - ( 24 May 2018 12:20 )   PT: 14.0 sec;   INR: 1.26          PTT - ( 24 May 2018 12:20 )  PTT:34.0 sec  The current medications were reviewed   MEDICATIONS  (STANDING):  atorvastatin 20 milliGRAM(s) Oral at bedtime  clopidogrel Tablet 75 milliGRAM(s) Oral daily  docusate sodium 100 milliGRAM(s) Oral three times a day  furosemide   Injectable 20 milliGRAM(s) IV Push every 12 hours  heparin  Injectable 5000 Unit(s) SubCutaneous every 8 hours  lidocaine   Patch 1 Patch Transdermal daily  pantoprazole    Tablet 40 milliGRAM(s) Oral before breakfast  potassium chloride    Tablet ER 10 milliEquivalent(s) Oral every 12 hours  senna 2 Tablet(s) Oral at bedtime  sodium chloride 0.9% lock flush 3 milliLiter(s) IV Push every 8 hours  tamsulosin 0.4 milliGRAM(s) Oral at bedtime    MEDICATIONS  (PRN):  acetaminophen   Tablet. 650 milliGRAM(s) Oral every 6 hours PRN Mild Pain (1 - 3)  polyethylene glycol 3350 17 Gram(s) Oral daily PRN Constipation       PROBLEM LIST/ ASSESSMENT:  HEALTH ISSUES - PROBLEM Dx:  Aortic stenosis: Aortic stenosis      ,   Patient is a 89y old  Male who presents with a chief complaint of Aortic Stenosis (22 May 2018 09:06)     s/p tavr      My plan includes :  close hemodynamic, ventilatory and drain monitoring and management per post op routine    Monitor for arrhythmias and monitor parameters for organ perfusion  monitor neurologic status  Head of the bed should remain elevated to 45 deg .   chest PT and IS will be encouraged  monitor adequacy of oxygenation and ventilation and attempt to wean oxygen  Nutritional goals will be met using po eventually , ensure adequate caloric intake and montior the same  Stress ulcer and VTE prophylaxis will be achieved    Glycemic control is satisfactory  Electrolytes have been repleted as necessary and wound care has been carried out. Pain control has been achieved.   agressive physical therapy and early mobility and ambulation goals will be met   The family was updated about the course and plan  CRITICAL CARE TIME SPENT in evaluation and management, reassessments, review and interpretation of labs and x-rays, ventilator and hemodynamic management, formulating a plan and coordinating care: ___90____ MIN.  Time does not include procedural time.  CTICU ATTENDING     					    Blayne Najera MD

## 2018-05-23 NOTE — PROGRESS NOTE ADULT - SUBJECTIVE AND OBJECTIVE BOX
SUBJECTIVE ASSESSMENT:   S/p TF-TAVR with a 29 Evolut Pro yesterday without complications.  Doing well overnight.  Denies chest pain, SOB.  No groin pain or hematoma. Neg I/O 1.0 L.  Tele: atrial fibrillation, pre-existing RBBB.     Vital Signs Last 24 Hrs  T(C): 36.4 (23 May 2018 05:00), Max: 36.8 (22 May 2018 21:21)  T(F): 97.5 (23 May 2018 05:00), Max: 98.2 (22 May 2018 21:21)  HR: 64 (23 May 2018 08:00) (62 - 78)  BP: 159/44 (22 May 2018 21:00) (116/78 - 165/72)  BP(mean): 85 (22 May 2018 21:00) (84 - 107)  RR: 30 (23 May 2018 08:00) (14 - 30)  SpO2: 96% (23 May 2018 08:00) (96% - 100%)  I&O's Detail    22 May 2018 07:01  -  23 May 2018 07:00  --------------------------------------------------------  IN:    IV PiggyBack: 250 mL    sodium chloride 0.9%.: 50 mL  Total IN: 300 mL    OUT:    Voided: 1350 mL  Total OUT: 1350 mL    Total NET: -1050 mL          PHYSICAL EXAM:    General: No acute distress.     HEENT: No JVD. No carotid bruits. No thyromegaly.    Cardiovascular: RRR, normal S1, S2. No murmur. No gallops. No rubs.     Respiratory: CTA bilaterally. No rhonchi. No wheezing. No crackles.    Gastrointestinal: Soft, not tender. Not distended. Normal bowel sounds. No hepatosplenomegaly.     Extremities: No pedal edema. Both groins: soft, no hematoma. Both femoral pulses 2/2+    Vascular: Normal peripheral pulses.     Neurological: A&Ox3. No focal deficit.     Skin: warm.         LABS:                        9.0    4.8   )-----------( 156      ( 23 May 2018 04:07 )             28.3       COUMADIN:  Yes/No. REASON: .    PT/INR - ( 23 May 2018 04:07 )   PT: 15.3 sec;   INR: 1.37          PTT - ( 23 May 2018 04:07 )  PTT:39.4 sec    05-23    138  |  103  |  23  ----------------------------<  117<H>  3.4<L>   |  23  |  0.94    Ca    8.6      23 May 2018 04:07  Phos  3.2     05-23  Mg     2.1     05-23    TPro  8.3  /  Alb  4.2  /  TBili  0.5  /  DBili  x   /  AST  27  /  ALT  18  /  AlkPhos  83  05-22          MEDICATIONS  (STANDING):  atorvastatin 20 milliGRAM(s) Oral at bedtime  ceFAZolin   IVPB 2000 milliGRAM(s) IV Intermittent every 8 hours  chlorhexidine 0.12% Liquid 5 milliLiter(s) Swish and Spit every 4 hours  clopidogrel Tablet 75 milliGRAM(s) Oral daily  docusate sodium 100 milliGRAM(s) Oral three times a day  heparin  Injectable 5000 Unit(s) SubCutaneous every 8 hours  pantoprazole    Tablet 40 milliGRAM(s) Oral before breakfast  potassium chloride  20 mEq/100 mL IVPB 20 milliEquivalent(s) IV Intermittent every 2 hours  senna 2 Tablet(s) Oral at bedtime  sodium chloride 0.9%. 1000 milliLiter(s) (10 mL/Hr) IV Continuous <Continuous>  tamsulosin 0.4 milliGRAM(s) Oral at bedtime    MEDICATIONS  (PRN):  polyethylene glycol 3350 17 Gram(s) Oral daily PRN Constipation        RADIOLOGY & ADDITIONAL TESTS:  CXR: cardiomegaly, mild pulmonary venous congestion SUBJECTIVE ASSESSMENT:   S/p TF-TAVR with a 29 Evolut Pro yesterday without complications.  Doing well overnight.  Denies chest pain, SOB.  No groin pain or hematoma. Neg I/O 1.0 L.  Tele: atrial fibrillation, pre-existing RBBB.     Vital Signs Last 24 Hrs  T(C): 36.4 (23 May 2018 05:00), Max: 36.8 (22 May 2018 21:21)  T(F): 97.5 (23 May 2018 05:00), Max: 98.2 (22 May 2018 21:21)  HR: 64 (23 May 2018 08:00) (62 - 78)  BP: 159/44 (22 May 2018 21:00) (116/78 - 165/72)  BP(mean): 85 (22 May 2018 21:00) (84 - 107)  RR: 30 (23 May 2018 08:00) (14 - 30)  SpO2: 96% (23 May 2018 08:00) (96% - 100%)  I&O's Detail    22 May 2018 07:01  -  23 May 2018 07:00  --------------------------------------------------------  IN:    IV PiggyBack: 250 mL    sodium chloride 0.9%.: 50 mL  Total IN: 300 mL    OUT:    Voided: 1350 mL  Total OUT: 1350 mL    Total NET: -1050 mL          PHYSICAL EXAM:    General: No acute distress.     HEENT: No JVD. No carotid bruits. No thyromegaly.    Cardiovascular: Irregularly irregular rhythm, normal S1, S2. No murmur. No gallops. No rubs.     Respiratory: CTA bilaterally. No rhonchi. No wheezing. No crackles.    Gastrointestinal: Soft, not tender. Not distended. Normal bowel sounds. No hepatosplenomegaly.     Extremities: No pedal edema. Both groins: soft, no hematoma. Both femoral pulses 2/2+    Vascular: Normal peripheral pulses.     Neurological: A&Ox3. No focal deficit.     Skin: warm.         LABS:                        9.0    4.8   )-----------( 156      ( 23 May 2018 04:07 )             28.3       COUMADIN:  Yes/No. REASON: .    PT/INR - ( 23 May 2018 04:07 )   PT: 15.3 sec;   INR: 1.37          PTT - ( 23 May 2018 04:07 )  PTT:39.4 sec    05-23    138  |  103  |  23  ----------------------------<  117<H>  3.4<L>   |  23  |  0.94    Ca    8.6      23 May 2018 04:07  Phos  3.2     05-23  Mg     2.1     05-23    TPro  8.3  /  Alb  4.2  /  TBili  0.5  /  DBili  x   /  AST  27  /  ALT  18  /  AlkPhos  83  05-22          MEDICATIONS  (STANDING):  atorvastatin 20 milliGRAM(s) Oral at bedtime  ceFAZolin   IVPB 2000 milliGRAM(s) IV Intermittent every 8 hours  chlorhexidine 0.12% Liquid 5 milliLiter(s) Swish and Spit every 4 hours  clopidogrel Tablet 75 milliGRAM(s) Oral daily  docusate sodium 100 milliGRAM(s) Oral three times a day  heparin  Injectable 5000 Unit(s) SubCutaneous every 8 hours  pantoprazole    Tablet 40 milliGRAM(s) Oral before breakfast  potassium chloride  20 mEq/100 mL IVPB 20 milliEquivalent(s) IV Intermittent every 2 hours  senna 2 Tablet(s) Oral at bedtime  sodium chloride 0.9%. 1000 milliLiter(s) (10 mL/Hr) IV Continuous <Continuous>  tamsulosin 0.4 milliGRAM(s) Oral at bedtime    MEDICATIONS  (PRN):  polyethylene glycol 3350 17 Gram(s) Oral daily PRN Constipation        RADIOLOGY & ADDITIONAL TESTS:  CXR: cardiomegaly, mild pulmonary venous congestion

## 2018-05-23 NOTE — PROGRESS NOTE ADULT - ASSESSMENT
88 y/o male wtih PMHx HTN, HLD, BPH, chronic afib (on Eliquis), CAD s/o CABG (LIMA-LAD patent, SVG-OM1 and OM2 occluded and SVG-RCA atretic) 19 yrs ago at [facility], known RBBB and chronic systolic and diastolic heart failure EF 30-35%, with severe aortic stenosis, recent NSTEMI and CHF with a QI to pRCA and BAV on 4/11/18     1) Severe AS s/p TAVR with a 29 mm Evolut Pro on 5/22/2018  - POD 1  - Doing well.  - Maintain TVP for now due to high risk of complete heart block (pre-existing RBBB, LAFB)  - No BB.  - Continue Plavix  - TTE today.  - D/C A line.    - Transfer to floor    2) Pre-existing RBBB and LAFB  - Maintain TVP.  - No BBB.    3) Chronic systolic heart failure, LVEF 35%  - Mild volume overloaded on CXR.  - Start Lasix 20 mg iv q 12 hrs.  - Resume Losartan 25 mg daily.    4) Chronic atrial fibrillation  - Rate is controlled.  - Resume Eliquis once echo is done and no evidence of pericardial effusion.     5) CAD s/p CABG, recent PCI of ostial RCA with QI on 4/11/2018  - Continue Plavix.  - Resume Eliquis if echo is ok.    6) HTN  - Elevated.  - Start IV lasix.  - Resume home Losartan.    7) HLD  - Continue Atorvastatin. 89 y/o male wtih PMHx HTN, HLD, BPH, chronic afib (on Eliquis), CAD s/o CABG (LIMA-LAD patent, SVG-OM1 and OM2 occluded and SVG-RCA atretic) 19 yrs ago at [facility], known RBBB and chronic systolic and diastolic heart failure EF 30-35%, with severe aortic stenosis, recent NSTEMI and CHF with a QI to pRCA and BAV on 4/11/18     1) Severe AS s/p TAVR with a 29 mm Evolut Pro on 5/22/2018  - POD 1  - Doing well.  - Maintain TVP for now due to high risk of complete heart block (pre-existing RBBB, LAFB)  - No BB.  - Continue Plavix  - TTE today.  - D/C A line.    - Transfer to floor    2) Pre-existing RBBB and LAFB  - Maintain TVP.  - No BB.    3) Chronic systolic heart failure, LVEF 35%  - Mild volume overloaded on CXR.  - Start Lasix 20 mg iv q 12 hrs.  - Resume Losartan 25 mg daily.    4) Chronic atrial fibrillation  - Rate is controlled.  - Resume Eliquis once echo is done and no evidence of pericardial effusion.     5) CAD s/p CABG, recent PCI of ostial RCA with QI on 4/11/2018  - Continue Plavix.  - Resume Eliquis if echo is ok.    6) HTN  - Elevated.  - Start IV lasix.  - Resume home Losartan.    7) HLD  - Continue Atorvastatin.

## 2018-05-23 NOTE — CONSULT NOTE ADULT - SUBJECTIVE AND OBJECTIVE BOX
CHIEF COMPLAINT: s/p TAVR, RBBB    HISTORY OF PRESENT ILLNESS: 89 yo M with history of HTN, HLD, BPH, chronic atrial fibrillation (on Eliquis), CAD s/o CABG (LIMA-LAD patent, SVG-OM1 and OM2 occluded and SVG-RCA atretic) 19 yrs ago at [facility], known RBBB and chronic systolic and diastolic heart failure EF 30-35%, with severe aortic stenosis, recent NSTEMI and CHF with a QI to pRCA and BAV on 4/11/18, underwent TAVR with Core valve on 5/22/18.       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 Paradoxical septal   motion   consistent with a conduction defect. The left ventricular ejection   fraction is   moderately reduced. The left ventricular ejection fraction is estimated   to be   30-35%  The right ventricle is normal in size and function.The left   atrium is   severely dilated. The left atrial volume index is 57 cc/m2 (normal   <34cc/m2)   Right atrial size is normal.Calcified aortic valve. There is mild aortic   regurgitation.There is Severe aortic stenosis. The peak pressure   gradient is   38 mmHg. The mean pressure gradient is 24 mmHg. The calculated aortic   valve   area using the continuity equation is 0.8 cm2. The calculated stroke   volume   index is 33 cc/m2 (normal >35cc/m2).  Tethered and thickened mitral valve   leaflets with normal opening. There is severe mitral regurgitation.  The   effective regurgitant orifice, calculated by the PISA method, is 0.43   cm2 The   regurgitant volume, based on the PISA calculation, is 86 ml.    Structurally   normal tricuspid valve. There is moderate tricuspid regurgitation.  The   pulmonic valve is not well visualized. No pulmonic regurgitation   noted.There   is no pericardial effusion. Left pleural effusion noted.Compared to the   TTE   performed on 4/5/2018, the transoartic gradients are smaller. Severe   mitral   regurgitation is better appreciated on today's study.        Allergies    No Known Allergies    Intolerances    	    MEDICATIONS:  furosemide   Injectable 20 milliGRAM(s) IV Push every 12 hours  losartan 25 milliGRAM(s) Oral daily  tamsulosin 0.4 milliGRAM(s) Oral at bedtime  ceFAZolin   IVPB 2000 milliGRAM(s) IV Intermittent every 8 hours  acetaminophen   Tablet. 650 milliGRAM(s) Oral every 6 hours PRN  docusate sodium 100 milliGRAM(s) Oral three times a day  pantoprazole    Tablet 40 milliGRAM(s) Oral before breakfast  polyethylene glycol 3350 17 Gram(s) Oral daily PRN  senna 2 Tablet(s) Oral at bedtime  atorvastatin 20 milliGRAM(s) Oral at bedtime  clopidogrel Tablet 75 milliGRAM(s) Oral daily  heparin  Injectable 5000 Unit(s) SubCutaneous every 8 hours  lidocaine   Patch 1 Patch Transdermal daily  potassium chloride    Tablet ER 10 milliEquivalent(s) Oral every 12 hours  sodium chloride 0.9% lock flush 3 milliLiter(s) IV Push every 8 hours      FAMILY HISTORY:  No pertinent family history in first degree relatives      SOCIAL HISTORY:    [x ] Non-smoker      REVIEW OF SYSTEMS:    CONSTITUTIONAL: No fever, weight loss, or fatigue  EYES: No eye pain, visual disturbances, or discharge  ENMT:  No difficulty hearing, tinnitus, vertigo; No sinus or throat pain  NECK: No pain or stiffness  RESPIRATORY: No cough, wheezing, chills or hemoptysis; + Shortness of Breath  CARDIOVASCULAR: No chest pain, palpitations, dizziness, or leg swelling  GASTROINTESTINAL: No abdominal or epigastric pain. No nausea, vomiting, or hematemesis; No diarrhea or constipation. No melena or hematochezia.  GENITOURINARY: No dysuria, frequency, hematuria, or incontinence  NEUROLOGICAL: No headaches, memory loss, loss of strength, numbness, or tremors  SKIN: No itching, burning, rashes, or lesions   LYMPH Nodes: No enlarged glands  ENDOCRINE: No heat or cold intolerance; No hair loss  MUSCULOSKELETAL: No joint pain or swelling; No muscle, back, or extremity pain  PSYCHIATRIC: No depression, anxiety, mood swings, or difficulty sleeping    PHYSICAL EXAM:  T(C): 36.2 (05-23-18 @ 09:00), Max: 36.8 (05-22-18 @ 21:21)  HR: 62 (05-23-18 @ 11:00) (62 - 78)  BP: 159/44 (05-22-18 @ 21:00) (116/78 - 159/44)  RR: 23 (05-23-18 @ 11:00) (14 - 30)  SpO2: 97% (05-23-18 @ 11:00) (96% - 100%)  Wt(kg): --  I&O's Summary    22 May 2018 07:01  -  23 May 2018 07:00  --------------------------------------------------------  IN: 920 mL / OUT: 1550 mL / NET: -630 mL    23 May 2018 07:01  -  23 May 2018 13:31  --------------------------------------------------------  IN: 10 mL / OUT: 750 mL / NET: -740 mL        TELEMETRY: atrial fibrillation, RBBB,     ECG: atrial fibrillation, RBBB,  LAFB    HEENT:   PERRL, EOMI	  Cardiovascular:  S1 S2, irregular, no  edema  Respiratory: Lungs clear to auscultation	  Gastrointestinal:  Soft, Non-tender, + BS	  Neurologic: A&O x 3  Extremities: no edema      LABS:	 	    CARDIAC MARKERS:                            9.8    5.1   )-----------( 164      ( 23 May 2018 13:07 )             29.7     05-23    138  |  103  |  23  ----------------------------<  117<H>  3.4<L>   |  23  |  0.94    Ca    8.6      23 May 2018 04:07  Phos  3.2     05-23  Mg     2.1     05-23    TPro  8.3  /  Alb  4.2  /  TBili  0.5  /  DBili  x   /  AST  27  /  ALT  18  /  AlkPhos  83  05-22    proBNP:   Lipid Profile:   HgA1c:   TSH:     ASSESSMENT/PLAN: 	  89 yo M with history of HTN, HLD, BPH, chronic atrial fibrillation (on Eliquis), CAD s/o CABG (LIMA-LAD patent, SVG-OM1 and OM2 occluded and SVG-RCA atretic) 19 yrs ago at [facility], known RBBB and chronic systolic and diastolic heart failure EF 30-35%, with severe aortic stenosis, recent NSTEMI and CHF with a QI to pRCA and BAV on 4/11/18, underwent TAVR with Core valve on 5/22/18.   Patients ECG is unchanged from pre procedure ECG, but given his RBBB would monitor on telemetry and keep TVP for 72 hrs post procedure, if there are no changes on ECG will arrange for out patient event monitor.

## 2018-05-24 LAB
ANION GAP SERPL CALC-SCNC: 11 MMOL/L — SIGNIFICANT CHANGE UP (ref 5–17)
APTT BLD: 34 SEC — SIGNIFICANT CHANGE UP (ref 27.5–37.4)
APTT BLD: 48.8 SEC — HIGH (ref 27.5–37.4)
APTT BLD: >200 SEC — CRITICAL HIGH (ref 27.5–37.4)
BASOPHILS NFR BLD AUTO: 0.2 % — SIGNIFICANT CHANGE UP (ref 0–2)
BUN SERPL-MCNC: 23 MG/DL — SIGNIFICANT CHANGE UP (ref 7–23)
CALCIUM SERPL-MCNC: 9.3 MG/DL — SIGNIFICANT CHANGE UP (ref 8.4–10.5)
CHLORIDE SERPL-SCNC: 101 MMOL/L — SIGNIFICANT CHANGE UP (ref 96–108)
CO2 SERPL-SCNC: 27 MMOL/L — SIGNIFICANT CHANGE UP (ref 22–31)
CREAT SERPL-MCNC: 0.89 MG/DL — SIGNIFICANT CHANGE UP (ref 0.5–1.3)
EOSINOPHIL NFR BLD AUTO: 2.5 % — SIGNIFICANT CHANGE UP (ref 0–6)
GLUCOSE SERPL-MCNC: 88 MG/DL — SIGNIFICANT CHANGE UP (ref 70–99)
HCT VFR BLD CALC: 28.2 % — LOW (ref 39–50)
HCT VFR BLD CALC: 31.9 % — LOW (ref 39–50)
HGB BLD-MCNC: 10.2 G/DL — LOW (ref 13–17)
HGB BLD-MCNC: 8.9 G/DL — LOW (ref 13–17)
INR BLD: 1.26 — HIGH (ref 0.88–1.16)
INR BLD: 1.29 — HIGH (ref 0.88–1.16)
INR BLD: >24 — CRITICAL HIGH (ref 0.88–1.16)
LYMPHOCYTES # BLD AUTO: 28.6 % — SIGNIFICANT CHANGE UP (ref 13–44)
MAGNESIUM SERPL-MCNC: 2 MG/DL — SIGNIFICANT CHANGE UP (ref 1.6–2.6)
MCHC RBC-ENTMCNC: 31.5 PG — SIGNIFICANT CHANGE UP (ref 27–34)
MCHC RBC-ENTMCNC: 31.6 G/DL — LOW (ref 32–36)
MCHC RBC-ENTMCNC: 31.8 PG — SIGNIFICANT CHANGE UP (ref 27–34)
MCHC RBC-ENTMCNC: 32 G/DL — SIGNIFICANT CHANGE UP (ref 32–36)
MCV RBC AUTO: 100.7 FL — HIGH (ref 80–100)
MCV RBC AUTO: 98.5 FL — SIGNIFICANT CHANGE UP (ref 80–100)
MONOCYTES NFR BLD AUTO: 14.5 % — HIGH (ref 2–14)
NEUTROPHILS NFR BLD AUTO: 54.2 % — SIGNIFICANT CHANGE UP (ref 43–77)
PLATELET # BLD AUTO: 140 K/UL — LOW (ref 150–400)
PLATELET # BLD AUTO: 162 K/UL — SIGNIFICANT CHANGE UP (ref 150–400)
POTASSIUM SERPL-MCNC: 3.8 MMOL/L — SIGNIFICANT CHANGE UP (ref 3.5–5.3)
POTASSIUM SERPL-SCNC: 3.8 MMOL/L — SIGNIFICANT CHANGE UP (ref 3.5–5.3)
PROTHROM AB SERPL-ACNC: 14 SEC — HIGH (ref 9.8–12.7)
PROTHROM AB SERPL-ACNC: 14.4 SEC — HIGH (ref 9.8–12.7)
PROTHROM AB SERPL-ACNC: >320 SEC — HIGH (ref 9.8–12.7)
RBC # BLD: 2.8 M/UL — LOW (ref 4.2–5.8)
RBC # BLD: 3.24 M/UL — LOW (ref 4.2–5.8)
RBC # FLD: 13.4 % — SIGNIFICANT CHANGE UP (ref 10.3–16.9)
RBC # FLD: 13.9 % — SIGNIFICANT CHANGE UP (ref 10.3–16.9)
SODIUM SERPL-SCNC: 139 MMOL/L — SIGNIFICANT CHANGE UP (ref 135–145)
WBC # BLD: 4.3 K/UL — SIGNIFICANT CHANGE UP (ref 3.8–10.5)
WBC # BLD: 5.2 K/UL — SIGNIFICANT CHANGE UP (ref 3.8–10.5)
WBC # FLD AUTO: 4.3 K/UL — SIGNIFICANT CHANGE UP (ref 3.8–10.5)
WBC # FLD AUTO: 5.2 K/UL — SIGNIFICANT CHANGE UP (ref 3.8–10.5)

## 2018-05-24 PROCEDURE — 71045 X-RAY EXAM CHEST 1 VIEW: CPT | Mod: 26

## 2018-05-24 PROCEDURE — 99232 SBSQ HOSP IP/OBS MODERATE 35: CPT | Mod: 24

## 2018-05-24 PROCEDURE — 93010 ELECTROCARDIOGRAM REPORT: CPT

## 2018-05-24 RX ORDER — LOSARTAN POTASSIUM 100 MG/1
50 TABLET, FILM COATED ORAL DAILY
Qty: 0 | Refills: 0 | Status: DISCONTINUED | OUTPATIENT
Start: 2018-05-25 | End: 2018-05-25

## 2018-05-24 RX ORDER — POTASSIUM CHLORIDE 20 MEQ
20 PACKET (EA) ORAL ONCE
Qty: 0 | Refills: 0 | Status: COMPLETED | OUTPATIENT
Start: 2018-05-24 | End: 2018-05-24

## 2018-05-24 RX ORDER — LOSARTAN POTASSIUM 100 MG/1
25 TABLET, FILM COATED ORAL ONCE
Qty: 0 | Refills: 0 | Status: COMPLETED | OUTPATIENT
Start: 2018-05-24 | End: 2018-05-24

## 2018-05-24 RX ADMIN — Medication 100 MILLIGRAM(S): at 06:21

## 2018-05-24 RX ADMIN — PANTOPRAZOLE SODIUM 40 MILLIGRAM(S): 20 TABLET, DELAYED RELEASE ORAL at 06:20

## 2018-05-24 RX ADMIN — Medication 100 MILLIGRAM(S): at 14:20

## 2018-05-24 RX ADMIN — TAMSULOSIN HYDROCHLORIDE 0.4 MILLIGRAM(S): 0.4 CAPSULE ORAL at 22:10

## 2018-05-24 RX ADMIN — SODIUM CHLORIDE 3 MILLILITER(S): 9 INJECTION INTRAMUSCULAR; INTRAVENOUS; SUBCUTANEOUS at 14:00

## 2018-05-24 RX ADMIN — CLOPIDOGREL BISULFATE 75 MILLIGRAM(S): 75 TABLET, FILM COATED ORAL at 10:24

## 2018-05-24 RX ADMIN — HEPARIN SODIUM 5000 UNIT(S): 5000 INJECTION INTRAVENOUS; SUBCUTANEOUS at 22:10

## 2018-05-24 RX ADMIN — LOSARTAN POTASSIUM 25 MILLIGRAM(S): 100 TABLET, FILM COATED ORAL at 10:23

## 2018-05-24 RX ADMIN — Medication 100 MILLIGRAM(S): at 22:10

## 2018-05-24 RX ADMIN — SODIUM CHLORIDE 3 MILLILITER(S): 9 INJECTION INTRAMUSCULAR; INTRAVENOUS; SUBCUTANEOUS at 06:26

## 2018-05-24 RX ADMIN — LIDOCAINE 1 PATCH: 4 CREAM TOPICAL at 22:12

## 2018-05-24 RX ADMIN — Medication 20 MILLIEQUIVALENT(S): at 07:42

## 2018-05-24 RX ADMIN — Medication 20 MILLIGRAM(S): at 16:33

## 2018-05-24 RX ADMIN — SENNA PLUS 2 TABLET(S): 8.6 TABLET ORAL at 22:10

## 2018-05-24 RX ADMIN — LOSARTAN POTASSIUM 25 MILLIGRAM(S): 100 TABLET, FILM COATED ORAL at 06:21

## 2018-05-24 RX ADMIN — LIDOCAINE 1 PATCH: 4 CREAM TOPICAL at 10:24

## 2018-05-24 RX ADMIN — Medication 10 MILLIEQUIVALENT(S): at 16:33

## 2018-05-24 RX ADMIN — Medication 20 MILLIGRAM(S): at 06:21

## 2018-05-24 RX ADMIN — SODIUM CHLORIDE 3 MILLILITER(S): 9 INJECTION INTRAMUSCULAR; INTRAVENOUS; SUBCUTANEOUS at 22:11

## 2018-05-24 RX ADMIN — ATORVASTATIN CALCIUM 20 MILLIGRAM(S): 80 TABLET, FILM COATED ORAL at 22:10

## 2018-05-24 RX ADMIN — Medication 10 MILLIEQUIVALENT(S): at 06:21

## 2018-05-24 NOTE — PROGRESS NOTE ADULT - SUBJECTIVE AND OBJECTIVE BOX
EPS Progress Note    S: in bed NAD     MEDICATIONS  (STANDING):  atorvastatin 20 milliGRAM(s) Oral at bedtime  clopidogrel Tablet 75 milliGRAM(s) Oral daily  docusate sodium 100 milliGRAM(s) Oral three times a day  furosemide   Injectable 20 milliGRAM(s) IV Push every 12 hours  heparin  Injectable 5000 Unit(s) SubCutaneous every 8 hours  lidocaine   Patch 1 Patch Transdermal daily  pantoprazole    Tablet 40 milliGRAM(s) Oral before breakfast  potassium chloride    Tablet ER 10 milliEquivalent(s) Oral every 12 hours  senna 2 Tablet(s) Oral at bedtime  sodium chloride 0.9% lock flush 3 milliLiter(s) IV Push every 8 hours  tamsulosin 0.4 milliGRAM(s) Oral at bedtime      Telemetry: Nelli, PVCs           General:  NAD         Chest:  CTA B/L     Cardiac:   s1/s2       Irregular     Abdomen:  +BS      Soft      Non-Tender      Non-Distended      Extremities:  no edema        Labs:                                                               10.2   5.2   )-----------( 162      ( 24 May 2018 11:25 )             31.9     05-24    139  |  101  |  23  ----------------------------<  88  3.8   |  27  |  0.89    Ca    9.3      24 May 2018 03:24  Phos  3.2     05-23  Mg     2.0     05-24      PT/INR - ( 24 May 2018 12:20 )   PT: 14.0 sec;   INR: 1.26          PTT - ( 24 May 2018 12:20 )  PTT:34.0 sec    Assessment/Plan:  88 yo M with history of HTN, HLD, BPH, chronic atrial fibrillation (on Eliquis), CAD s/o CABG (LIMA-LAD patent, SVG-OM1 and OM2 occluded and SVG-RCA atretic) 19 yrs ago at [facility], known RBBB and chronic systolic and diastolic heart failure EF 30-35%, with severe aortic stenosis, recent NSTEMI and CHF with a QI to pRCA and BAV on 4/11/18, underwent TAVR with Core valve on 5/22/18.   Monitor telemetry, continue TVP, telemetry with PVCs, will follow

## 2018-05-24 NOTE — PHYSICAL THERAPY INITIAL EVALUATION ADULT - MODALITIES TREATMENT COMMENTS
Of note, patient had a small amount of blood on pressure dressing prior to walking. After ambulation, slight increase in drainage noted however dressing was less than 50% saturated in total. Notified RN. Patient in no apparent distress.

## 2018-05-24 NOTE — PHYSICAL THERAPY INITIAL EVALUATION ADULT - PERTINENT HX OF CURRENT PROBLEM, REHAB EVAL
Patient is an 89 year old male s/p above procedure. Of note, patient had a small amount of bleeding through the dressing at the right groin site earlier today, cleared to work with PT by GISELLE George.

## 2018-05-24 NOTE — CHART NOTE - NSCHARTNOTEFT_GEN_A_CORE
As per Dr. Cyr, the right IJ TVP was removed but Cordis remains in place, patient tolerated procedure well.

## 2018-05-24 NOTE — PROGRESS NOTE ADULT - SUBJECTIVE AND OBJECTIVE BOX
SUBJECTIVE ASSESSMENT:     Diuresing well. Neg 2.2 L.  Denies chest pain, SOB.  No groin hematoma.  Tele: atrial fibrillation with chronic RBBB.                                                    Vital Signs Last 24 Hrs  T(C): 36.4 (24 May 2018 05:00), Max: 37.2 (23 May 2018 17:30)  T(F): 97.5 (24 May 2018 05:00), Max: 99 (23 May 2018 17:30)  HR: 66 (24 May 2018 08:25) (56 - 70)  BP: 159/68 (24 May 2018 08:25) (116/42 - 159/68)  BP(mean): 98 (24 May 2018 08:25) (60 - 115)  RR: 18 (24 May 2018 08:25) (12 - 25)  SpO2: 98% (24 May 2018 08:25) (88% - 99%)  I&O's Detail    23 May 2018 07:01  -  24 May 2018 07:00  --------------------------------------------------------  IN:    IV PiggyBack: 150 mL    Oral Fluid: 800 mL    sodium chloride 0.9%: 10 mL  Total IN: 960 mL    OUT:    Voided: 3220 mL  Total OUT: 3220 mL    Total NET: -2260 mL          PHYSICAL EXAM:    General: No acute distress.     HEENT: No JVD. No carotid bruits. No thyromegaly.    Cardiovascular: Irregularly irregular rhythm, normal S1, S2. No murmur. No gallops. No rubs.     Respiratory: CTA bilaterally. No rhonchi. No wheezing. No crackles.    Gastrointestinal: Soft, not tender. Not distended. Normal bowel sounds. No hepatosplenomegaly.     Extremities: No pedal edema. Both groins: soft, no hematoma.  Both femoral pulses 2/2+    Vascular: Normal peripheral pulses.     Neurological: A&Ox3. No focal deficit.     Skin: warm.        LABS:                        8.9    4.3   )-----------( 140      ( 24 May 2018 03:23 )             28.2       COUMADIN:  Yes/No. REASON: .    PT/INR - ( 24 May 2018 03:23 )   PT: 14.4 sec;   INR: 1.29          PTT - ( 24 May 2018 03:23 )  PTT:48.8 sec    05-24    139  |  101  |  23  ----------------------------<  88  3.8   |  27  |  0.89    Ca    9.3      24 May 2018 03:24  Phos  3.2     05-23  Mg     2.0     05-24            MEDICATIONS  (STANDING):  atorvastatin 20 milliGRAM(s) Oral at bedtime  clopidogrel Tablet 75 milliGRAM(s) Oral daily  docusate sodium 100 milliGRAM(s) Oral three times a day  furosemide   Injectable 20 milliGRAM(s) IV Push every 12 hours  heparin  Injectable 5000 Unit(s) SubCutaneous every 8 hours  lidocaine   Patch 1 Patch Transdermal daily  losartan 25 milliGRAM(s) Oral daily  pantoprazole    Tablet 40 milliGRAM(s) Oral before breakfast  potassium chloride    Tablet ER 10 milliEquivalent(s) Oral every 12 hours  senna 2 Tablet(s) Oral at bedtime  sodium chloride 0.9% lock flush 3 milliLiter(s) IV Push every 8 hours  tamsulosin 0.4 milliGRAM(s) Oral at bedtime    MEDICATIONS  (PRN):  acetaminophen   Tablet. 650 milliGRAM(s) Oral every 6 hours PRN Mild Pain (1 - 3)  polyethylene glycol 3350 17 Gram(s) Oral daily PRN Constipation        RADIOLOGY & ADDITIONAL TESTS:    CXR: Improved pulmonary venous congestion. Cardiomegaly. SUBJECTIVE ASSESSMENT:     Diuresing well. Neg 2.2 L.  Denies chest pain, SOB.  No groin hematoma.  Tele: atrial fibrillation with chronic bifasicular block.                                                    Vital Signs Last 24 Hrs  T(C): 36.4 (24 May 2018 05:00), Max: 37.2 (23 May 2018 17:30)  T(F): 97.5 (24 May 2018 05:00), Max: 99 (23 May 2018 17:30)  HR: 66 (24 May 2018 08:25) (56 - 70)  BP: 159/68 (24 May 2018 08:25) (116/42 - 159/68)  BP(mean): 98 (24 May 2018 08:25) (60 - 115)  RR: 18 (24 May 2018 08:25) (12 - 25)  SpO2: 98% (24 May 2018 08:25) (88% - 99%)  I&O's Detail    23 May 2018 07:01  -  24 May 2018 07:00  --------------------------------------------------------  IN:    IV PiggyBack: 150 mL    Oral Fluid: 800 mL    sodium chloride 0.9%: 10 mL  Total IN: 960 mL    OUT:    Voided: 3220 mL  Total OUT: 3220 mL    Total NET: -2260 mL          PHYSICAL EXAM:    General: No acute distress.     HEENT: No JVD. No carotid bruits. No thyromegaly.    Cardiovascular: Irregularly irregular rhythm, normal S1, S2. No murmur. No gallops. No rubs.     Respiratory: CTA bilaterally. No rhonchi. No wheezing. No crackles.    Gastrointestinal: Soft, not tender. Not distended. Normal bowel sounds. No hepatosplenomegaly.     Extremities: No pedal edema. Both groins: soft, no hematoma.  Both femoral pulses 2/2+    Vascular: Normal peripheral pulses.     Neurological: A&Ox3. No focal deficit.     Skin: warm.        LABS:                        8.9    4.3   )-----------( 140      ( 24 May 2018 03:23 )             28.2       COUMADIN:  Yes/No. REASON: .    PT/INR - ( 24 May 2018 03:23 )   PT: 14.4 sec;   INR: 1.29          PTT - ( 24 May 2018 03:23 )  PTT:48.8 sec    05-24    139  |  101  |  23  ----------------------------<  88  3.8   |  27  |  0.89    Ca    9.3      24 May 2018 03:24  Phos  3.2     05-23  Mg     2.0     05-24            MEDICATIONS  (STANDING):  atorvastatin 20 milliGRAM(s) Oral at bedtime  clopidogrel Tablet 75 milliGRAM(s) Oral daily  docusate sodium 100 milliGRAM(s) Oral three times a day  furosemide   Injectable 20 milliGRAM(s) IV Push every 12 hours  heparin  Injectable 5000 Unit(s) SubCutaneous every 8 hours  lidocaine   Patch 1 Patch Transdermal daily  losartan 25 milliGRAM(s) Oral daily  pantoprazole    Tablet 40 milliGRAM(s) Oral before breakfast  potassium chloride    Tablet ER 10 milliEquivalent(s) Oral every 12 hours  senna 2 Tablet(s) Oral at bedtime  sodium chloride 0.9% lock flush 3 milliLiter(s) IV Push every 8 hours  tamsulosin 0.4 milliGRAM(s) Oral at bedtime    MEDICATIONS  (PRN):  acetaminophen   Tablet. 650 milliGRAM(s) Oral every 6 hours PRN Mild Pain (1 - 3)  polyethylene glycol 3350 17 Gram(s) Oral daily PRN Constipation        RADIOLOGY & ADDITIONAL TESTS:    CXR: Improved pulmonary venous congestion. Cardiomegaly.

## 2018-05-24 NOTE — PROGRESS NOTE ADULT - ASSESSMENT
88 y/o male wtih PMHx HTN, HLD, BPH, chronic afib (on Eliquis), CAD s/o CABG (LIMA-LAD patent, SVG-OM1 and OM2 occluded and SVG-RCA atretic) 19 yrs ago at [facility], known RBBB and chronic systolic and diastolic heart failure EF 30-35%, with severe aortic stenosis, recent NSTEMI and CHF with a QI to pRCA and BAV on 4/11/18     1) Severe AS s/p TAVR with a 29 mm Evolut Pro on 5/22/2018  - POD 2  - Doing well.  - No high-grade AV block.  Unchanged RBBB on telemetry.  - No BB.  - D/C TVP  - Continue Plavix  - TTE 5/23/2018: LVEF 30-35%, trace AI, normal function of THV valve with mean gradient of 7 mmHg. Moderate to severe MR.  - D/C A line.    - Transfer to floor    2) Pre-existing RBBB and LAFB  - No evidence of high-grade AV block on telemetry. Unchanged RBBB  - Obtain EKG.  - D/C TVP    3) Chronic systolic heart failure, LVEF 35%  - CXR shows improved pulmonary venous congestion.  - DIuresis well. Neg 2.2 L.  - Continue lasix 20 mg iv bid.  - Continue Losartan 25 mg daily.    4) Chronic atrial fibrillation  - Rate is controlled.  - Restart Eliquis 2.5 mg bid.    5) CAD s/p CABG, recent PCI of ostial RCA with QI on 4/11/2018  - Continue Plavix.  - Continue statin.    6) HTN  - Controlled.  - Continue Losartan.    7) HLD  - Continue Atorvastatin.    Awaiting to be transferred to floor.  No floor bed available last night.  Anticipate home later this afternoon if doing well. 89 y/o male wtih PMHx HTN, HLD, BPH, chronic afib (on Eliquis), CAD s/o CABG (LIMA-LAD patent, SVG-OM1 and OM2 occluded and SVG-RCA atretic) 19 yrs ago at [facility], known bifasicular block and chronic systolic and diastolic heart failure EF 30-35%, with severe aortic stenosis, recent NSTEMI and CHF with a QI to pRCA and BAV on 4/11/18     1) Severe AS s/p TAVR with a 29 mm Evolut Pro on 5/22/2018  - POD 2  - Doing well.  - No high-grade AV block.  Unchanged bifasicular block on telemetry.  - No BB.  - D/C TVP  - Continue Plavix  - TTE 5/23/2018: LVEF 30-35%, trace AI, normal function of THV valve with mean gradient of 7 mmHg. Moderate to severe MR.  - D/C A line.    - Transfer to floor    2) Pre-existing RBBB and LAFB  - No evidence of high-grade AV block on telemetry. Unchanged RBBB  - Obtain EKG.  - D/C TVP    3) Chronic systolic heart failure, LVEF 35%  - CXR shows improved pulmonary venous congestion.  - DIuresis well. Neg 2.2 L.  - Continue lasix 20 mg iv bid.  - Continue Losartan 25 mg daily.    4) Chronic atrial fibrillation  - Rate is controlled.  - Restart Eliquis 2.5 mg bid.    5) CAD s/p CABG, recent PCI of ostial RCA with QI on 4/11/2018  - Continue Plavix.  - Continue statin.    6) HTN  - Controlled.  - Continue Losartan.    7) HLD  - Continue Atorvastatin.    Awaiting to be transferred to floor.  No floor bed available last night.  Anticipate home later this afternoon if doing well.

## 2018-05-24 NOTE — PROGRESS NOTE ADULT - SUBJECTIVE AND OBJECTIVE BOX
Patient discussed on morning rounds with       Operation / Date:     SUBJECTIVE ASSESSMENT:  89y Male         Vital Signs Last 24 Hrs  T(C): 36.3 (24 May 2018 13:48), Max: 37.2 (23 May 2018 17:30)  T(F): 97.4 (24 May 2018 13:48), Max: 99 (23 May 2018 17:30)  HR: 82 (24 May 2018 17:00) (56 - 82)  BP: 165/70 (24 May 2018 17:00) (116/42 - 165/70)  BP(mean): 101 (24 May 2018 17:00) (60 - 116)  RR: 14 (24 May 2018 17:00) (12 - 25)  SpO2: 99% (24 May 2018 17:00) (88% - 99%)  I&O's Detail    23 May 2018 07:01  -  24 May 2018 07:00  --------------------------------------------------------  IN:    IV PiggyBack: 150 mL    Oral Fluid: 800 mL    sodium chloride 0.9%: 10 mL  Total IN: 960 mL    OUT:    Voided: 3220 mL  Total OUT: 3220 mL    Total NET: -2260 mL      24 May 2018 07:01  -  24 May 2018 17:28  --------------------------------------------------------  IN:    Oral Fluid: 350 mL  Total IN: 350 mL    OUT:    Voided: 1200 mL  Total OUT: 1200 mL    Total NET: -850 mL          CHEST TUBE:  Yes/No. AIR LEAKS: Yes/No. Suction / H2O SEAL.   AUDREY DRAIN:  Yes/No.  EPICARDIAL WIRES: Yes/No.  TIE DOWNS: Yes/No.  GUTIERREZ: Yes/No.    PHYSICAL EXAM:    General:     Neurological:    Cardiovascular:    Respiratory:    Gastrointestinal:    Extremities:    Vascular:    Incision Sites:    LABS:                        10.2   5.2   )-----------( 162      ( 24 May 2018 11:25 )             31.9       COUMADIN:  Yes/No. REASON: .    PT/INR - ( 24 May 2018 12:20 )   PT: 14.0 sec;   INR: 1.26          PTT - ( 24 May 2018 12:20 )  PTT:34.0 sec    05-24    139  |  101  |  23  ----------------------------<  88  3.8   |  27  |  0.89    Ca    9.3      24 May 2018 03:24  Phos  3.2     05-23  Mg     2.0     05-24            MEDICATIONS  (STANDING):  atorvastatin 20 milliGRAM(s) Oral at bedtime  clopidogrel Tablet 75 milliGRAM(s) Oral daily  docusate sodium 100 milliGRAM(s) Oral three times a day  furosemide   Injectable 20 milliGRAM(s) IV Push every 12 hours  heparin  Injectable 5000 Unit(s) SubCutaneous every 8 hours  lidocaine   Patch 1 Patch Transdermal daily  pantoprazole    Tablet 40 milliGRAM(s) Oral before breakfast  potassium chloride    Tablet ER 10 milliEquivalent(s) Oral every 12 hours  senna 2 Tablet(s) Oral at bedtime  sodium chloride 0.9% lock flush 3 milliLiter(s) IV Push every 8 hours  tamsulosin 0.4 milliGRAM(s) Oral at bedtime    MEDICATIONS  (PRN):  acetaminophen   Tablet. 650 milliGRAM(s) Oral every 6 hours PRN Mild Pain (1 - 3)  polyethylene glycol 3350 17 Gram(s) Oral daily PRN Constipation        RADIOLOGY & ADDITIONAL TESTS: Patient discussed on morning rounds with Dr. Cyr    Operation / Date: 5/22/18: TAVR    SUBJECTIVE ASSESSMENT:  89 y/o Male seen and examined bedside. Patient states that he is having some pain at his left groin where the incision is, otherwise the patient has no other complaints. Patient is tolerating PO diet, and ambulating, he has not yet had a BM. Patient denies chest pain, palpitations, SOB, MCCARTHY, N/V/D, abdominal pain, fever or chills.          Vital Signs Last 24 Hrs  T(C): 36.3 (24 May 2018 13:48), Max: 37.2 (23 May 2018 17:30)  T(F): 97.4 (24 May 2018 13:48), Max: 99 (23 May 2018 17:30)  HR: 82 (24 May 2018 17:00) (56 - 82)  BP: 165/70 (24 May 2018 17:00) (116/42 - 165/70)  BP(mean): 101 (24 May 2018 17:00) (60 - 116)  RR: 14 (24 May 2018 17:00) (12 - 25)  SpO2: 99% (24 May 2018 17:00) (88% - 99%)  I&O's Detail    23 May 2018 07:01  -  24 May 2018 07:00  --------------------------------------------------------  IN:    IV PiggyBack: 150 mL    Oral Fluid: 800 mL    sodium chloride 0.9%: 10 mL  Total IN: 960 mL    OUT:    Voided: 3220 mL  Total OUT: 3220 mL    Total NET: -2260 mL      24 May 2018 07:01  -  24 May 2018 17:28  --------------------------------------------------------  IN:    Oral Fluid: 350 mL  Total IN: 350 mL    OUT:    Voided: 1200 mL  Total OUT: 1200 mL    Total NET: -850 mL          CHEST TUBE:  No.   AUDREY DRAIN:  No.  EPICARDIAL WIRES: No.  TIE DOWNS: Yes. Left groin. Cordis in place in Right IJ.   GUTIERREZ: No.    PHYSICAL EXAM:    General: Patient lying comfortably in bed, no acute distress     Neurological: Alert and oriented. No focal neurological deficits     Cardiovascular: Afib (chronic) irregularly irregular rhythm, regular rate, S1S2 no murmurs appreciated on exam     Respiratory: Clear to ausculation bilaterally     Gastrointestinal: Abdomen soft, non tender, non distended     Extremities: Warm and well perfused. No peripheral edema or calf tenderness.     Vascular: Peripheral pulses palpable bilaterally.    Incision Sites: Left groin incision site: oozing throughout the day, no hematoma, stitch in place. Right groin site: C/D/I.     LABS:                        10.2   5.2   )-----------( 162      ( 24 May 2018 11:25 )             31.9       COUMADIN: No. REASON: Eliquis    PT/INR - ( 24 May 2018 12:20 )   PT: 14.0 sec;   INR: 1.26          PTT - ( 24 May 2018 12:20 )  PTT:34.0 sec    05-24    139  |  101  |  23  ----------------------------<  88  3.8   |  27  |  0.89    Ca    9.3      24 May 2018 03:24  Phos  3.2     05-23  Mg     2.0     05-24            MEDICATIONS  (STANDING):  atorvastatin 20 milliGRAM(s) Oral at bedtime  clopidogrel Tablet 75 milliGRAM(s) Oral daily  docusate sodium 100 milliGRAM(s) Oral three times a day  furosemide   Injectable 20 milliGRAM(s) IV Push every 12 hours  heparin  Injectable 5000 Unit(s) SubCutaneous every 8 hours  lidocaine   Patch 1 Patch Transdermal daily  pantoprazole    Tablet 40 milliGRAM(s) Oral before breakfast  potassium chloride    Tablet ER 10 milliEquivalent(s) Oral every 12 hours  senna 2 Tablet(s) Oral at bedtime  sodium chloride 0.9% lock flush 3 milliLiter(s) IV Push every 8 hours  tamsulosin 0.4 milliGRAM(s) Oral at bedtime    MEDICATIONS  (PRN):  acetaminophen   Tablet. 650 milliGRAM(s) Oral every 6 hours PRN Mild Pain (1 - 3)  polyethylene glycol 3350 17 Gram(s) Oral daily PRN Constipation        RADIOLOGY & ADDITIONAL TESTS:    < from: Xray Chest 1 View- PORTABLE-Routine (05.24.18 @ 06:13) >    INTERPRETATION:  Chest x-ray    Indication: Postoperative patient    A portable frontal view of the chest is compared to the prior study dated   5/23/2018. The patient is again status post TAVR. Pacemaker electrode is   unchanged. Heart size is again enlarged. There are moderate-sized   bilateral pleural effusions without significant change. No new   consolidation. No pneumothorax.      IMPRESSION:Persistent cardiomegaly and pleural effusions.      < end of copied text >

## 2018-05-24 NOTE — PHYSICAL THERAPY INITIAL EVALUATION ADULT - ADDITIONAL COMMENTS
Patient reports that prior to admission he was fully independent with all mobility and ADLs. Lives in a 3-flight walk up apartment.

## 2018-05-24 NOTE — PROGRESS NOTE ADULT - ASSESSMENT
85 year old M PMHx former smoker,HTN, HLD, AF (s/p ablation, on Coumadin), active prostate CA (non-metastatic), s/p Lupron injection (for radiation in Oct), known CAD s/p CABG/MVRTVR at [facility] in 2006, PCI to LAD/D1 on 8/24/17 and chronic systolic and diastolic failure with severe AS. Patient was seen as an outpatient by Dr. Cyr and deemed a surgical candidate. On 5/22/18 he underwent Transinnominate artery approach TAVR (Core), EF 35%. Intra op Brief LBBB, arrived to CT ICU extubated, with TVP in R IJ. QRS narrowed overnight, EP study on hold. POD1, kevin removed, Transferred to .    A/P:  Neurovascular: No delirium. Pain well controlled with current regimen.  -Tylenol/tramadol prn pain    Cardiovascular: Hemodynamically stable. HR controlled.  -Hx HTN, HLD, Afib (on coumadin), CAD s/p CABG/MVR/TVR, PCI, severe AS s/p TAVR core valve  -Periop LBBB, narrowed OVN, EP following, no study for now, TVP to remain in today  -f/u Echo results today, EKG in AM  -Continue losartan 25mg daily (half home dose), plavix 37.5 mg daily, lipitor 40mg qhs, holding BB 2/2 LBBB  -Coumadin 2.5 mg given last night, AM INR 1.02, 5mg tonight, f/u AM INR  -monitor HR/BP/tele      Respiratory: 02 Sat = 94% on RA.  -If on oxygen wean to RA from for O2 Sat > 93%.  -Encourage ambulation, C+DB and Use of IS 10x / hr while awake.  -CXR- mild pulm congestion, f/u AM CXR  -Cont lasix 20mg IV for diuresis    GI: Stable.  -protonix for GI PPX.  -Continue bowel regimen  -PO Diet.    Renal / : BUN/Cr 22/0.87  -Monitor renal function.  -Monitor I/O's.  -cont diuresis  -Hx BPH, continue proscar/flomax    Endocrine: No active issues    -A1c 5.6  -TSH WNL    Hematologic: H&H 10/32  -f/u AM CBC    ID: Afebrile, WBC 8.4  -Observe for SIRS/Sepsis Syndrome.    Prophylaxis:  -DVT prophylaxis with 5000 SubQ Heparin q8h, off when INR therapeutic  -SCD's    Disposition:  -Home when medically ready 89 y/o male with PMHx HTN, HLD, BPH, chronic afib (on Eliquis), CAD s/o CABG (LIMA-LAD patent, SVG-OM1 and OM2 occluded and SVG-RCA atretic) 19 yrs ago at [facility], known bifasicular block and chronic systolic and diastolic heart failure EF 30-35%, with severe aortic stenosis, recent NSTEMI and CHF with a QI to pRCA and BAV on 4/11/18. ** States he has an inguinal hernia that needs repair.  He was here recently for surgery to fix it, but during that admission he was SOB, and the AS/CHF was discovered which is why inguinal hernia repair was not performed on that visit.  On 5/22/18 patient went for Trans-femoral TAVR with a 29 Evolut Pro, and is recovering well. TTE 5/23/2018: LVEF 30-35%, trace AI, normal function of THV valve with mean gradient of 7 mmHg. Moderate to severe MR. 5/24/18 EKG shows pre-existing RBBB and LAFB. TVP was removed today, cordis remains in place.     A/P:  Neurovascular: No delirium. Pain well controlled with current regimen.  - Continue Tylenol 650mg PO q6hrs PRN/Lidoderm    Cardiovascular: Hemodynamically stable.   -Hx HTN, HLD, Afib (on coumadin), CAD s/p CABG/MVR/TVR, PCI, severe AS s/p TAVR core valve  -Periop LBBB, narrowed OVN, EP following, no study for now, TVP to remain in today  -f/u Echo results today, EKG in AM  -Continue losartan 25mg daily (half home dose), plavix 37.5 mg daily, lipitor 40mg qhs, holding BB 2/2 LBBB  -Coumadin 2.5 mg given last night, AM INR 1.02, 5mg tonight, f/u AM INR  -monitor HR/BP/tele      Respiratory: 02 Sat = 94% on RA.  -If on oxygen wean to RA from for O2 Sat > 93%.  -Encourage ambulation, C+DB and Use of IS 10x / hr while awake.  -CXR- mild pulm congestion, f/u AM CXR  -Cont lasix 20mg IV for diuresis    GI: Stable.  -protonix for GI PPX.  -Continue bowel regimen  -PO Diet.    Renal / : BUN/Cr 22/0.87  -Monitor renal function.  -Monitor I/O's.  -cont diuresis  -Hx BPH, continue proscar/flomax    Endocrine: No active issues    -A1c 5.6  -TSH WNL    Hematologic: H&H 10/32  -f/u AM CBC    ID: Afebrile, WBC 8.4  -Observe for SIRS/Sepsis Syndrome.    Prophylaxis:  -DVT prophylaxis with 5000 SubQ Heparin q8h, off when INR therapeutic  -SCD's    Disposition:  -Home when medically ready 88 y/o male with PMHx HTN, HLD, BPH, chronic afib (on Eliquis), CAD s/o CABG (LIMA-LAD patent, SVG-OM1 and OM2 occluded and SVG-RCA atretic) [facility] yrs ago at [facility], known bifasicular block and chronic systolic and diastolic heart failure EF 30-35%, with severe aortic stenosis, recent NSTEMI and CHF with a QI to pRCA and BAV on 4/11/18. ** States he has an inguinal hernia that needs repair.  He was here recently for surgery to fix it, but during that admission he was SOB, and the AS/CHF was discovered which is why inguinal hernia repair was not performed on that visit.  On 5/22/18 patient went for Trans-femoral TAVR with a 29 Evolut Pro, and is recovering well. TTE 5/23/2018: LVEF 30-35%, trace AI, normal function of THV valve with mean gradient of 7 mmHg. Moderate to severe MR. 5/24/18 EKG shows pre-existing RBBB and LAFB. TVP was removed today, cordis remains in place.     A/P:  Neurovascular: No delirium. Pain well controlled with current regimen.  - Continue Tylenol 650mg PO q6hrs PRN/Lidoderm    Cardiovascular: Hx HTN, HLD, chronic afib (Eliquis), CAD, chronic systolic and diastolic HF, EF 30-35%, severe AS s/p TAVR.   - Severe AS s/p TAVR with a 29 mm Evolut Pro on 5/22/2018.      - Pre-existing RBBB and unchanged bifasicular block on telemetry.     - Hold BB.     - TVP removed today. Cordis remains in place.     - Continue Plavix.      - F/u AM EKG  - Chronic systolic and diastolic HF, EF 30-35%     - CXR shows pulmonary congestion.      - Continue Lasix 20mg IV BID with Potassium supplementation 10mEQ daily. Diuresing well.     - Increased Losartan to 50mg PO qd.   - Chronic Atrial Fibrillation.     - Rate controlled.     - Eliquis held tonight 2/2 left groin oozing saturating gauzes q2hrs throughout the day.      - Restart Eliquis 2.5mg BID after Cordis is removed and left groin is stable.   - Continue Statin.  -monitor HR/BP/tele      Respiratory: 02 Sat = 94% on RA.  -If on oxygen wean to RA from for O2 Sat > 93%.  -Encourage ambulation, C+DB and Use of IS 10x / hr while awake.  -CXR- mild pulm congestion, f/u AM CXR  -Cont lasix 20mg IV for diuresis    GI: Stable.  -protonix for GI PPX.  -Continue bowel regimen  -PO Diet.    Renal / : BUN/Cr 22/0.87  -Monitor renal function.  -Monitor I/O's.  -cont diuresis  -Hx BPH, continue proscar/flomax    Endocrine: No active issues    -A1c 5.6  -TSH WNL    Hematologic: H&H 10/32  -f/u AM CBC    ID: Afebrile, WBC 8.4  -Observe for SIRS/Sepsis Syndrome.    Prophylaxis:  -DVT prophylaxis with 5000 SubQ Heparin q8h, off when INR therapeutic  -SCD's    Disposition:  -Home when medically ready 89 y/o male with PMHx HTN, HLD, BPH, chronic afib (on Eliquis), CAD s/o CABG (LIMA-LAD patent, SVG-OM1 and OM2 occluded and SVG-RCA atretic) 19 yrs ago at [facility], known bifasicular block and chronic systolic and diastolic heart failure EF 30-35%, with severe aortic stenosis, recent NSTEMI and CHF with a QI to pRCA and BAV on 4/11/18. ** States he has an inguinal hernia that needs repair.  He was here recently for surgery to fix it, but during that admission he was SOB, and the AS/CHF was discovered which is why inguinal hernia repair was not performed on that visit.  On 5/22/18 patient went for Trans-femoral TAVR with a 29 Evolut Pro, and is recovering well. TTE 5/23/2018: LVEF 30-35%, trace AI, normal function of THV valve with mean gradient of 7 mmHg. Moderate to severe MR. 5/24/18 EKG shows pre-existing RBBB and LAFB. TVP was removed today, cordis remains in place.     A/P:  Neurovascular: No delirium. Pain well controlled with current regimen.  - Continue Tylenol 650mg PO q6hrs PRN/Lidoderm    Cardiovascular: Hx HTN, HLD, chronic afib (Eliquis), CAD, chronic systolic and diastolic HF, EF 30-35%, severe AS s/p TAVR.   - Severe AS s/p TAVR with a 29 mm Evolut Pro on 5/22/2018.      - Pre-existing RBBB and unchanged bifasicular block on telemetry.     - Hold BB.     - TVP removed today. Cordis remains in place.     - Continue Plavix.      - F/u AM EKG  - Chronic systolic and diastolic HF, EF 30-35%     - CXR shows pulmonary congestion.      - Continue Lasix 20mg IV BID with Potassium supplementation 10mEQ daily. Diuresing well.     - Increased Losartan to 50mg PO qd.   - Chronic Atrial Fibrillation.     - Rate controlled.     - Eliquis held tonight 2/2 left groin oozing saturating gauzes q2hrs throughout the day.      - Restart Eliquis 2.5mg BID after Cordis is removed and left groin is stable.   - Continue Statin.  -monitor HR/BP/tele    Respiratory: 02 Sat = 99% on RA.  -If on oxygen wean to RA from for O2 Sat > 93%.  -Encourage ambulation, C+DB and Use of IS 10x / hr while awake.  -CXR- mild pulm congestion, f/u AM CXR  -Continue Lasix 20mg IV BID for diuresis    GI: Stable.  -protonix for GI PPX.  -Continue bowel regimen: colace, miralax, senna.  -PO Diet.    Renal / : BUN/Cr 23/0.89  -Monitor renal function.  -Monitor I/O's.  -cont diuresis  -continue flomax    Endocrine: No active issues    -A1c 5.8  -TSH 2.746    Hematologic: H&H 10.2/31.9  -f/u AM CBC    ID: Afebrile, WBC 5.2  -Observe for SIRS/Sepsis Syndrome.    Prophylaxis:  -DVT prophylaxis with 5000 SubQ Heparin q8h  -SCD's    Disposition:  -Home tomorrow

## 2018-05-25 ENCOUNTER — TRANSCRIPTION ENCOUNTER (OUTPATIENT)
Age: 83
End: 2018-05-25

## 2018-05-25 LAB
ANION GAP SERPL CALC-SCNC: 13 MMOL/L — SIGNIFICANT CHANGE UP (ref 5–17)
BUN SERPL-MCNC: 23 MG/DL — SIGNIFICANT CHANGE UP (ref 7–23)
CALCIUM SERPL-MCNC: 9.8 MG/DL — SIGNIFICANT CHANGE UP (ref 8.4–10.5)
CHLORIDE SERPL-SCNC: 100 MMOL/L — SIGNIFICANT CHANGE UP (ref 96–108)
CO2 SERPL-SCNC: 25 MMOL/L — SIGNIFICANT CHANGE UP (ref 22–31)
CREAT SERPL-MCNC: 0.98 MG/DL — SIGNIFICANT CHANGE UP (ref 0.5–1.3)
GLUCOSE SERPL-MCNC: 98 MG/DL — SIGNIFICANT CHANGE UP (ref 70–99)
HCT VFR BLD CALC: 30.7 % — LOW (ref 39–50)
HGB BLD-MCNC: 10.1 G/DL — LOW (ref 13–17)
MAGNESIUM SERPL-MCNC: 2 MG/DL — SIGNIFICANT CHANGE UP (ref 1.6–2.6)
MCHC RBC-ENTMCNC: 32.2 PG — SIGNIFICANT CHANGE UP (ref 27–34)
MCHC RBC-ENTMCNC: 32.9 G/DL — SIGNIFICANT CHANGE UP (ref 32–36)
MCV RBC AUTO: 97.8 FL — SIGNIFICANT CHANGE UP (ref 80–100)
PLATELET # BLD AUTO: 167 K/UL — SIGNIFICANT CHANGE UP (ref 150–400)
POTASSIUM SERPL-MCNC: 4.4 MMOL/L — SIGNIFICANT CHANGE UP (ref 3.5–5.3)
POTASSIUM SERPL-SCNC: 4.4 MMOL/L — SIGNIFICANT CHANGE UP (ref 3.5–5.3)
RBC # BLD: 3.14 M/UL — LOW (ref 4.2–5.8)
RBC # FLD: 13.9 % — SIGNIFICANT CHANGE UP (ref 10.3–16.9)
SODIUM SERPL-SCNC: 138 MMOL/L — SIGNIFICANT CHANGE UP (ref 135–145)
WBC # BLD: 5.4 K/UL — SIGNIFICANT CHANGE UP (ref 3.8–10.5)
WBC # FLD AUTO: 5.4 K/UL — SIGNIFICANT CHANGE UP (ref 3.8–10.5)

## 2018-05-25 PROCEDURE — 36415 COLL VENOUS BLD VENIPUNCTURE: CPT

## 2018-05-25 PROCEDURE — 93005 ELECTROCARDIOGRAM TRACING: CPT

## 2018-05-25 PROCEDURE — 85610 PROTHROMBIN TIME: CPT

## 2018-05-25 PROCEDURE — 71045 X-RAY EXAM CHEST 1 VIEW: CPT | Mod: 26

## 2018-05-25 PROCEDURE — 85027 COMPLETE CBC AUTOMATED: CPT

## 2018-05-25 PROCEDURE — 80053 COMPREHEN METABOLIC PANEL: CPT

## 2018-05-25 PROCEDURE — 86900 BLOOD TYPING SEROLOGIC ABO: CPT

## 2018-05-25 PROCEDURE — 93306 TTE W/DOPPLER COMPLETE: CPT

## 2018-05-25 PROCEDURE — C1889: CPT

## 2018-05-25 PROCEDURE — 84132 ASSAY OF SERUM POTASSIUM: CPT

## 2018-05-25 PROCEDURE — 82962 GLUCOSE BLOOD TEST: CPT

## 2018-05-25 PROCEDURE — 85025 COMPLETE CBC W/AUTO DIFF WBC: CPT

## 2018-05-25 PROCEDURE — L8699: CPT

## 2018-05-25 PROCEDURE — 97161 PT EVAL LOW COMPLEX 20 MIN: CPT

## 2018-05-25 PROCEDURE — 86850 RBC ANTIBODY SCREEN: CPT

## 2018-05-25 PROCEDURE — C1887: CPT

## 2018-05-25 PROCEDURE — P9045: CPT

## 2018-05-25 PROCEDURE — 86901 BLOOD TYPING SEROLOGIC RH(D): CPT

## 2018-05-25 PROCEDURE — C1769: CPT

## 2018-05-25 PROCEDURE — 83735 ASSAY OF MAGNESIUM: CPT

## 2018-05-25 PROCEDURE — 93010 ELECTROCARDIOGRAM REPORT: CPT

## 2018-05-25 PROCEDURE — C1760: CPT

## 2018-05-25 PROCEDURE — C1894: CPT

## 2018-05-25 PROCEDURE — 80048 BASIC METABOLIC PNL TOTAL CA: CPT

## 2018-05-25 PROCEDURE — 82330 ASSAY OF CALCIUM: CPT

## 2018-05-25 PROCEDURE — 82803 BLOOD GASES ANY COMBINATION: CPT

## 2018-05-25 PROCEDURE — 84443 ASSAY THYROID STIM HORMONE: CPT

## 2018-05-25 PROCEDURE — 84100 ASSAY OF PHOSPHORUS: CPT

## 2018-05-25 PROCEDURE — 71045 X-RAY EXAM CHEST 1 VIEW: CPT

## 2018-05-25 PROCEDURE — 99238 HOSP IP/OBS DSCHRG MGMT 30/<: CPT | Mod: 24

## 2018-05-25 PROCEDURE — 86923 COMPATIBILITY TEST ELECTRIC: CPT

## 2018-05-25 PROCEDURE — 85730 THROMBOPLASTIN TIME PARTIAL: CPT

## 2018-05-25 PROCEDURE — 83880 ASSAY OF NATRIURETIC PEPTIDE: CPT

## 2018-05-25 PROCEDURE — 84295 ASSAY OF SERUM SODIUM: CPT

## 2018-05-25 RX ORDER — TAMSULOSIN HYDROCHLORIDE 0.4 MG/1
1 CAPSULE ORAL
Qty: 30 | Refills: 0 | OUTPATIENT
Start: 2018-05-25 | End: 2018-06-23

## 2018-05-25 RX ORDER — LOSARTAN POTASSIUM 100 MG/1
1 TABLET, FILM COATED ORAL
Qty: 30 | Refills: 0 | OUTPATIENT
Start: 2018-05-25 | End: 2018-06-23

## 2018-05-25 RX ORDER — POTASSIUM CHLORIDE 20 MEQ
1 PACKET (EA) ORAL
Qty: 60 | Refills: 0 | OUTPATIENT
Start: 2018-05-25 | End: 2018-06-23

## 2018-05-25 RX ORDER — PANTOPRAZOLE SODIUM 20 MG/1
1 TABLET, DELAYED RELEASE ORAL
Qty: 30 | Refills: 0 | OUTPATIENT
Start: 2018-05-25 | End: 2018-06-23

## 2018-05-25 RX ORDER — ACETAMINOPHEN 500 MG
2 TABLET ORAL
Qty: 112 | Refills: 0 | OUTPATIENT
Start: 2018-05-25 | End: 2018-06-07

## 2018-05-25 RX ORDER — CLOPIDOGREL BISULFATE 75 MG/1
1 TABLET, FILM COATED ORAL
Qty: 30 | Refills: 0 | OUTPATIENT
Start: 2018-05-25 | End: 2018-06-23

## 2018-05-25 RX ORDER — ASPIRIN/CALCIUM CARB/MAGNESIUM 324 MG
1 TABLET ORAL
Qty: 30 | Refills: 0 | OUTPATIENT
Start: 2018-05-25 | End: 2018-06-23

## 2018-05-25 RX ORDER — SENNA PLUS 8.6 MG/1
2 TABLET ORAL
Qty: 20 | Refills: 0 | OUTPATIENT
Start: 2018-05-25 | End: 2018-06-03

## 2018-05-25 RX ORDER — DOCUSATE SODIUM 100 MG
1 CAPSULE ORAL
Qty: 30 | Refills: 0 | OUTPATIENT
Start: 2018-05-25 | End: 2018-06-03

## 2018-05-25 RX ORDER — ATORVASTATIN CALCIUM 80 MG/1
1 TABLET, FILM COATED ORAL
Qty: 30 | Refills: 0 | OUTPATIENT
Start: 2018-05-25 | End: 2018-06-23

## 2018-05-25 RX ORDER — POLYETHYLENE GLYCOL 3350 17 G/17G
17 POWDER, FOR SOLUTION ORAL
Qty: 119 | Refills: 0 | OUTPATIENT
Start: 2018-05-25 | End: 2018-05-31

## 2018-05-25 RX ORDER — FUROSEMIDE 40 MG
1 TABLET ORAL
Qty: 30 | Refills: 0 | OUTPATIENT
Start: 2018-05-25 | End: 2018-06-23

## 2018-05-25 RX ADMIN — Medication 10 MILLIEQUIVALENT(S): at 06:48

## 2018-05-25 RX ADMIN — PANTOPRAZOLE SODIUM 40 MILLIGRAM(S): 20 TABLET, DELAYED RELEASE ORAL at 06:48

## 2018-05-25 RX ADMIN — HEPARIN SODIUM 5000 UNIT(S): 5000 INJECTION INTRAVENOUS; SUBCUTANEOUS at 06:48

## 2018-05-25 RX ADMIN — LOSARTAN POTASSIUM 50 MILLIGRAM(S): 100 TABLET, FILM COATED ORAL at 06:49

## 2018-05-25 RX ADMIN — SODIUM CHLORIDE 3 MILLILITER(S): 9 INJECTION INTRAMUSCULAR; INTRAVENOUS; SUBCUTANEOUS at 14:19

## 2018-05-25 RX ADMIN — CLOPIDOGREL BISULFATE 75 MILLIGRAM(S): 75 TABLET, FILM COATED ORAL at 10:35

## 2018-05-25 RX ADMIN — Medication 20 MILLIGRAM(S): at 06:48

## 2018-05-25 RX ADMIN — SODIUM CHLORIDE 3 MILLILITER(S): 9 INJECTION INTRAMUSCULAR; INTRAVENOUS; SUBCUTANEOUS at 06:49

## 2018-05-25 RX ADMIN — Medication 100 MILLIGRAM(S): at 06:48

## 2018-05-25 NOTE — PROGRESS NOTE ADULT - SUBJECTIVE AND OBJECTIVE BOX
Patient discussed on morning rounds with Dr. Cyr    Operation / Date: 5/22/18: TAVR    Surgeon: Dr. Cyr    Referring Physician: Dr. Knutson    SUBJECTIVE ASSESSMENT:   89 y/o Male seen and examined bedside ([employer]  #[id number]). Patient is offering no complaints at this time. Denies chest pain, palpitations, SOB, N/V/D, abdominal pain, dizziness, fever or chills. Patient has not yet had a bowel movement since admission to the hospital but denies bloating or abdominal pain. Patient is ambulating and tolerating PO diet.     AND HOSPITAL COURSE:  89 y/o male with PMHx HTN, HLD, BPH, chronic afib (on Eliquis), CAD s/o CABG (LIMA-LAD patent, SVG-OM1 and OM2 occluded and SVG-RCA atretic) in 1999 at [facility], known bifasicular block and chronic systolic and diastolic heart failure EF 30-35%, with severe aortic stenosis, recent NSTEMI with a QI to pRCA and BAV on 4/11/18. ** States he has an inguinal hernia that needs repair and he was here recently for surgery to fix it, but during that admission he was short of breath, and the Aortic Stenosis/CHF was discovered which is why inguinal hernia repair was not performed on that visit.  On 5/22/18 patient went for Trans-femoral TAVR with a 29 Evolut Pro, and is recovering well. He had a stitch placed in the left groin site, which continued to ooze throughout the next day, but slowed down significantly prior to discharge, stitch was removed, no hematoma. TTE on 5/23/2018: LVEF 30-35%, trace AI, normal function of THV valve with mean gradient of 7 mmHg and moderate to severe MR. 5/24/18 EKG shows pre-existing RBBB and LAFB, with no new changes.  TVP was removed on 5/25/18, and patient was set up with an event monitor inpt and will be discharged home with. Of note, patient will be discharged on dual-antiplatelet therapy (ASA and Plavix), and will not be discharged on Eliquis 2/2 left groin site. Patient will be re-evaluated outpatient with Dr. Cyr at his follow up appointment on 6/4/18, and Dr. Cyr will determine at that time if he wants to change back to Eliquis and Plavix. Patient is otherwise doing well, with no complaints at the time of discharge. Patient denies chest pain, palpitations, SOB, N/V/D, abdominal pain, dizziness, fever or chills. Patient is tolerating PO diet, ambulating, and voiding spontaneously. As per Dr. Cyr and the CT Surgery team, patient is medically stable for discharge.       Vital Signs Last 24 Hrs  T(C): 36.4 (25 May 2018 13:34), Max: 36.5 (25 May 2018 01:00)  T(F): 97.6 (25 May 2018 13:34), Max: 97.7 (25 May 2018 01:00)  HR: 76 (25 May 2018 12:15) (74 - 82)  BP: 147/67 (25 May 2018 12:15) (131/58 - 165/70)  BP(mean): 97 (25 May 2018 12:15) (83 - 113)  RR: 16 (25 May 2018 12:15) (12 - 19)  SpO2: 98% (25 May 2018 12:15) (96% - 99%)    EPICARDIAL WIRES REMOVED: Yes  TIE DOWNS REMOVED: Yes.    PHYSICAL EXAM:    General: Patient lying comfortably in bed, no acute distress     Neurological: Alert and oriented. No focal neurological deficits     Cardiovascular: Afib (chronic) irregularly irregular rhythm, regular rate, S1S2 no murmurs appreciated on exam     Respiratory: Clear to ausculation bilaterally     Gastrointestinal: Abdomen soft, non tender, non distended     Extremities: Warm and well perfused. No peripheral edema or calf tenderness.     Vascular: Peripheral pulses palpable bilaterally.    Incision Sites: Left groin incision site: C/D/I, no hematoma, stitch removed. Right groin site: C/D/I, no hematoma.   LABS:                        10.1   5.4   )-----------( 167      ( 25 May 2018 06:50 )             30.7       COUMADIN:  No.            PT/INR - ( 24 May 2018 12:20 )   PT: 14.0 sec;   INR: 1.26          PTT - ( 24 May 2018 12:20 )  PTT:34.0 sec    05-25    138  |  100  |  23  ----------------------------<  98  4.4   |  25  |  0.98    Ca    9.8      25 May 2018 06:50  Mg     2.0     05-25            Discharge CXR: < from: Xray Chest 1 View- PORTABLE-Routine (05.25.18 @ 07:34) >    INTERPRETATION:  Chest x-ray    Indication: Status post TAVR surgery     A portable frontal view of the chest is compared to the prior study dated   5/24/2018. Pacemaker electrode has been removed. Right IJ vascular sheath   remains in place. The patient is again status post TAVR. Heart size is   again enlarged. Small bilateral pleural effusions are present, decreased   since prior study. No new infiltrates. No pneumothorax.      IMPRESSION: Small pleural effusions, improved since prior study.          < end of copied text >      Discharge ECHO: < from: Echocardiogram (05.23.18 @ 12:24) >    INTERPRETATION:  Patient Height: 168.0 cm  Patient Weight: 59.0 kg  Heart Rate: 60 bpm  Systolic Pressure: 157 mmHg  Diastolic Pressure: 57 mmHg  BSA: 1.7m^2  Interpretation Summary  The left ventricle is mildly dilated. 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  The other walls are hypokinetic. The left   ventricular ejection fraction is moderately reduced. The left ventricular   ejection fraction is estimated to be 30-35%.  The left atrium is   dilated. The   mitral inflow pattern is consistent with restrictive left ventricular   filling,   suggestive of severely elevated left atrial pressure (>20mmHg).  Right   atrial   size is normal.The right ventricle is normal in size and   function.Transcatheter valve in the aortic position. There is trace   aortic   regurgitation. The peak pressure gradient is 16 mmHg. The mean pressure   gradient is 7 mmHg.  Mitral valve thickening noted. Mitral annular   calcification noted. There is moderate to severe mitral regurgitation.    There   is moderate to severe tricuspid regurgitation.There is severe pulmonary   hypertension. The pulmonary artery systolic pressure is estimated to be   60   mmHg. There is mild to moderate pulmonic regurgitation.  No aortic root   dilatation.The inferior vena cava is normal in size (<2.1 cm) with   abnormal   inspiratory collapse (<50%) consistent with mildly elevated right atrial  pressure (  8mmHg, range 5-10mmHg).  There is no pericardial effusion.   Left pleural effusion noted.    < end of copied text >

## 2018-05-25 NOTE — DISCHARGE NOTE ADULT - CARE PLAN
Principal Discharge DX:	Aortic stenosis  Goal:	To follow up after surgery  Assessment and plan of treatment:	-Please follow up with Dr. Cyr on Monday 6/4/18 at 11:30am.  The office is located at [facility], 4th floor. Call us with any questions #[phone].    -Walk daily as tolerated and use your incentive spirometer every hour.    -No driving or strenuous activity/exercise until cleared by your surgeon.    -Gently clean your incisions with anti-bacterial soap and water, pat dry.  You may leave them open to air.    -Call your doctor if you have shortness of breath, chest pain not relieved by pain medication, dizziness, fever >101.5, or increased redness or drainage from incisions.  Secondary Diagnosis:	Afib  Goal:	To follow up with your primary care doctor  Assessment and plan of treatment:	- Please follow up with Dr. Calvin Knutson on Thursday, 6/7/18 at 10:30am to review your medications and better control your blood pressure and heart rhythm.

## 2018-05-25 NOTE — PROGRESS NOTE ADULT - ASSESSMENT
-Please follow up with Dr. Cyr on Monday 6/4/18 at 11:30am.    - Please follow up with Dr. Calvin Knutson on Thursday, 6/7/18 at 10:30am.

## 2018-05-25 NOTE — DISCHARGE NOTE ADULT - PROVIDER TOKENS
TOKEN:'[id number]',TOKEN:'[id number]' TOKEN:'[id number]',TOKEN:'[id number]',TOKEN:'[id number]'

## 2018-05-25 NOTE — DISCHARGE NOTE ADULT - MEDICATION SUMMARY - MEDICATIONS TO TAKE
I will START or STAY ON the medications listed below when I get home from the hospital:    acetaminophen 325 mg oral tablet  -- 2 tab(s) by mouth every 6 hours, As needed, Mild Pain (1 - 3) MDD:Do not exceed 4000mg  -- Indication: For Pain    Aspirin Enteric Coated 81 mg oral delayed release tablet  -- 1 tab(s) by mouth once a day   -- Swallow whole.  Do not crush.  Take with food or milk.    -- Indication: For Blood thinner    losartan 50 mg oral tablet  -- 1 tab(s) by mouth once a day  -- Indication: For Blood pressure    tamsulosin 0.4 mg oral capsule  -- 1 cap(s) by mouth once a day (at bedtime)  -- Indication: For BPH    atorvastatin 20 mg oral tablet  -- 1 tab(s) by mouth once a day (at bedtime)  -- Indication: For Cholesterol    clopidogrel 75 mg oral tablet  -- 1 tab(s) by mouth once a day  -- Indication: For Blood thinner    Lasix 40 mg oral tablet  -- 1 tab(s) by mouth once a day  -- Indication: For Water pill    docusate sodium 100 mg oral capsule  -- 1 cap(s) by mouth 3 times a day, As Needed -for constipation   HOLD for loose stools.    -- Indication: For Constipation    polyethylene glycol 3350 oral powder for reconstitution  -- 17 gram(s) by mouth once a day, As needed, Constipation.    HOLD for loose stools    -- Indication: For Constipation    senna oral tablet  -- 2 tab(s) by mouth once a day (at bedtime), As Needed -for constipation  HOLD for loose stools.   -- Indication: For Constipation    potassium chloride 10 mEq oral tablet, extended release  -- 1 tab(s) by mouth every 12 hours  -- Indication: For Electrolytes while on lasix    Protonix 40 mg oral delayed release tablet  -- 1 tab(s) by mouth once a day  -- Indication: For Stomach ulcer prevention

## 2018-05-25 NOTE — DISCHARGE NOTE ADULT - CARE PROVIDER_API CALL
Ancelmo Cyr), Cardiology; Interventional Cardiology  [address]  [address]th [place] [address]  Phone: [phone]  Fax: [phone]    Calvin Knutson), Internal Medicine  [address] [id number]  Phone: [phone]  Fax: [phone] Ancelmo Cyr), Cardiology; Interventional Cardiology  [address]  [address]th [place] [address]  Phone: [phone]  Fax: [phone]    Calvin Knutson), Internal Medicine  [address] [id number]  Phone: [phone]  Fax: [phone]    Chantel Albarado), Surgery; Surgical Oncology  [address]  [address] [place] [address]  Phone: [phone]  Fax: [phone]

## 2018-05-25 NOTE — DISCHARGE NOTE ADULT - [email or web address]_GEN_ALL_CORE
medication teaching and assessment/wound care and assessment/teaching and training/rehabilitation services/observation and assessment

## 2018-05-25 NOTE — DISCHARGE NOTE ADULT - CARE PROVIDERS DIRECT ADDRESSES
,[email or web address],DirectAddress_Unknown

## 2018-05-25 NOTE — DISCHARGE NOTE ADULT - PLAN OF CARE
To follow up after surgery -Please follow up with Dr. Cyr on Monday 6/4/18 at 11:30am.  The office is located at [facility], 4th floor. Call us with any questions #[phone].    -Walk daily as tolerated and use your incentive spirometer every hour.    -No driving or strenuous activity/exercise until cleared by your surgeon.    -Gently clean your incisions with anti-bacterial soap and water, pat dry.  You may leave them open to air.    -Call your doctor if you have shortness of breath, chest pain not relieved by pain medication, dizziness, fever >101.5, or increased redness or drainage from incisions. To follow up with your primary care doctor - Please follow up with Dr. Calvin Knutson on Thursday, 6/7/18 at 10:30am to review your medications and better control your blood pressure and heart rhythm.

## 2018-05-25 NOTE — DISCHARGE NOTE ADULT - ADDITIONAL INSTRUCTIONS
- Follow up with Dr. Chantel Albarado's office after 1 week. Dr. Albarado will check a repeat CBC to check your blood counts.    - Wound care: You can leave your wound without any dressing. You should wash the wound lightly with soap and water daily. Staples can be removed after post op day 14 (2 weeks after surgery) at Dr. Albarado's office.

## 2018-05-25 NOTE — DISCHARGE NOTE ADULT - MEDICATION SUMMARY - MEDICATIONS TO STOP TAKING
I will STOP taking the medications listed below when I get home from the hospital:    losartan 25 mg oral tablet  -- 1 tab(s) by mouth once a day    Eliquis 2.5 mg oral tablet  -- 1 tab(s) by mouth 2 times a day    metoprolol succinate 25 mg oral tablet, extended release  -- 1 tab(s) by mouth once a day

## 2018-05-25 NOTE — DISCHARGE NOTE ADULT - HOSPITAL COURSE
89 y/o male with PMHx HTN, HLD, BPH, chronic afib (on Eliquis), CAD s/o CABG (LIMA-LAD patent, SVG-OM1 and OM2 occluded and SVG-RCA atretic) in 1999 at [facility], known bifasicular block and chronic systolic and diastolic heart failure EF 30-35%, with severe aortic stenosis, recent NSTEMI with a QI to pRCA and BAV on 4/11/18. ** States he has an inguinal hernia that needs repair and he was here recently for surgery to fix it, but during that admission he was short of breath, and the Aortic Stenosis/CHF was discovered which is why inguinal hernia repair was not performed on that visit.  On 5/22/18 patient went for Trans-femoral TAVR with a 29 Evolut Pro, and is recovering well. He had a stitch placed in the left groin site, which continued to ooze throughout the next day. TTE on 5/23/2018: LVEF 30-35%, trace AI, normal function of THV valve with mean gradient of 7 mmHg and moderate to severe MR. 5/24/18 EKG shows pre-existing RBBB and LAFB.  TVP was removed. Electrophysiology is following.    **************Updated as of 5/25/18 at 6:33am ************ 89 y/o male with PMHx HTN, HLD, BPH, chronic afib (on Eliquis), CAD s/o CABG (LIMA-LAD patent, SVG-OM1 and OM2 occluded and SVG-RCA atretic) in 1999 at [facility], known bifasicular block and chronic systolic and diastolic heart failure EF 30-35%, with severe aortic stenosis, recent NSTEMI with a QI to pRCA and BAV on 4/11/18. ** States he has an inguinal hernia that needs repair and he was here recently for surgery to fix it, but during that admission he was short of breath, and the Aortic Stenosis/CHF was discovered which is why inguinal hernia repair was not performed on that visit.  On 5/22/18 patient went for Trans-femoral TAVR with a 29 Evolut Pro, and is recovering well. He had a stitch placed in the left groin site, which continued to ooze throughout the next day, but slowed down significantly prior to discharge, stitch was removed, no hematoma. TTE on 5/23/2018: LVEF 30-35%, trace AI, normal function of THV valve with mean gradient of 7 mmHg and moderate to severe MR. 5/24/18 EKG shows pre-existing RBBB and LAFB, with no new changes.  TVP was removed on 5/25/18, and patient will be set up with an Event monitor. 88 y/o male with PMHx HTN, HLD, BPH, chronic afib (on Eliquis), CAD s/o CABG (LIMA-LAD patent, SVG-OM1 and OM2 occluded and SVG-RCA atretic) in 1999 at [facility], known bifasicular block and chronic systolic and diastolic heart failure EF 30-35%, with severe aortic stenosis, recent NSTEMI with a QI to pRCA and BAV on 4/11/18. ** States he has an inguinal hernia that needs repair and he was here recently for surgery to fix it, but during that admission he was short of breath, and the Aortic Stenosis/CHF was discovered which is why inguinal hernia repair was not performed on that visit.  On 5/22/18 patient went for Trans-femoral TAVR with a 29 Evolut Pro, and is recovering well. He had a stitch placed in the left groin site, which continued to ooze throughout the next day, but slowed down significantly prior to discharge, stitch was removed, no hematoma. TTE on 5/23/2018: LVEF 30-35%, trace AI, normal function of THV valve with mean gradient of 7 mmHg and moderate to severe MR. 5/24/18 EKG shows pre-existing RBBB and LAFB, with no new changes.  TVP was removed on 5/25/18, and patient was set up with an event monitor inpt and will be discharged home with. Of note, patient will be discharged on dual-antiplatelet therapy (ASA and Plavix), and will not be discharged on Eliquis 2/2 left groin site. Patient will be re-evaluated outpatient with Dr. Cyr at his follow up appointment on 6/4/18, and Dr. Cyr will determine at that time if he wants to change back to Eliquis and Plavix. Patient is otherwise doing well, with no complaints at the time of discharge. Patient denies chest pain, palpitations, SOB, N/V/D, abdominal pain, dizziness, fever or chills. Patient is tolerating PO diet, ambulating, and voiding spontaneously. As per Dr. Cyr and the CT Surgery team, patient is medically stable for discharge. 89 y/o male with PMHx HTN, HLD, BPH, chronic afib (on Eliquis), CAD s/o CABG (LIMA-LAD patent, SVG-OM1 and OM2 occluded and SVG-RCA atretic) in 1999 at [facility], known bifasicular block and chronic systolic and diastolic heart failure EF 30-35%, with severe aortic stenosis, recent NSTEMI with a QI to pRCA and BAV on 4/11/18. ** States he has an inguinal hernia that needs repair and he was here recently for surgery to fix it, but during that admission he was short of breath, and the Aortic Stenosis/CHF was discovered which is why inguinal hernia repair was not performed on that visit.  On 5/22/18 patient went for Trans-femoral TAVR with a 29 Evolut Pro, and is recovering well. He had a stitch placed in the left groin site, which continued to ooze throughout the next day (superficial, trivial), but slowed down significantly prior to discharge, stitch was removed, no hematoma. TTE on 5/23/2018: LVEF 30-35%, trace AI, normal function of THV valve with mean gradient of 7 mmHg and moderate to severe MR. 5/24/18 EKG shows pre-existing RBBB and LAFB, with no new changes.  TVP was removed on 5/25/18, and patient was set up with an event monitor inpt and will be discharged home with. Of note, patient will be discharged on dual-antiplatelet therapy (ASA and Plavix), and will not be discharged on Eliquis 2/2 left groin site. Patient will be re-evaluated outpatient with Dr. Cyr at his follow up appointment on 6/4/18, and Dr. Cyr will switch patient back to Asa/Eliquis. Patient is otherwise doing well, with no complaints at the time of discharge. Patient denies chest pain, palpitations, SOB, N/V/D, abdominal pain, dizziness, fever or chills. Patient is tolerating PO diet, ambulating, and voiding spontaneously. As per Dr. Cyr and the CT Surgery team, patient is medically stable for discharge. Mr. Montano is a 89 yo man with hx of CAD (s/p CABG 1995; QI pRCA 2018), Afib (Eliquis), systolic and diastolic HPrEF (35%), severe aortic stenosis (balloon aortic valvuloplasty and TAVR 2018), GERD, HLD, BPH admitted for post-NSTEMI medical optimization s/p elective bilateral inguinal hernia repair.***    89 y/o male with PMHx HTN, HLD, BPH, chronic afib (on Eliquis), CAD s/o CABG (LIMA-LAD patent, SVG-OM1 and OM2 occluded and SVG-RCA atretic) in 1999 at [facility], known bifasicular block and chronic systolic and diastolic heart failure EF 30-35%, with severe aortic stenosis, recent NSTEMI with a QI to pRCA and BAV on 4/11/18. ** States he has an inguinal hernia that needs repair and he was here recently for surgery to fix it, but during that admission he was short of breath, and the Aortic Stenosis/CHF was discovered which is why inguinal hernia repair was not performed on that visit.  On 5/22/18 patient went for Trans-femoral TAVR with a 29 Evolut Pro, and is recovering well. He had a stitch placed in the left groin site, which continued to ooze throughout the next day (superficial, trivial), but slowed down significantly prior to discharge, stitch was removed, no hematoma. TTE on 5/23/2018: LVEF 30-35%, trace AI, normal function of THV valve with mean gradient of 7 mmHg and moderate to severe MR. 5/24/18 EKG shows pre-existing RBBB and LAFB, with no new changes.  TVP was removed on 5/25/18, and patient was set up with an event monitor inpt and will be discharged home with. Of note, patient will be discharged on dual-antiplatelet therapy (ASA and Plavix), and will not be discharged on Eliquis 2/2 left groin site. Patient will be re-evaluated outpatient with Dr. Cyr at his follow up appointment on 6/4/18, and Dr. Cyr will switch patient back to Asa/Eliquis. Patient is otherwise doing well, with no complaints at the time of discharge. Patient denies chest pain, palpitations, SOB, N/V/D, abdominal pain, dizziness, fever or chills. Patient is tolerating PO diet, ambulating, and voiding spontaneously. As per Dr. Cyr and the CT Surgery team, patient is medically stable for discharge. 88 y/o male with PMHx HTN, HLD, BPH, chronic afib (on Eliquis), CAD s/o CABG (LIMA-LAD patent, SVG-OM1 and OM2 occluded and SVG-RCA atretic) in 1999 at [facility], known bifasicular block and chronic systolic and diastolic heart failure EF 30-35%, with severe aortic stenosis, recent NSTEMI with a QI to pRCA and BAV on 4/11/18. ** States he has an inguinal hernia that needs repair and he was here recently for surgery to fix it, but during that admission he was short of breath, and the Aortic Stenosis/CHF was discovered which is why inguinal hernia repair was not performed on that visit.  On 5/22/18 patient went for Trans-femoral TAVR with a 29 Evolut Pro, and is recovering well. He had a stitch placed in the left groin site, which continued to ooze throughout the next day (superficial, trivial), but slowed down significantly prior to discharge, stitch was removed, no hematoma. TTE on 5/23/2018: LVEF 30-35%, trace AI, normal function of THV valve with mean gradient of 7 mmHg and moderate to severe MR. 5/24/18 EKG shows pre-existing RBBB and LAFB, with no new changes.  TVP was removed on 5/25/18, and patient was set up with an event monitor inpt and will be discharged home with. Of note, patient will be discharged on dual-antiplatelet therapy (ASA and Plavix), and will not be discharged on Eliquis 2/2 left groin site. Patient will be re-evaluated outpatient with Dr. Cyr at his follow up appointment on 6/4/18, and Dr. Cyr will switch patient back to Asa/Eliquis. Patient is otherwise doing well, with no complaints at the time of discharge. Patient denies chest pain, palpitations, SOB, N/V/D, abdominal pain, dizziness, fever or chills. Patient is tolerating PO diet, ambulating, and voiding spontaneously. As per Dr. Cyr and the CT Surgery team, patient is medically stable for discharge.

## 2018-05-25 NOTE — DISCHARGE NOTE ADULT - NS AS ACTIVITY OBS
Stairs allowed/No Heavy lifting/straining/Walking-Indoors allowed/Walking-Outdoors allowed/Showering allowed/Do not make important decisions/Do not drive or operate machinery

## 2018-05-25 NOTE — PROGRESS NOTE ADULT - PROVIDER SPECIALTY LIST ADULT
CT Surgery
CT Surgery
Cardiology
Cardiology
Critical Care
Critical Care
Electrophysiology
Critical Care

## 2018-05-26 VITALS — TEMPERATURE: 97 F

## 2018-05-30 DIAGNOSIS — I50.42 CHRONIC COMBINED SYSTOLIC (CONGESTIVE) AND DIASTOLIC (CONGESTIVE) HEART FAILURE: ICD-10-CM

## 2018-05-30 DIAGNOSIS — I45.2 BIFASCICULAR BLOCK: ICD-10-CM

## 2018-05-30 DIAGNOSIS — I48.2 CHRONIC ATRIAL FIBRILLATION: ICD-10-CM

## 2018-05-30 DIAGNOSIS — I11.0 HYPERTENSIVE HEART DISEASE WITH HEART FAILURE: ICD-10-CM

## 2018-05-30 DIAGNOSIS — Z95.1 PRESENCE OF AORTOCORONARY BYPASS GRAFT: ICD-10-CM

## 2018-05-30 DIAGNOSIS — I35.0 NONRHEUMATIC AORTIC (VALVE) STENOSIS: ICD-10-CM

## 2018-05-30 DIAGNOSIS — I25.2 OLD MYOCARDIAL INFARCTION: ICD-10-CM

## 2018-05-30 DIAGNOSIS — Z00.6 ENCOUNTER FOR EXAMINATION FOR NORMAL COMPARISON AND CONTROL IN CLINICAL RESEARCH PROGRAM: ICD-10-CM

## 2018-05-30 DIAGNOSIS — K40.90 UNILATERAL INGUINAL HERNIA, WITHOUT OBSTRUCTION OR GANGRENE, NOT SPECIFIED AS RECURRENT: ICD-10-CM

## 2018-05-30 DIAGNOSIS — Z79.01 LONG TERM (CURRENT) USE OF ANTICOAGULANTS: ICD-10-CM

## 2018-05-30 DIAGNOSIS — I25.10 ATHEROSCLEROTIC HEART DISEASE OF NATIVE CORONARY ARTERY WITHOUT ANGINA PECTORIS: ICD-10-CM

## 2018-06-02 ENCOUNTER — APPOINTMENT (OUTPATIENT)
Dept: HEART AND VASCULAR | Facility: CLINIC | Age: 83
End: 2018-06-02
Payer: MEDICARE

## 2018-06-02 PROCEDURE — 93229 REMOTE 30 DAY ECG TECH SUPP: CPT

## 2018-06-02 PROCEDURE — 93228 REMOTE 30 DAY ECG REV/REPORT: CPT

## 2018-06-04 ENCOUNTER — APPOINTMENT (OUTPATIENT)
Dept: CARDIOTHORACIC SURGERY | Facility: CLINIC | Age: 83
End: 2018-06-04
Payer: MEDICARE

## 2018-06-04 VITALS
OXYGEN SATURATION: 99 % | DIASTOLIC BLOOD PRESSURE: 67 MMHG | RESPIRATION RATE: 18 BRPM | BODY MASS INDEX: 26.89 KG/M2 | SYSTOLIC BLOOD PRESSURE: 154 MMHG | HEART RATE: 57 BPM | WEIGHT: 147 LBS | TEMPERATURE: 97.8 F

## 2018-06-04 PROCEDURE — 99214 OFFICE O/P EST MOD 30 MIN: CPT | Mod: 24

## 2018-06-05 RX ORDER — POTASSIUM CHLORIDE 750 MG/1
10 CAPSULE, EXTENDED RELEASE ORAL TWICE DAILY
Refills: 0 | Status: ACTIVE | COMMUNITY

## 2018-06-07 ENCOUNTER — APPOINTMENT (OUTPATIENT)
Dept: HEART AND VASCULAR | Facility: CLINIC | Age: 83
End: 2018-06-07
Payer: MEDICARE

## 2018-06-07 VITALS
OXYGEN SATURATION: 96 % | HEIGHT: 61.97 IN | SYSTOLIC BLOOD PRESSURE: 140 MMHG | HEART RATE: 41 BPM | WEIGHT: 143 LBS | TEMPERATURE: 98.1 F | BODY MASS INDEX: 26.31 KG/M2 | DIASTOLIC BLOOD PRESSURE: 68 MMHG

## 2018-06-07 DIAGNOSIS — I48.91 UNSPECIFIED ATRIAL FIBRILLATION: ICD-10-CM

## 2018-06-07 DIAGNOSIS — I45.10 UNSPECIFIED RIGHT BUNDLE-BRANCH BLOCK: ICD-10-CM

## 2018-06-07 DIAGNOSIS — R60.0 LOCALIZED EDEMA: ICD-10-CM

## 2018-06-07 PROCEDURE — 93000 ELECTROCARDIOGRAM COMPLETE: CPT

## 2018-06-07 PROCEDURE — 36415 COLL VENOUS BLD VENIPUNCTURE: CPT

## 2018-06-07 PROCEDURE — 99214 OFFICE O/P EST MOD 30 MIN: CPT | Mod: 25

## 2018-06-07 RX ORDER — PANTOPRAZOLE SODIUM 40 MG/1
40 TABLET, DELAYED RELEASE ORAL DAILY
Qty: 30 | Refills: 1 | Status: DISCONTINUED | COMMUNITY
End: 2018-06-07

## 2018-06-07 RX ORDER — APIXABAN 5 MG/1
5 TABLET, FILM COATED ORAL
Qty: 60 | Refills: 5 | Status: ACTIVE | COMMUNITY
Start: 2018-06-07 | End: 1900-01-01

## 2018-06-14 ENCOUNTER — APPOINTMENT (OUTPATIENT)
Dept: HEART AND VASCULAR | Facility: CLINIC | Age: 83
End: 2018-06-14

## 2018-06-14 ENCOUNTER — APPOINTMENT (OUTPATIENT)
Dept: HEART AND VASCULAR | Facility: CLINIC | Age: 83
End: 2018-06-14
Payer: MEDICARE

## 2018-06-14 VITALS
DIASTOLIC BLOOD PRESSURE: 60 MMHG | SYSTOLIC BLOOD PRESSURE: 160 MMHG | OXYGEN SATURATION: 97 % | HEART RATE: 65 BPM | HEIGHT: 61.97 IN | WEIGHT: 145 LBS | BODY MASS INDEX: 26.68 KG/M2 | TEMPERATURE: 97.7 F

## 2018-06-14 DIAGNOSIS — I10 ESSENTIAL (PRIMARY) HYPERTENSION: ICD-10-CM

## 2018-06-14 PROCEDURE — 93000 ELECTROCARDIOGRAM COMPLETE: CPT

## 2018-06-14 PROCEDURE — 99214 OFFICE O/P EST MOD 30 MIN: CPT

## 2018-06-14 RX ORDER — POLYETHYLENE GLYCOL 3350 17 G/17G
17 POWDER, FOR SOLUTION ORAL
Qty: 255 | Refills: 0 | Status: ACTIVE | COMMUNITY
Start: 2018-05-25

## 2018-06-14 RX ORDER — LINACLOTIDE 145 UG/1
145 CAPSULE, GELATIN COATED ORAL
Qty: 30 | Refills: 0 | Status: ACTIVE | COMMUNITY
Start: 2017-12-19

## 2018-06-14 RX ORDER — ESOMEPRAZOLE MAGNESIUM 40 MG/1
40 CAPSULE, DELAYED RELEASE ORAL
Qty: 30 | Refills: 0 | Status: ACTIVE | COMMUNITY
Start: 2017-12-19

## 2018-06-14 RX ORDER — TOBRAMYCIN AND DEXAMETHASONE 3; 1 MG/ML; MG/ML
0.3-0.1 SUSPENSION/ DROPS OPHTHALMIC
Qty: 5 | Refills: 0 | Status: ACTIVE | COMMUNITY
Start: 2017-12-16

## 2018-06-14 RX ORDER — APIXABAN 2.5 MG/1
2.5 TABLET, FILM COATED ORAL
Qty: 60 | Refills: 0 | Status: ACTIVE | COMMUNITY
Start: 2017-12-26

## 2018-06-14 RX ORDER — NITROGLYCERIN 0.4 MG/1
0.4 TABLET SUBLINGUAL
Qty: 100 | Refills: 0 | Status: ACTIVE | COMMUNITY
Start: 2018-01-02

## 2018-06-14 RX ORDER — HYDRALAZINE HYDROCHLORIDE 10 MG/1
10 TABLET ORAL
Qty: 90 | Refills: 0 | Status: ACTIVE | COMMUNITY
Start: 2018-01-05

## 2018-06-14 RX ORDER — METOPROLOL TARTRATE 25 MG/1
25 TABLET, FILM COATED ORAL
Qty: 60 | Refills: 0 | Status: ACTIVE | COMMUNITY
Start: 2018-03-02

## 2018-06-14 RX ORDER — DICYCLOMINE HYDROCHLORIDE 20 MG/1
20 TABLET ORAL
Qty: 40 | Refills: 0 | Status: ACTIVE | COMMUNITY
Start: 2018-01-19

## 2018-06-14 RX ORDER — SPIRONOLACTONE AND HYDROCHLOROTHIAZIDE 25; 25 MG/1; MG/1
25-25 TABLET, FILM COATED ORAL
Qty: 30 | Refills: 0 | Status: ACTIVE | COMMUNITY
Start: 2017-12-12

## 2018-06-14 RX ORDER — POTASSIUM CHLORIDE 1500 MG/1
20 TABLET, FILM COATED, EXTENDED RELEASE ORAL
Qty: 30 | Refills: 0 | Status: ACTIVE | COMMUNITY
Start: 2018-03-02

## 2018-06-14 RX ORDER — LACTULOSE 10 G/15ML
10 SOLUTION ORAL
Qty: 473 | Refills: 0 | Status: ACTIVE | COMMUNITY
Start: 2018-03-02

## 2018-06-14 RX ORDER — POTASSIUM CHLORIDE 750 MG/1
10 TABLET, FILM COATED, EXTENDED RELEASE ORAL
Qty: 60 | Refills: 0 | Status: ACTIVE | COMMUNITY
Start: 2018-05-25

## 2018-06-14 RX ORDER — OLMESARTAN MEDOXOMIL 40 MG/1
40 TABLET, FILM COATED ORAL
Qty: 30 | Refills: 0 | Status: ACTIVE | COMMUNITY
Start: 2017-12-15

## 2018-06-14 RX ORDER — NIFEDIPINE 60 MG/1
60 TABLET, EXTENDED RELEASE ORAL
Qty: 30 | Refills: 0 | Status: ACTIVE | COMMUNITY
Start: 2017-12-18

## 2018-06-14 RX ORDER — ZOLPIDEM TARTRATE 5 MG/1
5 TABLET ORAL
Qty: 30 | Refills: 0 | Status: ACTIVE | COMMUNITY
Start: 2018-01-02

## 2018-06-14 RX ORDER — LOSARTAN POTASSIUM 100 MG/1
100 TABLET, FILM COATED ORAL DAILY
Qty: 90 | Refills: 2 | Status: ACTIVE | COMMUNITY

## 2018-06-14 RX ORDER — FUROSEMIDE 40 MG/1
40 TABLET ORAL DAILY
Qty: 90 | Refills: 3 | Status: ACTIVE | COMMUNITY
Start: 1900-01-01 | End: 1900-01-01

## 2018-06-14 RX ORDER — CIPROFLOXACIN HYDROCHLORIDE 250 MG/1
250 TABLET, FILM COATED ORAL
Qty: 10 | Refills: 0 | Status: ACTIVE | COMMUNITY
Start: 2018-04-03

## 2018-06-14 RX ORDER — POTASSIUM CHLORIDE 1500 MG/1
20 TABLET, EXTENDED RELEASE ORAL
Qty: 10 | Refills: 0 | Status: ACTIVE | COMMUNITY
Start: 2018-01-16

## 2018-06-14 RX ORDER — SPIRONOLACTONE 25 MG/1
25 TABLET ORAL
Qty: 20 | Refills: 0 | Status: ACTIVE | COMMUNITY
Start: 2018-01-16

## 2018-06-25 ENCOUNTER — APPOINTMENT (OUTPATIENT)
Dept: HEART AND VASCULAR | Facility: CLINIC | Age: 83
End: 2018-06-25

## 2018-07-11 ENCOUNTER — APPOINTMENT (OUTPATIENT)
Dept: HEART AND VASCULAR | Facility: CLINIC | Age: 83
End: 2018-07-11

## 2018-07-17 ENCOUNTER — LABORATORY RESULT (OUTPATIENT)
Age: 83
End: 2018-07-17

## 2018-07-17 ENCOUNTER — APPOINTMENT (OUTPATIENT)
Dept: HEART AND VASCULAR | Facility: CLINIC | Age: 83
End: 2018-07-17
Payer: MEDICARE

## 2018-07-17 VITALS
OXYGEN SATURATION: 98 % | DIASTOLIC BLOOD PRESSURE: 60 MMHG | WEIGHT: 142.99 LBS | HEIGHT: 61.61 IN | TEMPERATURE: 98.4 F | BODY MASS INDEX: 26.65 KG/M2 | SYSTOLIC BLOOD PRESSURE: 130 MMHG | HEART RATE: 75 BPM

## 2018-07-17 DIAGNOSIS — Z78.9 OTHER SPECIFIED HEALTH STATUS: ICD-10-CM

## 2018-07-17 PROBLEM — I35.0 NONRHEUMATIC AORTIC (VALVE) STENOSIS: Chronic | Status: ACTIVE | Noted: 2018-05-22

## 2018-07-17 PROBLEM — E78.00 PURE HYPERCHOLESTEROLEMIA, UNSPECIFIED: Chronic | Status: ACTIVE | Noted: 2018-04-05

## 2018-07-17 PROBLEM — N40.0 BENIGN PROSTATIC HYPERPLASIA WITHOUT LOWER URINARY TRACT SYMPTOMS: Chronic | Status: ACTIVE | Noted: 2018-04-05

## 2018-07-17 PROBLEM — K21.9 GASTRO-ESOPHAGEAL REFLUX DISEASE WITHOUT ESOPHAGITIS: Chronic | Status: ACTIVE | Noted: 2018-04-05

## 2018-07-17 PROCEDURE — 99214 OFFICE O/P EST MOD 30 MIN: CPT

## 2018-07-18 LAB
ANION GAP SERPL CALC-SCNC: 16 MMOL/L
BASOPHILS # BLD AUTO: 0.01 K/UL
BASOPHILS NFR BLD AUTO: 0.2 %
BUN SERPL-MCNC: 16 MG/DL
CALCIUM SERPL-MCNC: 9.5 MG/DL
CHLORIDE SERPL-SCNC: 103 MMOL/L
CO2 SERPL-SCNC: 22 MMOL/L
CREAT SERPL-MCNC: 0.95 MG/DL
EOSINOPHIL # BLD AUTO: 0.03 K/UL
EOSINOPHIL NFR BLD AUTO: 0.7 %
GLUCOSE SERPL-MCNC: 128 MG/DL
HCT VFR BLD CALC: 34 %
HGB BLD-MCNC: 10 G/DL
IMM GRANULOCYTES NFR BLD AUTO: 0.2 %
LYMPHOCYTES # BLD AUTO: 1.21 K/UL
LYMPHOCYTES NFR BLD AUTO: 27.6 %
MAN DIFF?: NORMAL
MCHC RBC-ENTMCNC: 29.4 GM/DL
MCHC RBC-ENTMCNC: 30 PG
MCV RBC AUTO: 102.1 FL
MONOCYTES # BLD AUTO: 0.41 K/UL
MONOCYTES NFR BLD AUTO: 9.3 %
NEUTROPHILS # BLD AUTO: 2.72 K/UL
NEUTROPHILS NFR BLD AUTO: 62 %
PLATELET # BLD AUTO: 211 K/UL
POTASSIUM SERPL-SCNC: 3.8 MMOL/L
RBC # BLD: 3.33 M/UL
RBC # FLD: 14.6 %
SODIUM SERPL-SCNC: 141 MMOL/L
WBC # FLD AUTO: 4.39 K/UL

## 2018-09-10 ENCOUNTER — APPOINTMENT (OUTPATIENT)
Dept: HEART AND VASCULAR | Facility: CLINIC | Age: 83
End: 2018-09-10
Payer: MEDICARE

## 2018-09-10 VITALS
WEIGHT: 143 LBS | OXYGEN SATURATION: 99 % | TEMPERATURE: 98.4 F | HEART RATE: 54 BPM | HEIGHT: 61.22 IN | SYSTOLIC BLOOD PRESSURE: 140 MMHG | DIASTOLIC BLOOD PRESSURE: 72 MMHG | BODY MASS INDEX: 27 KG/M2

## 2018-09-10 PROCEDURE — 99214 OFFICE O/P EST MOD 30 MIN: CPT

## 2018-09-10 PROCEDURE — 93000 ELECTROCARDIOGRAM COMPLETE: CPT

## 2018-10-17 VITALS
HEART RATE: 59 BPM | SYSTOLIC BLOOD PRESSURE: 173 MMHG | TEMPERATURE: 98 F | DIASTOLIC BLOOD PRESSURE: 73 MMHG | RESPIRATION RATE: 15 BRPM | WEIGHT: 148.15 LBS | OXYGEN SATURATION: 100 % | HEIGHT: 65 IN

## 2018-10-17 NOTE — ASU PATIENT PROFILE, ADULT - PMH
Afib    Aortic stenosis    CHF (congestive heart failure)    Enlarged prostate    GERD (gastroesophageal reflux disease)    Hernia    High cholesterol    MI (myocardial infarction)

## 2018-10-18 ENCOUNTER — INPATIENT (INPATIENT)
Facility: HOSPITAL | Age: 83
LOS: 4 days | Discharge: HOME | DRG: 281 | End: 2018-10-23
Attending: INTERNAL MEDICINE | Admitting: INTERNAL MEDICINE
Payer: MEDICARE

## 2018-10-18 DIAGNOSIS — Z98.890 OTHER SPECIFIED POSTPROCEDURAL STATES: Chronic | ICD-10-CM

## 2018-10-18 DIAGNOSIS — Z95.1 PRESENCE OF AORTOCORONARY BYPASS GRAFT: Chronic | ICD-10-CM

## 2018-10-18 DIAGNOSIS — Z53.09 PROCEDURE AND TREATMENT NOT CARRIED OUT BECAUSE OF OTHER CONTRAINDICATION: Chronic | ICD-10-CM

## 2018-10-18 LAB
ALBUMIN SERPL ELPH-MCNC: 4.3 G/DL — SIGNIFICANT CHANGE UP (ref 3.3–5)
ALP SERPL-CCNC: 79 U/L — SIGNIFICANT CHANGE UP (ref 40–120)
ALT FLD-CCNC: 11 U/L — SIGNIFICANT CHANGE UP (ref 10–45)
ANION GAP SERPL CALC-SCNC: 14 MMOL/L — SIGNIFICANT CHANGE UP (ref 5–17)
APTT BLD: 39.7 SEC — HIGH (ref 27.5–37.4)
AST SERPL-CCNC: 19 U/L — SIGNIFICANT CHANGE UP (ref 10–40)
BILIRUB SERPL-MCNC: 0.8 MG/DL — SIGNIFICANT CHANGE UP (ref 0.2–1.2)
BUN SERPL-MCNC: 14 MG/DL — SIGNIFICANT CHANGE UP (ref 7–23)
CALCIUM SERPL-MCNC: 10 MG/DL — SIGNIFICANT CHANGE UP (ref 8.4–10.5)
CHLORIDE SERPL-SCNC: 99 MMOL/L — SIGNIFICANT CHANGE UP (ref 96–108)
CK MB CFR SERPL CALC: 5.4 NG/ML — SIGNIFICANT CHANGE UP (ref 0–6.7)
CK SERPL-CCNC: 159 U/L — SIGNIFICANT CHANGE UP (ref 30–200)
CO2 SERPL-SCNC: 25 MMOL/L — SIGNIFICANT CHANGE UP (ref 22–31)
CREAT SERPL-MCNC: 0.9 MG/DL — SIGNIFICANT CHANGE UP (ref 0.5–1.3)
GLUCOSE BLDC GLUCOMTR-MCNC: 101 MG/DL — HIGH (ref 70–99)
GLUCOSE SERPL-MCNC: 188 MG/DL — HIGH (ref 70–99)
HCT VFR BLD CALC: 34.5 % — LOW (ref 39–50)
HGB BLD-MCNC: 11 G/DL — LOW (ref 13–17)
INR BLD: 1.25 — HIGH (ref 0.88–1.16)
MAGNESIUM SERPL-MCNC: 1.8 MG/DL — SIGNIFICANT CHANGE UP (ref 1.6–2.6)
MCHC RBC-ENTMCNC: 31.1 PG — SIGNIFICANT CHANGE UP (ref 27–34)
MCHC RBC-ENTMCNC: 31.9 G/DL — LOW (ref 32–36)
MCV RBC AUTO: 97.5 FL — SIGNIFICANT CHANGE UP (ref 80–100)
PLATELET # BLD AUTO: 209 K/UL — SIGNIFICANT CHANGE UP (ref 150–400)
POTASSIUM SERPL-MCNC: 3.6 MMOL/L — SIGNIFICANT CHANGE UP (ref 3.5–5.3)
POTASSIUM SERPL-SCNC: 3.6 MMOL/L — SIGNIFICANT CHANGE UP (ref 3.5–5.3)
PROT SERPL-MCNC: 8 G/DL — SIGNIFICANT CHANGE UP (ref 6–8.3)
PROTHROM AB SERPL-ACNC: 13.9 SEC — HIGH (ref 9.8–12.7)
RBC # BLD: 3.54 M/UL — LOW (ref 4.2–5.8)
RBC # FLD: 13.8 % — SIGNIFICANT CHANGE UP (ref 10.3–16.9)
SODIUM SERPL-SCNC: 138 MMOL/L — SIGNIFICANT CHANGE UP (ref 135–145)
TROPONIN T SERPL-MCNC: 0.01 NG/ML — SIGNIFICANT CHANGE UP (ref 0–0.01)
TROPONIN T SERPL-MCNC: 0.05 NG/ML — CRITICAL HIGH (ref 0–0.01)
WBC # BLD: 10.4 K/UL — SIGNIFICANT CHANGE UP (ref 3.8–10.5)
WBC # FLD AUTO: 10.4 K/UL — SIGNIFICANT CHANGE UP (ref 3.8–10.5)

## 2018-10-18 RX ORDER — MORPHINE SULFATE 50 MG/1
2 CAPSULE, EXTENDED RELEASE ORAL ONCE
Qty: 0 | Refills: 0 | Status: DISCONTINUED | OUTPATIENT
Start: 2018-10-18 | End: 2018-10-18

## 2018-10-18 RX ORDER — NITROGLYCERIN 6.5 MG
1 CAPSULE, EXTENDED RELEASE ORAL ONCE
Qty: 0 | Refills: 0 | Status: COMPLETED | OUTPATIENT
Start: 2018-10-18 | End: 2018-10-18

## 2018-10-18 RX ORDER — HEPARIN SODIUM 5000 [USP'U]/ML
5000 INJECTION INTRAVENOUS; SUBCUTANEOUS EVERY 8 HOURS
Qty: 0 | Refills: 0 | Status: DISCONTINUED | OUTPATIENT
Start: 2018-10-18 | End: 2018-10-19

## 2018-10-18 RX ORDER — ASPIRIN/CALCIUM CARB/MAGNESIUM 324 MG
81 TABLET ORAL ONCE
Qty: 0 | Refills: 0 | Status: COMPLETED | OUTPATIENT
Start: 2018-10-18 | End: 2018-10-18

## 2018-10-18 RX ORDER — ACETAMINOPHEN 500 MG
1000 TABLET ORAL ONCE
Qty: 0 | Refills: 0 | Status: COMPLETED | OUTPATIENT
Start: 2018-10-18 | End: 2018-10-18

## 2018-10-18 RX ORDER — ASPIRIN/CALCIUM CARB/MAGNESIUM 324 MG
81 TABLET ORAL DAILY
Qty: 0 | Refills: 0 | Status: DISCONTINUED | OUTPATIENT
Start: 2018-10-18 | End: 2018-10-23

## 2018-10-18 RX ORDER — PANTOPRAZOLE SODIUM 20 MG/1
40 TABLET, DELAYED RELEASE ORAL
Qty: 0 | Refills: 0 | Status: DISCONTINUED | OUTPATIENT
Start: 2018-10-18 | End: 2018-10-23

## 2018-10-18 RX ORDER — HYDRALAZINE HCL 50 MG
10 TABLET ORAL ONCE
Qty: 0 | Refills: 0 | Status: COMPLETED | OUTPATIENT
Start: 2018-10-18 | End: 2018-10-18

## 2018-10-18 RX ORDER — SENNA PLUS 8.6 MG/1
2 TABLET ORAL AT BEDTIME
Qty: 0 | Refills: 0 | Status: DISCONTINUED | OUTPATIENT
Start: 2018-10-18 | End: 2018-10-23

## 2018-10-18 RX ORDER — METOPROLOL TARTRATE 50 MG
5 TABLET ORAL ONCE
Qty: 0 | Refills: 0 | Status: COMPLETED | OUTPATIENT
Start: 2018-10-18 | End: 2018-10-18

## 2018-10-18 RX ORDER — METOPROLOL TARTRATE 50 MG
25 TABLET ORAL DAILY
Qty: 0 | Refills: 0 | Status: DISCONTINUED | OUTPATIENT
Start: 2018-10-18 | End: 2018-10-23

## 2018-10-18 RX ORDER — SODIUM CHLORIDE 9 MG/ML
1000 INJECTION, SOLUTION INTRAVENOUS
Qty: 0 | Refills: 0 | Status: DISCONTINUED | OUTPATIENT
Start: 2018-10-18 | End: 2018-10-18

## 2018-10-18 RX ORDER — HYDROMORPHONE HYDROCHLORIDE 2 MG/ML
0.3 INJECTION INTRAMUSCULAR; INTRAVENOUS; SUBCUTANEOUS
Qty: 0 | Refills: 0 | Status: DISCONTINUED | OUTPATIENT
Start: 2018-10-18 | End: 2018-10-19

## 2018-10-18 RX ORDER — TAMSULOSIN HYDROCHLORIDE 0.4 MG/1
0.4 CAPSULE ORAL AT BEDTIME
Qty: 0 | Refills: 0 | Status: DISCONTINUED | OUTPATIENT
Start: 2018-10-18 | End: 2018-10-23

## 2018-10-18 RX ORDER — ONDANSETRON 8 MG/1
4 TABLET, FILM COATED ORAL EVERY 6 HOURS
Qty: 0 | Refills: 0 | Status: DISCONTINUED | OUTPATIENT
Start: 2018-10-18 | End: 2018-10-23

## 2018-10-18 RX ORDER — DOCUSATE SODIUM 100 MG
100 CAPSULE ORAL THREE TIMES A DAY
Qty: 0 | Refills: 0 | Status: DISCONTINUED | OUTPATIENT
Start: 2018-10-18 | End: 2018-10-23

## 2018-10-18 RX ADMIN — SENNA PLUS 2 TABLET(S): 8.6 TABLET ORAL at 22:33

## 2018-10-18 RX ADMIN — Medication 100 MILLIGRAM(S): at 22:33

## 2018-10-18 RX ADMIN — HYDROMORPHONE HYDROCHLORIDE 0.3 MILLIGRAM(S): 2 INJECTION INTRAMUSCULAR; INTRAVENOUS; SUBCUTANEOUS at 13:00

## 2018-10-18 RX ADMIN — MORPHINE SULFATE 2 MILLIGRAM(S): 50 CAPSULE, EXTENDED RELEASE ORAL at 16:12

## 2018-10-18 RX ADMIN — Medication 1000 MILLIGRAM(S): at 16:11

## 2018-10-18 RX ADMIN — Medication 1 INCH(S): at 15:50

## 2018-10-18 RX ADMIN — MORPHINE SULFATE 2 MILLIGRAM(S): 50 CAPSULE, EXTENDED RELEASE ORAL at 15:55

## 2018-10-18 RX ADMIN — Medication 5 MILLIGRAM(S): at 15:50

## 2018-10-18 RX ADMIN — TAMSULOSIN HYDROCHLORIDE 0.4 MILLIGRAM(S): 0.4 CAPSULE ORAL at 22:33

## 2018-10-18 RX ADMIN — Medication 81 MILLIGRAM(S): at 15:40

## 2018-10-18 RX ADMIN — Medication 10 MILLIGRAM(S): at 11:28

## 2018-10-18 RX ADMIN — PANTOPRAZOLE SODIUM 40 MILLIGRAM(S): 20 TABLET, DELAYED RELEASE ORAL at 14:46

## 2018-10-18 RX ADMIN — Medication 400 MILLIGRAM(S): at 15:07

## 2018-10-18 RX ADMIN — HEPARIN SODIUM 5000 UNIT(S): 5000 INJECTION INTRAVENOUS; SUBCUTANEOUS at 22:33

## 2018-10-18 RX ADMIN — SODIUM CHLORIDE 50 MILLILITER(S): 9 INJECTION, SOLUTION INTRAVENOUS at 11:05

## 2018-10-18 RX ADMIN — Medication 25 MILLIGRAM(S): at 23:11

## 2018-10-18 RX ADMIN — Medication 100 MILLIGRAM(S): at 14:23

## 2018-10-18 RX ADMIN — HYDROMORPHONE HYDROCHLORIDE 0.3 MILLIGRAM(S): 2 INJECTION INTRAMUSCULAR; INTRAVENOUS; SUBCUTANEOUS at 14:46

## 2018-10-18 RX ADMIN — HYDROMORPHONE HYDROCHLORIDE 0.3 MILLIGRAM(S): 2 INJECTION INTRAMUSCULAR; INTRAVENOUS; SUBCUTANEOUS at 14:31

## 2018-10-18 RX ADMIN — HYDROMORPHONE HYDROCHLORIDE 0.3 MILLIGRAM(S): 2 INJECTION INTRAMUSCULAR; INTRAVENOUS; SUBCUTANEOUS at 12:20

## 2018-10-18 NOTE — BRIEF OPERATIVE NOTE - PRE-OP DX
Non-recurrent bilateral inguinal hernia without obstruction or gangrene  10/18/2018    Active  Fortino Diana

## 2018-10-18 NOTE — PROGRESS NOTE ADULT - SUBJECTIVE AND OBJECTIVE BOX
Team 4 Surgery Post-Op Note, PCN:     Pre-Op Dx: Bilateral hernias  Procedure: Inguinal hernia repair, bilateral    Surgeon:     Subjective:  Reports minimal pain in his abdomen; otherwise no acute complaints; has passed urine    Vital Signs Last 24 Hrs  T(C): 36.9 (18 Oct 2018 10:44), Max: 36.9 (18 Oct 2018 10:44)  T(F): 98.5 (18 Oct 2018 10:44), Max: 98.5 (18 Oct 2018 10:44)  HR: 86 (18 Oct 2018 13:48) (58 - 86)  BP: 146/50 (18 Oct 2018 13:48) (145/63 - 187/59)  BP(mean): 68 (18 Oct 2018 12:44) (68 - 97)  RR: 19 (18 Oct 2018 13:48) (17 - 21)  SpO2: 100% (18 Oct 2018 13:48) (96% - 100%)    Physical Exam:  General: NAD, resting comfortably in bed  Pulmonary: Nonlabored breathing, no respiratory distress  Abdominal: soft, appropriately tender, non-distended; incision c/d/i, dermabond in place  Extremities: WWP, normal strength  Neuro: A/O x 3,    PT/INR - ( 18 Oct 2018 07:31 )   PT: 13.9 sec;   INR: 1.25     PTT - ( 18 Oct 2018 07:31 )  PTT:39.7 sec    CAPILLARY BLOOD GLUCOSE  POCT Blood Glucose.: 101 mg/dL (18 Oct 2018 07:14)    ABO Interpretation: A (10-18 @ 07:31)    Assessment:89y Male s/p above procedure    Plan:  Pain/nausea control PRN  Home meds  Incentive spirometer/OOB/Ambulate  CLD, IVF; ADAT  Passed TOV  SQH at 6PM

## 2018-10-18 NOTE — CONSULT NOTE ADULT - SUBJECTIVE AND OBJECTIVE BOX
Patient is a 89y old  Male who presents with a chief complaint of     HPI:  89 Y/O M W PMHX of HTN, HLD, BPH, chronic A. FIB (on eliquis), CAD s/p CABG (LIMA-LAD patent, SVG-OM1 and OM2 occluded and SVF-RCA atretic) 20 years ago in [facility], RBBB and LAFB and chronic systolic and diastolic heart failure (30%) w severe aortic stenosis with recent admission for NTEMI and acute on chronic heart failure s/p QI of pRCA with balloon aortic valvuloplasty on 4/11/2018 followed by TAVR on 5/22/2018 using Corevalve Evolute Pro known to Dr. Vo recently admitted for elective bilateral inguinal hernia repair.     No intra-op complications.   Cardiac risk stratification was performed by an OSH attending - records in chart.   Patient notes he can walk 8 blocks at baseline. He saw Dr. Vo recently: his cardiac meds are Benicar 40, Eliquis Lasix 40 mg daily, hydralazine 10 mg TID, kaeorim36 mg, losartan 100 mg, metoprolol 25 daily, nifedipine 60 mg ER, nitro SL, spironolactone/ HCTz.     On further history using the  phone, he told me that he has been recently treated for swollen ankles prior to this admission. He is unsure where. He can lie flat at night and is not short of breath. When I saw him, he was no longer in chest pain s/p nitro patch. Recently he used to get chest pain upon walking a few blocks which goes away at rest. He has not had any recent ECHO or stress tests that he can recall. Non-smoker.     PAST MEDICAL & SURGICAL HISTORY:  Hernia  Aortic stenosis  Enlarged prostate  High cholesterol  GERD (gastroesophageal reflux disease)  MI (myocardial infarction)  CHF (congestive heart failure)  Afib  Contraindication to percutaneous coronary intervention (PCI): RCA  S/P balloon aortic valvuloplasty  S/P CABG (coronary artery bypass graft): 19 years ago @ [place]      FAMILY HISTORY:  No pertinent family history in first degree relatives      Allergies    No Known Allergies    Intolerances        MEDICATIONS  (STANDING):  aspirin enteric coated 81 milliGRAM(s) Oral daily  docusate sodium 100 milliGRAM(s) Oral three times a day  heparin  Injectable 5000 Unit(s) SubCutaneous every 8 hours  pantoprazole    Tablet 40 milliGRAM(s) Oral before breakfast  senna 2 Tablet(s) Oral at bedtime  tamsulosin 0.4 milliGRAM(s) Oral at bedtime    MEDICATIONS  (PRN):  HYDROmorphone  Injectable 0.3 milliGRAM(s) IV Push every 1 hour PRN Severe Pain (7 - 10)  ondansetron Injectable 4 milliGRAM(s) IV Push every 6 hours PRN Nausea      REVIEW OF SYSTEMS:  12 point ROS negative except for that stated in the HPI    PHYSICAL EXAM:  Vitals past 24 Hours: T(C): 37.3 (10-18-18 @ 17:14), Max: 37.3 (10-18-18 @ 17:14)  HR: 66 (10-18-18 @ 18:14) (54 - 86)  BP: 148/55 (10-18-18 @ 18:14) (125/47 - 187/59)  RR: 20 (10-18-18 @ 18:14) (13 - 25)  SpO2: 95% (10-18-18 @ 18:14) (95% - 100%)	    Daily     Daily     GEN: Alert and awake, no acute distress  HEENT: Moist mucous membranes  Neck: JVD below clavicle   Cardiovascular: Regular rate and rhythm, +S1 S2. No murmurs, rubs, or gallops appreciated  Respiratory: Lungs clear to auscultation bilaterally  Extremities: No clubbing, cyanosis or edema. Warm, well-perfused extremities.   Vascular: Radial and dorsalis pedis pulses 2+ bilaterally    I&O's Detail    18 Oct 2018 07:01  -  18 Oct 2018 19:06  --------------------------------------------------------  IN:    lactated ringers.: 310 mL    Oral Fluid: 240 mL    Other: 500 mL  Total IN: 1050 mL    OUT:    Other: 50 mL    Voided: 400 mL  Total OUT: 450 mL    Total NET: 600 mL            LABS:                        11.0   10.4  )-----------( 209      ( 18 Oct 2018 16:23 )             34.5     10-18    138  |  99  |  14  ----------------------------<  188<H>  3.6   |  25  |  0.90    Ca    10.0      18 Oct 2018 16:23  Mg     1.8     10-18    TPro  8.0  /  Alb  4.3  /  TBili  0.8  /  DBili  x   /  AST  19  /  ALT  11  /  AlkPhos  79  10-18    CARDIAC MARKERS ( 18 Oct 2018 16:23 )  x     / 0.01 ng/mL / x     / x     / x          PT/INR - ( 18 Oct 2018 07:31 )   PT: 13.9 sec;   INR: 1.25          PTT - ( 18 Oct 2018 07:31 )  PTT:39.7 sec    I&O's Summary    18 Oct 2018 07:01  -  18 Oct 2018 19:06  --------------------------------------------------------  IN: 1050 mL / OUT: 450 mL / NET: 600 mL        CARDIAC DIAGNOSTIC TESTING:    ECG: A. fib, RBBB, unchanged from pre-op     ECHO: none on file       ASSESSMENT/PLAN:    89 Y/O M W PMHX of HTN, HLD, BPH, chronic A. FIB (on eliquis), CAD s/p CABG (LIMA-LAD patent, SVG-OM1 and OM2 occluded and SVF-RCA atretic) 20 years ago in [facility], RBBB and LAFB and chronic systolic and diastolic heart failure (30%) w severe aortic stenosis with recent admission for NTEMI and acute on chronic heart failure s/p QI of pRCA with balloon aortic valvuloplasty on 4/11/2018 followed by TAVR on 5/22/2018 using Corevalve Evolute Pro known to Dr. Vo recently admitted for elective bilateral inguinal hernia repair.     Chest pain   Concern for ACS, chest pain responded to nitro   - first troponin is negative, if second troponin is negative and no further chest pain patient can follow up with outpatient cardiologist   - if second trop is positive, he will need further trending until peak, heparin drip for ACS protocol and a cardiac cath in the AM along with an ECHO in the AM     A. fib   - Currently rate controlled   - resume eliquis when deemed appropriate from a surgical point of view   - resume home metoprolol dose (seems to be 25 mg daily per Dr. Vo's note - please confirm with patient's son)     Please call us if he has chest pain again     Thank you for the consult   Will continue to follow

## 2018-10-18 NOTE — BRIEF OPERATIVE NOTE - POST-OP DX
Incarcerated inguinal hernia, bilateral  10/18/2018  Left pantaloon, right direct inguinal hernia, chronically incarcerated  Active  Fortino Diana

## 2018-10-18 NOTE — BRIEF OPERATIVE NOTE - PROCEDURE
<<-----Click on this checkbox to enter Procedure Inguinal hernia repair, bilateral  10/18/2018  Modified lichenstein bilaterally  Active  [id number]

## 2018-10-18 NOTE — BRIEF OPERATIVE NOTE - OPERATION/FINDINGS
Bilateral inguinal hernia repair with mesh, left with pantaloon hernia, right with direct hernia, bilaterally incarcerated, Floor approximated bilaterally and repaired over mesh.

## 2018-10-19 DIAGNOSIS — Z91.89 OTHER SPECIFIED PERSONAL RISK FACTORS, NOT ELSEWHERE CLASSIFIED: ICD-10-CM

## 2018-10-19 DIAGNOSIS — N40.0 BENIGN PROSTATIC HYPERPLASIA WITHOUT LOWER URINARY TRACT SYMPTOMS: ICD-10-CM

## 2018-10-19 DIAGNOSIS — E78.5 HYPERLIPIDEMIA, UNSPECIFIED: ICD-10-CM

## 2018-10-19 DIAGNOSIS — I25.10 ATHEROSCLEROTIC HEART DISEASE OF NATIVE CORONARY ARTERY WITHOUT ANGINA PECTORIS: ICD-10-CM

## 2018-10-19 DIAGNOSIS — I50.9 HEART FAILURE, UNSPECIFIED: ICD-10-CM

## 2018-10-19 DIAGNOSIS — I35.0 NONRHEUMATIC AORTIC (VALVE) STENOSIS: ICD-10-CM

## 2018-10-19 DIAGNOSIS — I48.91 UNSPECIFIED ATRIAL FIBRILLATION: ICD-10-CM

## 2018-10-19 DIAGNOSIS — K21.9 GASTRO-ESOPHAGEAL REFLUX DISEASE WITHOUT ESOPHAGITIS: ICD-10-CM

## 2018-10-19 DIAGNOSIS — R63.8 OTHER SYMPTOMS AND SIGNS CONCERNING FOOD AND FLUID INTAKE: ICD-10-CM

## 2018-10-19 LAB
ALBUMIN SERPL ELPH-MCNC: 4 G/DL — SIGNIFICANT CHANGE UP (ref 3.3–5)
ALP SERPL-CCNC: 67 U/L — SIGNIFICANT CHANGE UP (ref 40–120)
ALT FLD-CCNC: 10 U/L — SIGNIFICANT CHANGE UP (ref 10–45)
ANION GAP SERPL CALC-SCNC: 12 MMOL/L — SIGNIFICANT CHANGE UP (ref 5–17)
APTT BLD: 129.5 SEC — CRITICAL HIGH (ref 27.5–37.4)
APTT BLD: 35 SEC — SIGNIFICANT CHANGE UP (ref 27.5–37.4)
APTT BLD: 65.8 SEC — HIGH (ref 27.5–37.4)
AST SERPL-CCNC: 23 U/L — SIGNIFICANT CHANGE UP (ref 10–40)
BILIRUB SERPL-MCNC: 1.3 MG/DL — HIGH (ref 0.2–1.2)
BUN SERPL-MCNC: 16 MG/DL — SIGNIFICANT CHANGE UP (ref 7–23)
CALCIUM SERPL-MCNC: 10.1 MG/DL — SIGNIFICANT CHANGE UP (ref 8.4–10.5)
CHLORIDE SERPL-SCNC: 101 MMOL/L — SIGNIFICANT CHANGE UP (ref 96–108)
CK MB CFR SERPL CALC: 3.2 NG/ML — SIGNIFICANT CHANGE UP (ref 0–6.7)
CK MB CFR SERPL CALC: 5 NG/ML — SIGNIFICANT CHANGE UP (ref 0–6.7)
CK SERPL-CCNC: 203 U/L — HIGH (ref 30–200)
CK SERPL-CCNC: 216 U/L — HIGH (ref 30–200)
CO2 SERPL-SCNC: 26 MMOL/L — SIGNIFICANT CHANGE UP (ref 22–31)
CREAT SERPL-MCNC: 1.05 MG/DL — SIGNIFICANT CHANGE UP (ref 0.5–1.3)
GLUCOSE SERPL-MCNC: 114 MG/DL — HIGH (ref 70–99)
HCT VFR BLD CALC: 30.6 % — LOW (ref 39–50)
HGB BLD-MCNC: 9.9 G/DL — LOW (ref 13–17)
INR BLD: 1.32 — HIGH (ref 0.88–1.16)
MAGNESIUM SERPL-MCNC: 1.9 MG/DL — SIGNIFICANT CHANGE UP (ref 1.6–2.6)
MCHC RBC-ENTMCNC: 30.9 PG — SIGNIFICANT CHANGE UP (ref 27–34)
MCHC RBC-ENTMCNC: 32.4 G/DL — SIGNIFICANT CHANGE UP (ref 32–36)
MCV RBC AUTO: 95.6 FL — SIGNIFICANT CHANGE UP (ref 80–100)
PHOSPHATE SERPL-MCNC: 2.5 MG/DL — SIGNIFICANT CHANGE UP (ref 2.5–4.5)
PLATELET # BLD AUTO: 199 K/UL — SIGNIFICANT CHANGE UP (ref 150–400)
POTASSIUM SERPL-MCNC: 3.6 MMOL/L — SIGNIFICANT CHANGE UP (ref 3.5–5.3)
POTASSIUM SERPL-SCNC: 3.6 MMOL/L — SIGNIFICANT CHANGE UP (ref 3.5–5.3)
PROT SERPL-MCNC: 7.5 G/DL — SIGNIFICANT CHANGE UP (ref 6–8.3)
PROTHROM AB SERPL-ACNC: 14.7 SEC — HIGH (ref 9.8–12.7)
RBC # BLD: 3.2 M/UL — LOW (ref 4.2–5.8)
RBC # FLD: 14.2 % — SIGNIFICANT CHANGE UP (ref 10.3–16.9)
SODIUM SERPL-SCNC: 139 MMOL/L — SIGNIFICANT CHANGE UP (ref 135–145)
TROPONIN T SERPL-MCNC: 0.1 NG/ML — CRITICAL HIGH (ref 0–0.01)
TROPONIN T SERPL-MCNC: 0.15 NG/ML — CRITICAL HIGH (ref 0–0.01)
TROPONIN T SERPL-MCNC: 0.15 NG/ML — CRITICAL HIGH (ref 0–0.01)
WBC # BLD: 7.5 K/UL — SIGNIFICANT CHANGE UP (ref 3.8–10.5)
WBC # FLD AUTO: 7.5 K/UL — SIGNIFICANT CHANGE UP (ref 3.8–10.5)

## 2018-10-19 PROCEDURE — 93010 ELECTROCARDIOGRAM REPORT: CPT | Mod: 76

## 2018-10-19 PROCEDURE — 99291 CRITICAL CARE FIRST HOUR: CPT

## 2018-10-19 RX ORDER — MAGNESIUM SULFATE 500 MG/ML
1 VIAL (ML) INJECTION ONCE
Qty: 0 | Refills: 0 | Status: COMPLETED | OUTPATIENT
Start: 2018-10-19 | End: 2018-10-19

## 2018-10-19 RX ORDER — POTASSIUM PHOSPHATE, MONOBASIC POTASSIUM PHOSPHATE, DIBASIC 236; 224 MG/ML; MG/ML
15 INJECTION, SOLUTION INTRAVENOUS ONCE
Qty: 0 | Refills: 0 | Status: COMPLETED | OUTPATIENT
Start: 2018-10-19 | End: 2018-10-19

## 2018-10-19 RX ORDER — CLOPIDOGREL BISULFATE 75 MG/1
75 TABLET, FILM COATED ORAL DAILY
Qty: 0 | Refills: 0 | Status: DISCONTINUED | OUTPATIENT
Start: 2018-10-19 | End: 2018-10-23

## 2018-10-19 RX ORDER — HEPARIN SODIUM 5000 [USP'U]/ML
1200 INJECTION INTRAVENOUS; SUBCUTANEOUS
Qty: 25000 | Refills: 0 | Status: DISCONTINUED | OUTPATIENT
Start: 2018-10-19 | End: 2018-10-19

## 2018-10-19 RX ORDER — ATORVASTATIN CALCIUM 80 MG/1
20 TABLET, FILM COATED ORAL AT BEDTIME
Qty: 0 | Refills: 0 | Status: DISCONTINUED | OUTPATIENT
Start: 2018-10-19 | End: 2018-10-23

## 2018-10-19 RX ORDER — ACETAMINOPHEN 500 MG
1000 TABLET ORAL ONCE
Qty: 0 | Refills: 0 | Status: COMPLETED | OUTPATIENT
Start: 2018-10-19 | End: 2018-10-19

## 2018-10-19 RX ORDER — HYDROMORPHONE HYDROCHLORIDE 2 MG/ML
2 INJECTION INTRAMUSCULAR; INTRAVENOUS; SUBCUTANEOUS ONCE
Qty: 0 | Refills: 0 | Status: DISCONTINUED | OUTPATIENT
Start: 2018-10-19 | End: 2018-10-19

## 2018-10-19 RX ORDER — HEPARIN SODIUM 5000 [USP'U]/ML
1000 INJECTION INTRAVENOUS; SUBCUTANEOUS
Qty: 25000 | Refills: 0 | Status: DISCONTINUED | OUTPATIENT
Start: 2018-10-19 | End: 2018-10-20

## 2018-10-19 RX ADMIN — Medication 100 MILLIGRAM(S): at 06:48

## 2018-10-19 RX ADMIN — TAMSULOSIN HYDROCHLORIDE 0.4 MILLIGRAM(S): 0.4 CAPSULE ORAL at 22:15

## 2018-10-19 RX ADMIN — Medication 1000 MILLIGRAM(S): at 14:30

## 2018-10-19 RX ADMIN — Medication 81 MILLIGRAM(S): at 13:49

## 2018-10-19 RX ADMIN — PANTOPRAZOLE SODIUM 40 MILLIGRAM(S): 20 TABLET, DELAYED RELEASE ORAL at 06:48

## 2018-10-19 RX ADMIN — Medication 100 GRAM(S): at 09:01

## 2018-10-19 RX ADMIN — Medication 1 INCH(S): at 03:00

## 2018-10-19 RX ADMIN — HEPARIN SODIUM 12 UNIT(S)/HR: 5000 INJECTION INTRAVENOUS; SUBCUTANEOUS at 09:42

## 2018-10-19 RX ADMIN — Medication 100 MILLIGRAM(S): at 13:48

## 2018-10-19 RX ADMIN — ATORVASTATIN CALCIUM 20 MILLIGRAM(S): 80 TABLET, FILM COATED ORAL at 22:15

## 2018-10-19 RX ADMIN — HYDROMORPHONE HYDROCHLORIDE 2 MILLIGRAM(S): 2 INJECTION INTRAMUSCULAR; INTRAVENOUS; SUBCUTANEOUS at 22:15

## 2018-10-19 RX ADMIN — POTASSIUM PHOSPHATE, MONOBASIC POTASSIUM PHOSPHATE, DIBASIC 62.5 MILLIMOLE(S): 236; 224 INJECTION, SOLUTION INTRAVENOUS at 15:27

## 2018-10-19 RX ADMIN — Medication 400 MILLIGRAM(S): at 14:16

## 2018-10-19 RX ADMIN — HEPARIN SODIUM 10 UNIT(S)/HR: 5000 INJECTION INTRAVENOUS; SUBCUTANEOUS at 17:36

## 2018-10-19 RX ADMIN — CLOPIDOGREL BISULFATE 75 MILLIGRAM(S): 75 TABLET, FILM COATED ORAL at 13:49

## 2018-10-19 RX ADMIN — HEPARIN SODIUM 5000 UNIT(S): 5000 INJECTION INTRAVENOUS; SUBCUTANEOUS at 06:48

## 2018-10-19 NOTE — PROGRESS NOTE ADULT - PROBLEM SELECTOR PLAN 1
- post-op chest pain with troponins elevated (peaked)  - Continue heparin ggt, ASA, Plavix for post NSTEMI management  - Continue metoprolol  - F/u echo

## 2018-10-19 NOTE — PROGRESS NOTE ADULT - ASSESSMENT
Mr. 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

## 2018-10-19 NOTE — PROVIDER CONTACT NOTE (CRITICAL VALUE NOTIFICATION) - ACTION/TREATMENT ORDERED:
As per Branden, pause Heparin drip for one hour and restart at new rate.
Metoprolol ordered. Lab re-draws ordered for 1AM.

## 2018-10-19 NOTE — PROGRESS NOTE ADULT - ATTENDING COMMENTS
Assessment: Patient personally seen and examined myself during rounds with the Physician Assistant/House Staff/Nurse Practitioner   ON DATE 10/19/18  Physician Assistant/House Staff/Nurse Practitioner note read, including vitals, physical findings, laboratory data, and radiological reports.   Revisions included below.   Direct personal management at bed side and extensive interpretation of the data.    Plan was outlined and discussed in details with the Physician Assistant/House Staff/Nurse Practitioner.    Decision making of high complexity   Risk high of complications, morbidity, and/or mortality  Assessment and Action taken for acute disease activity to reflect the level of care provided:  -Hemodynamic evaluation and support  -Medication reconciliation  -Review laboratory data  -EKG reviewed   -Echo reviewed   -ACS assessment and treatment as applicable  -Heart failure assessment and treatment as applicable  -Cardiac Telemetry reviewed  -Interdisciplinary discussion with IC / EP / HF / CTS teams as needed  TIME SPENT in evaluation and management, reassessments, review and interpretation of labs and x-rays, and hemodynamic management, formulating a plan and coordinating care: ___50____ MIN.  Time does not include procedural time.    Calvin Knutson MD  Cardiology     Mobile: [phone]  Office: [phone]  [address] [place], [place] [address] Assessment: Patient personally seen and examined myself during rounds with the House Staff/Fellow  ON DATE 10/19/18  House Staff/Fellow note read, including vitals, physical findings, laboratory data, and radiological reports.   Revisions included below.   Direct personal management at bed side and extensive interpretation of the data.    Plan was outlined and discussed in details with the House Staff/Fellow.    Decision making of high complexity   Risk high of complications, morbidity, and/or mortality  Assessment and Action taken for acute disease activity to reflect the level of care provided:  -Hemodynamic evaluation and support  -ACS assessment and treatment as applicable  -Heart failure assessment and treatment as applicable  -Cardiac Telemetry reviewed  -Medication reconciliation  -Review laboratory data  -EKG reviewed   -Echo reviewed  -Interdisciplinary discussion with IC / EP / HF / CTS teams as needed  My plan includes :  close hemodynamic monitoring and management   Monitor for arrhythmias and monitor parameters for organ perfusion  monitor neurologic status  Head of the bed should remain elevated to 45 deg .   chest PT and IS will be encouraged  monitor adequacy of oxygenation and ventilation and attempt to wean oxygen  Nutritional goals will be met using po eventually , ensure adequate caloric intake and montior the same  Stress ulcer and VTE prophylaxis will be achieved    Glycemic control is satisfactory  Electrolytes have been replete as necessary and wound care has been carried out. Pain control has been achieved.   aggressive physical therapy and early mobility and ambulation goals will be met   The family was updated about the course and plan  CRITICAL CARE TIME SPENT in evaluation and management, reassessments, review and interpretation of labs and x-rays, ventilator and hemodynamic management, formulating a plan and coordinating care: ___90____ MIN.  Time does not include procedural time.    Calvin Knutson MD  CCU ATTENDING  Mobile: [phone]

## 2018-10-19 NOTE — PROGRESS NOTE ADULT - ASSESSMENT
89yo M with CAD, A-fib (on Eliquis), CHF, GERD, HLD, BPH, s/p bilateral open inguinal hernia repair (10/18) complicated by post operative chest pain.    - Pain/nausea PRN  - echo  - CLD - advance post echo  - Metoprolol 25mg QD  - Colace/Senna  - Pantoprazole  -Trend EKG/troponins  -Transfer to Cardiology

## 2018-10-19 NOTE — PROGRESS NOTE ADULT - SUBJECTIVE AND OBJECTIVE BOX
S: Patient examined bedside. Denies chest pain this morning. Denies SOB. Admits to some pain in b/l groins. Tolerating CLD. Admits to passing regular flatus, but denies BMs.     O:     Vital Signs Last 24 Hrs  T(C): 37.3 (19 Oct 2018 09:42), Max: 37.8 (19 Oct 2018 01:12)  T(F): 99.1 (19 Oct 2018 09:42), Max: 100.1 (19 Oct 2018 01:12)  HR: 56 (19 Oct 2018 09:00) (54 - 86)  BP: 132/63 (19 Oct 2018 09:00) (120/91 - 187/59)  BP(mean): 102 (19 Oct 2018 04:00) (59 - 102)  RR: 17 (19 Oct 2018 09:00) (13 - 25)  SpO2: 97% (19 Oct 2018 09:00) (95% - 100%)  I&O's Summary    18 Oct 2018 07:01  -  19 Oct 2018 07:00  --------------------------------------------------------  IN: 1050 mL / OUT: 450 mL / NET: 600 mL    19 Oct 2018 07:01  -  19 Oct 2018 10:04  --------------------------------------------------------  IN: 100 mL / OUT: 0 mL / NET: 100 mL        Physical Exam:  General: NAD, resting comfortably in bed  Neuro: A/O x 3, no focal deficits  Pulmonary: Nonlabored breathing, no respiratory distress  Abdominal: soft, appropriately tender, non-distended; incision c/d/i, dermabond in place  Extremities: WWP, normal strength                            9.9    7.5   )-----------( 199      ( 19 Oct 2018 06:45 )             30.6   10-19    139  |  101  |  16  ----------------------------<  114<H>  3.6   |  26  |  1.05    Ca    10.1      19 Oct 2018 06:45  Phos  2.5     10-19  Mg     1.9     10-19    TPro  7.5  /  Alb  4.0  /  TBili  1.3<H>  /  DBili  x   /  AST  23  /  ALT  10  /  AlkPhos  67  10-19  LIVER FUNCTIONS - ( 19 Oct 2018 06:45 )  Alb: 4.0 g/dL / Pro: 7.5 g/dL / ALK PHOS: 67 U/L / ALT: 10 U/L / AST: 23 U/L / GGT: x         CARDIAC MARKERS ( 19 Oct 2018 06:45 )  x     / 0.15 ng/mL / 216 U/L / x     / 5.0 ng/mL  CARDIAC MARKERS ( 19 Oct 2018 01:06 )  x     / 0.10 ng/mL / x     / x     / x      CARDIAC MARKERS ( 18 Oct 2018 21:01 )  x     / 0.05 ng/mL / 159 U/L / x     / 5.4 ng/mL  CARDIAC MARKERS ( 18 Oct 2018 16:23 )  x     / 0.01 ng/mL / x     / x     / x

## 2018-10-19 NOTE — PROGRESS NOTE ADULT - SUBJECTIVE AND OBJECTIVE BOX
NOTE INCOMPLETE****TRANSFER ACCEPT NOTE TO [address] [place] [address] Course:    Mr. 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 Patient was given lopressor 5mg, ASA 81, NG, and morphine with improvement. EKG remained stable afib as prior without ST segment changes. Patient had uptrending troponins that peaked at 0.15. Patient was restarted on home beta blocker and started on Plavix and heparin ggt, then transferred to cardiology for post NSTEMI care.    SUBJECTIVE / INTERVAL HPI: Patient seen and examined at bedside.     VITAL SIGNS:  Vital Signs Last 24 Hrs  T(C): 37.4 (19 Oct 2018 13:48), Max: 37.8 (19 Oct 2018 01:12)  T(F): 99.3 (19 Oct 2018 13:48), Max: 100.1 (19 Oct 2018 01:12)  HR: 68 (19 Oct 2018 13:04) (54 - 78)  BP: 166/71 (19 Oct 2018 13:04) (120/91 - 166/71)  BP(mean): 102 (19 Oct 2018 04:00) (59 - 102)  RR: 17 (19 Oct 2018 13:04) (13 - 25)  SpO2: 97% (19 Oct 2018 13:04) (95% - 100%)    PHYSICAL EXAM:    General: Well developed, well nourished, no acute distress  HEENT: NC/AT; PERRL, anicteric sclera; MMM  Neck: supple  Cardiovascular: +S1/S2, RRR, no murmurs, rubs, gallops  Respiratory: CTA B/L; no W/R/R  Gastrointestinal: soft, NT/ND; +BSx4  Extremities: WWP; no edema, clubbing or cyanosis  Vascular: 2+ radial, DP/PT pulses B/L  Neurological: AAOx3; no focal deficits    MEDICATIONS:  MEDICATIONS  (STANDING):  aspirin enteric coated 81 milliGRAM(s) Oral daily  clopidogrel Tablet 75 milliGRAM(s) Oral daily  docusate sodium 100 milliGRAM(s) Oral three times a day  heparin  Infusion 1200 Unit(s)/Hr (12 mL/Hr) IV Continuous <Continuous>  metoprolol succinate ER 25 milliGRAM(s) Oral daily  pantoprazole    Tablet 40 milliGRAM(s) Oral before breakfast  potassium phosphate IVPB 15 milliMole(s) IV Intermittent once  senna 2 Tablet(s) Oral at bedtime  tamsulosin 0.4 milliGRAM(s) Oral at bedtime    MEDICATIONS  (PRN):  ondansetron Injectable 4 milliGRAM(s) IV Push every 6 hours PRN Nausea      ALLERGIES:  Allergies    No Known Allergies    Intolerances        LABS:                        9.9    7.5   )-----------( 199      ( 19 Oct 2018 06:45 )             30.6     10-19    139  |  101  |  16  ----------------------------<  114<H>  3.6   |  26  |  1.05    Ca    10.1      19 Oct 2018 06:45  Phos  2.5     10-19  Mg     1.9     10-19    TPro  7.5  /  Alb  4.0  /  TBili  1.3<H>  /  DBili  x   /  AST  23  /  ALT  10  /  AlkPhos  67  10-19    PT/INR - ( 19 Oct 2018 06:45 )   PT: 14.7 sec;   INR: 1.32          PTT - ( 19 Oct 2018 06:45 )  PTT:35.0 sec    POCT Blood Glucose.: 101 mg/dL (18 Oct 2018 07:14)    RADIOLOGY & ADDITIONAL TESTS: Reviewed.    ECG: A. fib, RBBB TRANSFER ACCEPT NOTE TO [address] [place] [address] Course:    Mr. 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 Patient was given lopressor 5mg, ASA 81, NG, and morphine with improvement. EKG remained stable afib as prior without ST segment changes. Patient had uptrending troponins that peaked at 0.15. Patient was restarted on home beta blocker and started on Plavix and heparin ggt, then transferred to cardiology for post NSTEMI care.    SUBJECTIVE / INTERVAL HPI: Patient seen and examined at bedside.  [id number] used. Patient reports resolution of chest pain. He reports mild discomfort at site of inguinal hernia repair. He denies abd pain, shortness of breath, dizziness or lightheadedness.    VITAL SIGNS:  Vital Signs Last 24 Hrs  T(C): 37.4 (19 Oct 2018 13:48), Max: 37.8 (19 Oct 2018 01:12)  T(F): 99.3 (19 Oct 2018 13:48), Max: 100.1 (19 Oct 2018 01:12)  HR: 68 (19 Oct 2018 13:04) (54 - 78)  BP: 166/71 (19 Oct 2018 13:04) (120/91 - 166/71)  BP(mean): 102 (19 Oct 2018 04:00) (59 - 102)  RR: 17 (19 Oct 2018 13:04) (13 - 25)  SpO2: 97% (19 Oct 2018 13:04) (95% - 100%)    PHYSICAL EXAM:    General: Well developed, well nourished, no acute distress  HEENT: NC/AT; PERRL, anicteric sclera; MMM  Neck: supple  Cardiovascular: +S1/S2, irregularly irregular, no murmurs, rubs, gallops  Respiratory: CTA B/L; no W/R/R  Gastrointestinal: soft, NT/ND; +BSx4  Extremities: WWP; no edema, clubbing or cyanosis  Vascular: 2+ radial and pedal pulses  Neurological: AAOx3; no focal deficits    MEDICATIONS:  MEDICATIONS  (STANDING):  aspirin enteric coated 81 milliGRAM(s) Oral daily  clopidogrel Tablet 75 milliGRAM(s) Oral daily  docusate sodium 100 milliGRAM(s) Oral three times a day  heparin  Infusion 1200 Unit(s)/Hr (12 mL/Hr) IV Continuous <Continuous>  metoprolol succinate ER 25 milliGRAM(s) Oral daily  pantoprazole    Tablet 40 milliGRAM(s) Oral before breakfast  potassium phosphate IVPB 15 milliMole(s) IV Intermittent once  senna 2 Tablet(s) Oral at bedtime  tamsulosin 0.4 milliGRAM(s) Oral at bedtime    MEDICATIONS  (PRN):  ondansetron Injectable 4 milliGRAM(s) IV Push every 6 hours PRN Nausea      ALLERGIES:  Allergies    No Known Allergies    Intolerances        LABS:                        9.9    7.5   )-----------( 199      ( 19 Oct 2018 06:45 )             30.6     10-19    139  |  101  |  16  ----------------------------<  114<H>  3.6   |  26  |  1.05    Ca    10.1      19 Oct 2018 06:45  Phos  2.5     10-19  Mg     1.9     10-19    TPro  7.5  /  Alb  4.0  /  TBili  1.3<H>  /  DBili  x   /  AST  23  /  ALT  10  /  AlkPhos  67  10-19    PT/INR - ( 19 Oct 2018 06:45 )   PT: 14.7 sec;   INR: 1.32        PTT - ( 19 Oct 2018 06:45 )  PTT:35.0 sec    POCT Blood Glucose.: 101 mg/dL (18 Oct 2018 07:14)    RADIOLOGY & ADDITIONAL TESTS: Reviewed.    ECG: A. fib, RBBB

## 2018-10-20 LAB
ALBUMIN SERPL ELPH-MCNC: 3.6 G/DL — SIGNIFICANT CHANGE UP (ref 3.3–5)
ALP SERPL-CCNC: 59 U/L — SIGNIFICANT CHANGE UP (ref 40–120)
ALT FLD-CCNC: 8 U/L — LOW (ref 10–45)
ANION GAP SERPL CALC-SCNC: 14 MMOL/L — SIGNIFICANT CHANGE UP (ref 5–17)
APTT BLD: 59.2 SEC — HIGH (ref 27.5–37.4)
APTT BLD: 79 SEC — HIGH (ref 27.5–37.4)
APTT BLD: 85.8 SEC — HIGH (ref 27.5–37.4)
AST SERPL-CCNC: 18 U/L — SIGNIFICANT CHANGE UP (ref 10–40)
BILIRUB SERPL-MCNC: 1.4 MG/DL — HIGH (ref 0.2–1.2)
BUN SERPL-MCNC: 21 MG/DL — SIGNIFICANT CHANGE UP (ref 7–23)
CALCIUM SERPL-MCNC: 9.6 MG/DL — SIGNIFICANT CHANGE UP (ref 8.4–10.5)
CHLORIDE SERPL-SCNC: 101 MMOL/L — SIGNIFICANT CHANGE UP (ref 96–108)
CK MB CFR SERPL CALC: <1 NG/ML — SIGNIFICANT CHANGE UP (ref 0–6.7)
CK SERPL-CCNC: 82 U/L — SIGNIFICANT CHANGE UP (ref 30–200)
CO2 SERPL-SCNC: 23 MMOL/L — SIGNIFICANT CHANGE UP (ref 22–31)
CREAT SERPL-MCNC: 0.97 MG/DL — SIGNIFICANT CHANGE UP (ref 0.5–1.3)
GLUCOSE SERPL-MCNC: 115 MG/DL — HIGH (ref 70–99)
HCT VFR BLD CALC: 29.6 % — LOW (ref 39–50)
HGB BLD-MCNC: 9.2 G/DL — LOW (ref 13–17)
INR BLD: 1.47 — HIGH (ref 0.88–1.16)
MAGNESIUM SERPL-MCNC: 2 MG/DL — SIGNIFICANT CHANGE UP (ref 1.6–2.6)
MCHC RBC-ENTMCNC: 30.3 PG — SIGNIFICANT CHANGE UP (ref 27–34)
MCHC RBC-ENTMCNC: 31.1 G/DL — LOW (ref 32–36)
MCV RBC AUTO: 97.4 FL — SIGNIFICANT CHANGE UP (ref 80–100)
PHOSPHATE SERPL-MCNC: 2.3 MG/DL — LOW (ref 2.5–4.5)
PLATELET # BLD AUTO: 173 K/UL — SIGNIFICANT CHANGE UP (ref 150–400)
POTASSIUM SERPL-MCNC: 3.3 MMOL/L — LOW (ref 3.5–5.3)
POTASSIUM SERPL-SCNC: 3.3 MMOL/L — LOW (ref 3.5–5.3)
PROT SERPL-MCNC: 7.2 G/DL — SIGNIFICANT CHANGE UP (ref 6–8.3)
PROTHROM AB SERPL-ACNC: 16.4 SEC — HIGH (ref 9.8–12.7)
RBC # BLD: 3.04 M/UL — LOW (ref 4.2–5.8)
RBC # FLD: 14.4 % — SIGNIFICANT CHANGE UP (ref 10.3–16.9)
SODIUM SERPL-SCNC: 138 MMOL/L — SIGNIFICANT CHANGE UP (ref 135–145)
TROPONIN T SERPL-MCNC: 0.13 NG/ML — CRITICAL HIGH (ref 0–0.01)
TROPONIN T SERPL-MCNC: 0.18 NG/ML — CRITICAL HIGH (ref 0–0.01)
WBC # BLD: 8.5 K/UL — SIGNIFICANT CHANGE UP (ref 3.8–10.5)
WBC # FLD AUTO: 8.5 K/UL — SIGNIFICANT CHANGE UP (ref 3.8–10.5)

## 2018-10-20 PROCEDURE — 71045 X-RAY EXAM CHEST 1 VIEW: CPT | Mod: 26

## 2018-10-20 PROCEDURE — 99232 SBSQ HOSP IP/OBS MODERATE 35: CPT

## 2018-10-20 PROCEDURE — 93306 TTE W/DOPPLER COMPLETE: CPT | Mod: 26

## 2018-10-20 RX ORDER — LOSARTAN POTASSIUM 100 MG/1
50 TABLET, FILM COATED ORAL DAILY
Qty: 0 | Refills: 0 | Status: DISCONTINUED | OUTPATIENT
Start: 2018-10-20 | End: 2018-10-23

## 2018-10-20 RX ORDER — ACETAMINOPHEN 500 MG
650 TABLET ORAL EVERY 6 HOURS
Qty: 0 | Refills: 0 | Status: DISCONTINUED | OUTPATIENT
Start: 2018-10-20 | End: 2018-10-23

## 2018-10-20 RX ORDER — MORPHINE SULFATE 50 MG/1
1 CAPSULE, EXTENDED RELEASE ORAL EVERY 4 HOURS
Qty: 0 | Refills: 0 | Status: DISCONTINUED | OUTPATIENT
Start: 2018-10-20 | End: 2018-10-23

## 2018-10-20 RX ORDER — HEPARIN SODIUM 5000 [USP'U]/ML
1100 INJECTION INTRAVENOUS; SUBCUTANEOUS
Qty: 25000 | Refills: 0 | Status: DISCONTINUED | OUTPATIENT
Start: 2018-10-20 | End: 2018-10-21

## 2018-10-20 RX ORDER — POLYETHYLENE GLYCOL 3350 17 G/17G
17 POWDER, FOR SOLUTION ORAL
Qty: 0 | Refills: 0 | Status: DISCONTINUED | OUTPATIENT
Start: 2018-10-20 | End: 2018-10-23

## 2018-10-20 RX ORDER — ISOSORBIDE MONONITRATE 60 MG/1
30 TABLET, EXTENDED RELEASE ORAL DAILY
Qty: 0 | Refills: 0 | Status: DISCONTINUED | OUTPATIENT
Start: 2018-10-20 | End: 2018-10-23

## 2018-10-20 RX ORDER — POTASSIUM CHLORIDE 20 MEQ
40 PACKET (EA) ORAL ONCE
Qty: 0 | Refills: 0 | Status: COMPLETED | OUTPATIENT
Start: 2018-10-20 | End: 2018-10-20

## 2018-10-20 RX ADMIN — Medication 81 MILLIGRAM(S): at 10:22

## 2018-10-20 RX ADMIN — TAMSULOSIN HYDROCHLORIDE 0.4 MILLIGRAM(S): 0.4 CAPSULE ORAL at 22:28

## 2018-10-20 RX ADMIN — LOSARTAN POTASSIUM 50 MILLIGRAM(S): 100 TABLET, FILM COATED ORAL at 10:22

## 2018-10-20 RX ADMIN — SENNA PLUS 2 TABLET(S): 8.6 TABLET ORAL at 22:28

## 2018-10-20 RX ADMIN — POLYETHYLENE GLYCOL 3350 17 GRAM(S): 17 POWDER, FOR SOLUTION ORAL at 17:33

## 2018-10-20 RX ADMIN — CLOPIDOGREL BISULFATE 75 MILLIGRAM(S): 75 TABLET, FILM COATED ORAL at 10:22

## 2018-10-20 RX ADMIN — Medication 25 MILLIGRAM(S): at 05:28

## 2018-10-20 RX ADMIN — MORPHINE SULFATE 1 MILLIGRAM(S): 50 CAPSULE, EXTENDED RELEASE ORAL at 11:40

## 2018-10-20 RX ADMIN — HYDROMORPHONE HYDROCHLORIDE 2 MILLIGRAM(S): 2 INJECTION INTRAMUSCULAR; INTRAVENOUS; SUBCUTANEOUS at 00:00

## 2018-10-20 RX ADMIN — Medication 100 MILLIGRAM(S): at 22:28

## 2018-10-20 RX ADMIN — Medication 100 MILLIGRAM(S): at 14:25

## 2018-10-20 RX ADMIN — MORPHINE SULFATE 1 MILLIGRAM(S): 50 CAPSULE, EXTENDED RELEASE ORAL at 18:26

## 2018-10-20 RX ADMIN — Medication 650 MILLIGRAM(S): at 00:52

## 2018-10-20 RX ADMIN — Medication 650 MILLIGRAM(S): at 01:48

## 2018-10-20 RX ADMIN — Medication 650 MILLIGRAM(S): at 12:20

## 2018-10-20 RX ADMIN — Medication 650 MILLIGRAM(S): at 11:40

## 2018-10-20 RX ADMIN — PANTOPRAZOLE SODIUM 40 MILLIGRAM(S): 20 TABLET, DELAYED RELEASE ORAL at 05:28

## 2018-10-20 RX ADMIN — MORPHINE SULFATE 1 MILLIGRAM(S): 50 CAPSULE, EXTENDED RELEASE ORAL at 19:09

## 2018-10-20 RX ADMIN — ISOSORBIDE MONONITRATE 30 MILLIGRAM(S): 60 TABLET, EXTENDED RELEASE ORAL at 11:17

## 2018-10-20 RX ADMIN — ATORVASTATIN CALCIUM 20 MILLIGRAM(S): 80 TABLET, FILM COATED ORAL at 22:28

## 2018-10-20 RX ADMIN — MORPHINE SULFATE 1 MILLIGRAM(S): 50 CAPSULE, EXTENDED RELEASE ORAL at 12:20

## 2018-10-20 RX ADMIN — Medication 40 MILLIEQUIVALENT(S): at 08:09

## 2018-10-20 RX ADMIN — MORPHINE SULFATE 1 MILLIGRAM(S): 50 CAPSULE, EXTENDED RELEASE ORAL at 00:52

## 2018-10-20 RX ADMIN — Medication 650 MILLIGRAM(S): at 18:28

## 2018-10-20 RX ADMIN — MORPHINE SULFATE 1 MILLIGRAM(S): 50 CAPSULE, EXTENDED RELEASE ORAL at 01:48

## 2018-10-20 RX ADMIN — Medication 1.25 MILLIGRAM(S): at 00:33

## 2018-10-20 RX ADMIN — Medication 650 MILLIGRAM(S): at 19:09

## 2018-10-20 RX ADMIN — HEPARIN SODIUM 11 UNIT(S)/HR: 5000 INJECTION INTRAVENOUS; SUBCUTANEOUS at 13:40

## 2018-10-20 NOTE — PROGRESS NOTE ADULT - ASSESSMENT
89yo M with CAD, A-fib (on Eliquis), CHF, GERD, HLD, BPH, s/p bilateral open inguinal hernia repair (10/18) complicated by post operative chest pain.    - Recommend post void residual, straight cath if > 200cc  - bowel regimen  - will continue to follow

## 2018-10-20 NOTE — PROGRESS NOTE ADULT - SUBJECTIVE AND OBJECTIVE BOX
S: Patient examined bedside. Complains of abdominal pain. Denies BM, but is passing flatus.     O:     Vital Signs Last 24 Hrs  T(C): 37.7 (20 Oct 2018 14:19), Max: 38.8 (20 Oct 2018 12:40)  T(F): 99.9 (20 Oct 2018 14:19), Max: 101.8 (20 Oct 2018 12:40)  HR: 62 (20 Oct 2018 14:22) (50 - 68)  BP: 114/46 (20 Oct 2018 14:22) (114/46 - 197/82)  BP(mean): 85 (20 Oct 2018 05:04) (85 - 124)  RR: 16 (20 Oct 2018 14:22) (16 - 18)  SpO2: 93% (20 Oct 2018 14:22) (93% - 98%)  I&O's Summary    19 Oct 2018 07:01  -  20 Oct 2018 07:00  --------------------------------------------------------  IN: 885 mL / OUT: 425 mL / NET: 460 mL    20 Oct 2018 07:01  -  20 Oct 2018 16:04  --------------------------------------------------------  IN: 438 mL / OUT: 75 mL / NET: 363 mL        Gen - NAD  Neuro - A/O X3  Resp - normal respiratory effort, no distress  GI - abd distended, Diffuse TTP, moderately firm to palp, incisions C/D/I                        9.2    8.5   )-----------( 173      ( 20 Oct 2018 06:46 )             29.6   10-20    138  |  101  |  21  ----------------------------<  115<H>  3.3<L>   |  23  |  0.97    Ca    9.6      20 Oct 2018 06:46  Phos  2.3     10-20  Mg     2.0     10-20    TPro  7.2  /  Alb  3.6  /  TBili  1.4<H>  /  DBili  x   /  AST  18  /  ALT  8<L>  /  AlkPhos  59  10-20  LIVER FUNCTIONS - ( 20 Oct 2018 06:46 )  Alb: 3.6 g/dL / Pro: 7.2 g/dL / ALK PHOS: 59 U/L / ALT: 8 U/L / AST: 18 U/L / GGT: x         CARDIAC MARKERS ( 20 Oct 2018 12:57 )  x     / 0.13 ng/mL / x     / x     / x      CARDIAC MARKERS ( 19 Oct 2018 12:46 )  x     / 0.15 ng/mL / 203 U/L / x     / 3.2 ng/mL  CARDIAC MARKERS ( 19 Oct 2018 06:45 )  x     / 0.15 ng/mL / 216 U/L / x     / 5.0 ng/mL  CARDIAC MARKERS ( 19 Oct 2018 01:06 )  x     / 0.10 ng/mL / x     / x     / x      CARDIAC MARKERS ( 18 Oct 2018 21:01 )  x     / 0.05 ng/mL / 159 U/L / x     / 5.4 ng/mL  CARDIAC MARKERS ( 18 Oct 2018 16:23 )  x     / 0.01 ng/mL / x     / x     / x

## 2018-10-20 NOTE — PROGRESS NOTE ADULT - SUBJECTIVE AND OBJECTIVE BOX
NOTE INCOMPLETE***    OVERNIGHT EVENTS:    SUBJECTIVE / INTERVAL HPI: Patient seen and examined at bedside.     VITAL SIGNS:  Vital Signs Last 24 Hrs  T(C): 37.1 (20 Oct 2018 05:07), Max: 37.4 (19 Oct 2018 13:48)  T(F): 98.7 (20 Oct 2018 05:07), Max: 99.3 (19 Oct 2018 13:48)  HR: 64 (20 Oct 2018 05:04) (50 - 68)  BP: 157/61 (20 Oct 2018 05:04) (132/63 - 197/82)  BP(mean): 85 (20 Oct 2018 05:04) (85 - 124)  RR: 16 (20 Oct 2018 05:04) (16 - 18)  SpO2: 93% (20 Oct 2018 05:04) (93% - 98%)    PHYSICAL EXAM:    General: Well developed, well nourished, no acute distress  HEENT: NC/AT; PERRL, anicteric sclera; MMM  Neck: supple  Cardiovascular: +S1/S2, irregularly irregular, no murmurs, rubs, gallops  Respiratory: CTA B/L; no W/R/R  Gastrointestinal: soft, NT/ND; +BSx4  Extremities: WWP; no edema, clubbing or cyanosis  Vascular: 2+ radial and pedal pulses  Neurological: AAOx3; no focal deficits    MEDICATIONS:  MEDICATIONS  (STANDING):  aspirin enteric coated 81 milliGRAM(s) Oral daily  atorvastatin 20 milliGRAM(s) Oral at bedtime  clopidogrel Tablet 75 milliGRAM(s) Oral daily  docusate sodium 100 milliGRAM(s) Oral three times a day  heparin  Infusion 1000 Unit(s)/Hr (10 mL/Hr) IV Continuous <Continuous>  losartan 50 milliGRAM(s) Oral daily  metoprolol succinate ER 25 milliGRAM(s) Oral daily  pantoprazole    Tablet 40 milliGRAM(s) Oral before breakfast  potassium chloride    Tablet ER 40 milliEquivalent(s) Oral once  senna 2 Tablet(s) Oral at bedtime  tamsulosin 0.4 milliGRAM(s) Oral at bedtime    MEDICATIONS  (PRN):  acetaminophen   Tablet .. 650 milliGRAM(s) Oral every 6 hours PRN Moderate Pain (4 - 6)  morphine  - Injectable 1 milliGRAM(s) IV Push every 4 hours PRN Severe Pain (7 - 10)  ondansetron Injectable 4 milliGRAM(s) IV Push every 6 hours PRN Nausea      ALLERGIES:  Allergies    No Known Allergies    Intolerances        LABS:                        9.2    8.5   )-----------( 173      ( 20 Oct 2018 06:46 )             29.6     10-20    138  |  101  |  21  ----------------------------<  115<H>  3.3<L>   |  23  |  0.97    Ca    9.6      20 Oct 2018 06:46  Phos  2.3     10-20  Mg     2.0     10-20    TPro  7.2  /  Alb  3.6  /  TBili  1.4<H>  /  DBili  x   /  AST  18  /  ALT  8<L>  /  AlkPhos  59  10-20    PT/INR - ( 20 Oct 2018 06:46 )   PT: 16.4 sec;   INR: 1.47       PTT - ( 20 Oct 2018 06:46 )  PTT:85.8 sec    RADIOLOGY & ADDITIONAL TESTS: Reviewed. OVERNIGHT EVENTS: MONSE    SUBJECTIVE / INTERVAL HPI: Patient seen and examined at bedside. No CP, no SOB, tolerating PO. Some tenderness at inguinal site.    VITAL SIGNS:  Vital Signs Last 24 Hrs  T(C): 37.1 (20 Oct 2018 05:07), Max: 37.4 (19 Oct 2018 13:48)  T(F): 98.7 (20 Oct 2018 05:07), Max: 99.3 (19 Oct 2018 13:48)  HR: 64 (20 Oct 2018 05:04) (50 - 68)  BP: 157/61 (20 Oct 2018 05:04) (132/63 - 197/82)  BP(mean): 85 (20 Oct 2018 05:04) (85 - 124)  RR: 16 (20 Oct 2018 05:04) (16 - 18)  SpO2: 93% (20 Oct 2018 05:04) (93% - 98%)    PHYSICAL EXAM:    General: Well developed, well nourished, no acute distress  HEENT: NC/AT; PERRL, anicteric sclera; MMM  Neck: supple  Cardiovascular: +S1/S2, irregularly irregular, no murmurs, rubs, gallops  Respiratory: CTA B/L; no W/R/R  Gastrointestinal: soft, NT/ND; +BSx4  Extremities: WWP; no edema, clubbing or cyanosis  Vascular: 2+ radial and pedal pulses  Neurological: AAOx3; no focal deficits    MEDICATIONS:  MEDICATIONS  (STANDING):  aspirin enteric coated 81 milliGRAM(s) Oral daily  atorvastatin 20 milliGRAM(s) Oral at bedtime  clopidogrel Tablet 75 milliGRAM(s) Oral daily  docusate sodium 100 milliGRAM(s) Oral three times a day  heparin  Infusion 1000 Unit(s)/Hr (10 mL/Hr) IV Continuous <Continuous>  losartan 50 milliGRAM(s) Oral daily  metoprolol succinate ER 25 milliGRAM(s) Oral daily  pantoprazole    Tablet 40 milliGRAM(s) Oral before breakfast  potassium chloride    Tablet ER 40 milliEquivalent(s) Oral once  senna 2 Tablet(s) Oral at bedtime  tamsulosin 0.4 milliGRAM(s) Oral at bedtime    MEDICATIONS  (PRN):  acetaminophen   Tablet .. 650 milliGRAM(s) Oral every 6 hours PRN Moderate Pain (4 - 6)  morphine  - Injectable 1 milliGRAM(s) IV Push every 4 hours PRN Severe Pain (7 - 10)  ondansetron Injectable 4 milliGRAM(s) IV Push every 6 hours PRN Nausea      ALLERGIES:  Allergies    No Known Allergies    Intolerances        LABS:                        9.2    8.5   )-----------( 173      ( 20 Oct 2018 06:46 )             29.6     10-20    138  |  101  |  21  ----------------------------<  115<H>  3.3<L>   |  23  |  0.97    Ca    9.6      20 Oct 2018 06:46  Phos  2.3     10-20  Mg     2.0     10-20    TPro  7.2  /  Alb  3.6  /  TBili  1.4<H>  /  DBili  x   /  AST  18  /  ALT  8<L>  /  AlkPhos  59  10-20    PT/INR - ( 20 Oct 2018 06:46 )   PT: 16.4 sec;   INR: 1.47       PTT - ( 20 Oct 2018 06:46 )  PTT:85.8 sec    RADIOLOGY & ADDITIONAL TESTS: Reviewed.

## 2018-10-20 NOTE — PROGRESS NOTE ADULT - ATTENDING COMMENTS
Assessment: Patient personally seen and examined myself during rounds with the Physician Assistant/House Staff/Nurse Practitioner  ON DATE 10/20/18  Physician Assistant/House Staff/Nurse Practitioner note read, including vitals, physical findings, laboratory data, and radiological reports.   Revisions included below.   Direct personal management at bed side and extensive interpretation of the data.    Plan was outlined and discussed in details with the Physician Assistant/House Staff/Nurse Practitioner.    Decision making of high complexity   Risk high of complications, morbidity, and/or mortality  Assessment and Action taken for acute disease activity to reflect the level of care provided:  -Hemodynamic evaluation and support  -Medication reconciliation  -Review laboratory data  -Cardiac Telemetry reviewed  - NSTEMI - type II MI in setting of postop status, no need for intervention - cp persistst advance anti anginal therapy  -EKG reviewed   -Echo reviewed   -ACS assessment and treatment as applicable  -Heart failure assessment and treatment as applicable  -Interdisciplinary discussion with IC / EP / HF / CTS teams as needed  TIME SPENT in evaluation and management, reassessments, review and interpretation of labs and x-rays, and hemodynamic management, formulating a plan and coordinating care: ___35____ MIN.  Time does not include procedural time.    Calvin Knutson MD  Cardiology    Mobile: [phone]  Office: [phone]  [address] [place] [address]

## 2018-10-21 LAB
ANION GAP SERPL CALC-SCNC: 12 MMOL/L — SIGNIFICANT CHANGE UP (ref 5–17)
APTT BLD: 69.6 SEC — HIGH (ref 27.5–37.4)
BLD GP AB SCN SERPL QL: NEGATIVE — SIGNIFICANT CHANGE UP
BUN SERPL-MCNC: 21 MG/DL — SIGNIFICANT CHANGE UP (ref 7–23)
CALCIUM SERPL-MCNC: 9.1 MG/DL — SIGNIFICANT CHANGE UP (ref 8.4–10.5)
CHLORIDE SERPL-SCNC: 101 MMOL/L — SIGNIFICANT CHANGE UP (ref 96–108)
CK MB CFR SERPL CALC: <1 NG/ML — SIGNIFICANT CHANGE UP (ref 0–6.7)
CK SERPL-CCNC: 77 U/L — SIGNIFICANT CHANGE UP (ref 30–200)
CO2 SERPL-SCNC: 24 MMOL/L — SIGNIFICANT CHANGE UP (ref 22–31)
CREAT SERPL-MCNC: 0.93 MG/DL — SIGNIFICANT CHANGE UP (ref 0.5–1.3)
GLUCOSE SERPL-MCNC: 108 MG/DL — HIGH (ref 70–99)
HCT VFR BLD CALC: 22.6 % — LOW (ref 39–50)
HCT VFR BLD CALC: 22.9 % — LOW (ref 39–50)
HCT VFR BLD CALC: 24.9 % — LOW (ref 39–50)
HGB BLD-MCNC: 7 G/DL — CRITICAL LOW (ref 13–17)
HGB BLD-MCNC: 7.2 G/DL — LOW (ref 13–17)
HGB BLD-MCNC: 7.7 G/DL — LOW (ref 13–17)
INR BLD: 1.41 — HIGH (ref 0.88–1.16)
MAGNESIUM SERPL-MCNC: 2 MG/DL — SIGNIFICANT CHANGE UP (ref 1.6–2.6)
MCHC RBC-ENTMCNC: 30.7 PG — SIGNIFICANT CHANGE UP (ref 27–34)
MCHC RBC-ENTMCNC: 30.9 G/DL — LOW (ref 32–36)
MCHC RBC-ENTMCNC: 31 G/DL — LOW (ref 32–36)
MCHC RBC-ENTMCNC: 31.2 PG — SIGNIFICANT CHANGE UP (ref 27–34)
MCHC RBC-ENTMCNC: 31.3 PG — SIGNIFICANT CHANGE UP (ref 27–34)
MCHC RBC-ENTMCNC: 31.4 G/DL — LOW (ref 32–36)
MCV RBC AUTO: 100.9 FL — HIGH (ref 80–100)
MCV RBC AUTO: 99.1 FL — SIGNIFICANT CHANGE UP (ref 80–100)
MCV RBC AUTO: 99.2 FL — SIGNIFICANT CHANGE UP (ref 80–100)
PLATELET # BLD AUTO: 147 K/UL — LOW (ref 150–400)
PLATELET # BLD AUTO: 175 K/UL — SIGNIFICANT CHANGE UP (ref 150–400)
PLATELET # BLD AUTO: 184 K/UL — SIGNIFICANT CHANGE UP (ref 150–400)
POTASSIUM SERPL-MCNC: 3.9 MMOL/L — SIGNIFICANT CHANGE UP (ref 3.5–5.3)
POTASSIUM SERPL-SCNC: 3.9 MMOL/L — SIGNIFICANT CHANGE UP (ref 3.5–5.3)
PROTHROM AB SERPL-ACNC: 15.8 SEC — HIGH (ref 9.8–12.7)
RBC # BLD: 2.24 M/UL — LOW (ref 4.2–5.8)
RBC # BLD: 2.3 M/UL — LOW (ref 4.2–5.8)
RBC # BLD: 2.31 M/UL — LOW (ref 4.2–5.8)
RBC # BLD: 2.51 M/UL — LOW (ref 4.2–5.8)
RBC # FLD: 13.8 % — SIGNIFICANT CHANGE UP (ref 10.3–16.9)
RBC # FLD: 13.9 % — SIGNIFICANT CHANGE UP (ref 10.3–16.9)
RBC # FLD: 14.4 % — SIGNIFICANT CHANGE UP (ref 10.3–16.9)
RETICS/RBC NFR: 1.6 % — SIGNIFICANT CHANGE UP (ref 0.5–2.5)
RH IG SCN BLD-IMP: POSITIVE — SIGNIFICANT CHANGE UP
SODIUM SERPL-SCNC: 137 MMOL/L — SIGNIFICANT CHANGE UP (ref 135–145)
TROPONIN T SERPL-MCNC: 0.18 NG/ML — CRITICAL HIGH (ref 0–0.01)
WBC # BLD: 6.2 K/UL — SIGNIFICANT CHANGE UP (ref 3.8–10.5)
WBC # BLD: 6.5 K/UL — SIGNIFICANT CHANGE UP (ref 3.8–10.5)
WBC # BLD: 6.7 K/UL — SIGNIFICANT CHANGE UP (ref 3.8–10.5)
WBC # FLD AUTO: 6.2 K/UL — SIGNIFICANT CHANGE UP (ref 3.8–10.5)
WBC # FLD AUTO: 6.5 K/UL — SIGNIFICANT CHANGE UP (ref 3.8–10.5)
WBC # FLD AUTO: 6.7 K/UL — SIGNIFICANT CHANGE UP (ref 3.8–10.5)

## 2018-10-21 PROCEDURE — 99233 SBSQ HOSP IP/OBS HIGH 50: CPT

## 2018-10-21 PROCEDURE — 74176 CT ABD & PELVIS W/O CONTRAST: CPT | Mod: 26

## 2018-10-21 RX ADMIN — Medication 650 MILLIGRAM(S): at 08:19

## 2018-10-21 RX ADMIN — LOSARTAN POTASSIUM 50 MILLIGRAM(S): 100 TABLET, FILM COATED ORAL at 07:16

## 2018-10-21 RX ADMIN — Medication 650 MILLIGRAM(S): at 00:34

## 2018-10-21 RX ADMIN — MORPHINE SULFATE 1 MILLIGRAM(S): 50 CAPSULE, EXTENDED RELEASE ORAL at 01:33

## 2018-10-21 RX ADMIN — MORPHINE SULFATE 1 MILLIGRAM(S): 50 CAPSULE, EXTENDED RELEASE ORAL at 08:19

## 2018-10-21 RX ADMIN — Medication 650 MILLIGRAM(S): at 07:14

## 2018-10-21 RX ADMIN — MORPHINE SULFATE 1 MILLIGRAM(S): 50 CAPSULE, EXTENDED RELEASE ORAL at 00:34

## 2018-10-21 RX ADMIN — CLOPIDOGREL BISULFATE 75 MILLIGRAM(S): 75 TABLET, FILM COATED ORAL at 11:43

## 2018-10-21 RX ADMIN — Medication 100 MILLIGRAM(S): at 22:11

## 2018-10-21 RX ADMIN — ISOSORBIDE MONONITRATE 30 MILLIGRAM(S): 60 TABLET, EXTENDED RELEASE ORAL at 11:43

## 2018-10-21 RX ADMIN — SENNA PLUS 2 TABLET(S): 8.6 TABLET ORAL at 22:11

## 2018-10-21 RX ADMIN — POLYETHYLENE GLYCOL 3350 17 GRAM(S): 17 POWDER, FOR SOLUTION ORAL at 17:45

## 2018-10-21 RX ADMIN — HEPARIN SODIUM 11 UNIT(S)/HR: 5000 INJECTION INTRAVENOUS; SUBCUTANEOUS at 07:14

## 2018-10-21 RX ADMIN — Medication 650 MILLIGRAM(S): at 20:27

## 2018-10-21 RX ADMIN — MORPHINE SULFATE 1 MILLIGRAM(S): 50 CAPSULE, EXTENDED RELEASE ORAL at 10:33

## 2018-10-21 RX ADMIN — PANTOPRAZOLE SODIUM 40 MILLIGRAM(S): 20 TABLET, DELAYED RELEASE ORAL at 07:16

## 2018-10-21 RX ADMIN — Medication 25 MILLIGRAM(S): at 11:43

## 2018-10-21 RX ADMIN — TAMSULOSIN HYDROCHLORIDE 0.4 MILLIGRAM(S): 0.4 CAPSULE ORAL at 22:11

## 2018-10-21 RX ADMIN — Medication 100 MILLIGRAM(S): at 14:09

## 2018-10-21 RX ADMIN — POLYETHYLENE GLYCOL 3350 17 GRAM(S): 17 POWDER, FOR SOLUTION ORAL at 07:15

## 2018-10-21 RX ADMIN — Medication 650 MILLIGRAM(S): at 17:50

## 2018-10-21 RX ADMIN — MORPHINE SULFATE 1 MILLIGRAM(S): 50 CAPSULE, EXTENDED RELEASE ORAL at 07:13

## 2018-10-21 RX ADMIN — Medication 100 MILLIGRAM(S): at 07:16

## 2018-10-21 RX ADMIN — Medication 81 MILLIGRAM(S): at 11:43

## 2018-10-21 RX ADMIN — Medication 650 MILLIGRAM(S): at 01:33

## 2018-10-21 RX ADMIN — MORPHINE SULFATE 1 MILLIGRAM(S): 50 CAPSULE, EXTENDED RELEASE ORAL at 14:04

## 2018-10-21 RX ADMIN — ATORVASTATIN CALCIUM 20 MILLIGRAM(S): 80 TABLET, FILM COATED ORAL at 22:11

## 2018-10-21 NOTE — PROGRESS NOTE ADULT - PROBLEM SELECTOR PLAN 3
- Rate controlled, on home Eliquis  - Continue heparin ggt  - Continue metoprolol 25mg daily - Rate controlled  - Continue metoprolol 25mg daily  - Holding heparin gtt in the setting of 2 point hgb drop. Can restart home eliquis when cleared by surgery.

## 2018-10-21 NOTE — PROGRESS NOTE ADULT - SUBJECTIVE AND OBJECTIVE BOX
Pt seen at bedside for evaluation after called by cardiac team regarding downtrending Hgb. Pt was seen earlier today transfusion was recommended. Pt apparently had refused but is agreeable now. Pt had CT A/P today which revealed mild-mod hematoma.    MEDICATIONS  (STANDING):  aspirin enteric coated 81 milliGRAM(s) Oral daily  atorvastatin 20 milliGRAM(s) Oral at bedtime  clopidogrel Tablet 75 milliGRAM(s) Oral daily  docusate sodium 100 milliGRAM(s) Oral three times a day  isosorbide   mononitrate ER Tablet (IMDUR) 30 milliGRAM(s) Oral daily  losartan 50 milliGRAM(s) Oral daily  metoprolol succinate ER 25 milliGRAM(s) Oral daily  pantoprazole    Tablet 40 milliGRAM(s) Oral before breakfast  polyethylene glycol 3350 17 Gram(s) Oral two times a day  senna 2 Tablet(s) Oral at bedtime  tamsulosin 0.4 milliGRAM(s) Oral at bedtime    MEDICATIONS  (PRN):  acetaminophen   Tablet .. 650 milliGRAM(s) Oral every 6 hours PRN Temp greater or equal to 38C (100.4F)  acetaminophen   Tablet .. 650 milliGRAM(s) Oral every 6 hours PRN Moderate Pain (4 - 6)  morphine  - Injectable 1 milliGRAM(s) IV Push every 4 hours PRN Severe Pain (7 - 10)  ondansetron Injectable 4 milliGRAM(s) IV Push every 6 hours PRN Nausea      Vital Signs Last 24 Hrs  T(C): 37.5 (21 Oct 2018 17:57), Max: 37.5 (20 Oct 2018 22:00)  T(F): 99.5 (21 Oct 2018 17:57), Max: 99.5 (20 Oct 2018 22:00)  HR: 64 (21 Oct 2018 21:00) (64 - 72)  BP: 129/55 (21 Oct 2018 21:00) (115/40 - 163/66)  BP(mean): 78 (21 Oct 2018 21:00) (63 - 117)  RR: 16 (21 Oct 2018 21:00) (16 - 17)  SpO2: 98% (21 Oct 2018 21:00) (96% - 99%)    PHYSICAL EXAM:      Constitutional: A&Ox3        Respiratory: non labored breathing, no respiratory distress      Gastrointestinal:                 Incision: clean and dry. No active bleeding. Mild edema and ecchymosis seen adjacent to incision. Min tenderness.      I&O's Detail    20 Oct 2018 07:01  -  21 Oct 2018 07:00  --------------------------------------------------------  IN:    heparin Infusion: 70 mL    heparin Infusion: 198 mL    Oral Fluid: 457 mL  Total IN: 725 mL    OUT:    Voided: 515 mL  Total OUT: 515 mL    Total NET: 210 mL      21 Oct 2018 07:01  -  21 Oct 2018 21:42  --------------------------------------------------------  IN:    Oral Fluid: 450 mL  Total IN: 450 mL    OUT:    Voided: 250 mL  Total OUT: 250 mL    Total NET: 200 mL          LABS:                        7.0    6.2   )-----------( 175      ( 21 Oct 2018 20:29 )             22.6     10-21    137  |  101  |  21  ----------------------------<  108<H>  3.9   |  24  |  0.93    Ca    9.1      21 Oct 2018 03:39  Phos  2.3     10-20  Mg     2.0     10-21    TPro  7.2  /  Alb  3.6  /  TBili  1.4<H>  /  DBili  x   /  AST  18  /  ALT  8<L>  /  AlkPhos  59  10-20    PT/INR - ( 21 Oct 2018 03:39 )   PT: 15.8 sec;   INR: 1.41          PTT - ( 21 Oct 2018 03:39 )  PTT:69.6 sec      A/P:    89 y/p M s/p hernia repair c/b chest pain and was transferred to cardiac floor, now with mild-moderate hematoma and downtrending H/H.     -Continue cardiac monitoring  -Transfuse 1 unit PRBCs tonight  -trend H/H  -Team will follow  Discussed with chief as well as 5[facility] resident

## 2018-10-21 NOTE — PROGRESS NOTE ADULT - PROBLEM SELECTOR PLAN 1
- post-op chest pain with troponins elevated (peaked)  - Continue heparin ggt, ASA, Plavix for post NSTEMI management  - Continue metoprolol  - F/u echo Admitted to cardiology for post-op chest pain with troponins elevated (peaked). ACS rule out. On heparin gtt for 48 hours.   - Discontinued heparin gtt in the setting of 2 point hemoglobin drop this AM.  - Continue with ASA, Plavix for post NSTEMI management  - Continue metoprolol  - F/u echo

## 2018-10-21 NOTE — PROGRESS NOTE ADULT - ASSESSMENT
89yo M with CAD, A-fib (on Eliquis), CHF, GERD, HLD, BPH, s/p bilateral open inguinal hernia repair (10/18) complicated by post operative chest pain.    - Hb dropped to 7.2/7.7. Please transfuse 1U PRBC and perform a posttransfusion CBC 6 hours after completion.   - Recommend post void residual, straight cath if > 200cc  - c/w bowel regimen  - Team 4C will continue to follow

## 2018-10-21 NOTE — PROGRESS NOTE ADULT - ASSESSMENT
Mr. 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 Mr. 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 Admitted to cardiology for ACS rule out.

## 2018-10-21 NOTE — PROGRESS NOTE ADULT - ATTENDING COMMENTS
Assessment: Patient personally seen and examined myself during rounds with the Physician Assistant/House Staff/Nurse Practitioner  ON DATE 10/21/18  Physician Assistant/House Staff/Nurse Practitioner note read, including vitals, physical findings, laboratory data, and radiological reports.   Revisions included below.   Direct personal management at bed side and extensive interpretation of the data.    Plan was outlined and discussed in details with the Physician Assistant/House Staff/Nurse Practitioner.    Decision making of high complexity   Risk high of complications, morbidity, and/or mortality  Assessment and Action taken for acute disease activity to reflect the level of care provided:  -Hemodynamic evaluation and support  -Medication reconciliation  -Review laboratory data  -Cardiac Telemetry reviewed  - NSTEMI - type 2 MI now with acute blood loss - will contact surgery asap, hold hep gtt for now and transufe  -EKG reviewed   -Echo reviewed   -ACS assessment and treatment as applicable  -Heart failure assessment and treatment as applicable  -Interdisciplinary discussion with IC / EP / HF / CTS teams as needed  TIME SPENT in evaluation and management, reassessments, review and interpretation of labs and x-rays, and hemodynamic management, formulating a plan and coordinating care: ___35____ MIN.  Time does not include procedural time.    Calvin Knutson MD  Cardiology    Mobile: [phone]  Office: [phone]  [address] [place] [address]

## 2018-10-21 NOTE — PROGRESS NOTE ADULT - SUBJECTIVE AND OBJECTIVE BOX
INTERVAL HPI/OVERNIGHT EVENTS:    OVERNIGHT: Hgb dropped 9.2 to 7.2, now 7.7 on recheck. Surgery called for re-evaluation.  SUBJECTIVE: Patient seen and examined at bedside. Complains of pain at site of open hernia repair. Denies CP, SOB, palpitations, cough, N/V/D.    ROS:  CV: Denies chest pain  Resp: Denies SOB  GI: Denies abdominal pain, constipation, diarrhea, nausea, vomiting  : Denies dysuria  ID: Denies fevers, chills  MSK: Denies joint pain     OBJECTIVE:    VITAL SIGNS:  ICU Vital Signs Last 24 Hrs  T(C): 37 (21 Oct 2018 11:19), Max: 38.8 (20 Oct 2018 12:40)  T(F): 98.6 (21 Oct 2018 11:19), Max: 101.8 (20 Oct 2018 12:40)  HR: 66 (21 Oct 2018 09:05) (58 - 66)  BP: 115/40 (21 Oct 2018 09:05) (112/54 - 134/61)  BP(mean): 63 (21 Oct 2018 09:05) (63 - 98)  RR: 16 (21 Oct 2018 09:05) (16 - 18)  SpO2: 97% (21 Oct 2018 09:05) (93% - 97%)    10-20 @ 07:01  -  10-21 @ 07:00  --------------------------------------------------------  IN: 725 mL / OUT: 515 mL / NET: 210 mL    10-21 @ 07:01  -  10-21 @ 11:32  --------------------------------------------------------  IN: 250 mL / OUT: 100 mL / NET: 150 mL    PHYSICAL EXAM:    General: NAD, distress due to pain  HEENT: NCAT, PERRL, clear conjunctiva, no scleral icterus  Neck: supple, no JVD  Respiratory: CTA b/l  Cardiovascular: RRR, normal S1S2, no M/R/G  Abdomen: soft, distended and tender around lower abdominal incision site, c/d/i.  Extremities: WWP, no clubbing, cyanosis, or edema  Neuro: AOx3    MEDICATIONS:  MEDICATIONS  (STANDING):  aspirin enteric coated 81 milliGRAM(s) Oral daily  atorvastatin 20 milliGRAM(s) Oral at bedtime  clopidogrel Tablet 75 milliGRAM(s) Oral daily  docusate sodium 100 milliGRAM(s) Oral three times a day  isosorbide   mononitrate ER Tablet (IMDUR) 30 milliGRAM(s) Oral daily  losartan 50 milliGRAM(s) Oral daily  metoprolol succinate ER 25 milliGRAM(s) Oral daily  pantoprazole    Tablet 40 milliGRAM(s) Oral before breakfast  polyethylene glycol 3350 17 Gram(s) Oral two times a day  senna 2 Tablet(s) Oral at bedtime  tamsulosin 0.4 milliGRAM(s) Oral at bedtime    MEDICATIONS  (PRN):  acetaminophen   Tablet .. 650 milliGRAM(s) Oral every 6 hours PRN Temp greater or equal to 38C (100.4F)  acetaminophen   Tablet .. 650 milliGRAM(s) Oral every 6 hours PRN Moderate Pain (4 - 6)  morphine  - Injectable 1 milliGRAM(s) IV Push every 4 hours PRN Severe Pain (7 - 10)  ondansetron Injectable 4 milliGRAM(s) IV Push every 6 hours PRN Nausea      ALLERGIES:  No Known Allergies      LABS:                        7.7    6.5   )-----------( 184      ( 21 Oct 2018 11:02 )             24.9     10-21    137  |  101  |  21  ----------------------------<  108<H>  3.9   |  24  |  0.93    Ca    9.1      21 Oct 2018 03:39  Phos  2.3     10-20  Mg     2.0     10-21    TPro  7.2  /  Alb  3.6  /  TBili  1.4<H>  /  DBili  x   /  AST  18  /  ALT  8<L>  /  AlkPhos  59  10-20    PT/INR - ( 21 Oct 2018 03:39 )   PT: 15.8 sec;   INR: 1.41          PTT - ( 21 Oct 2018 03:39 )  PTT:69.6 sec      RADIOLOGY & ADDITIONAL TESTS: Studies reviewed.

## 2018-10-21 NOTE — PROGRESS NOTE ADULT - SUBJECTIVE AND OBJECTIVE BOX
SUBJECTIVE: No acute events overnight. Pain controlled. Voiding. Denies n/v.     Vital Signs Last 24 Hrs  T(C): 37 (21 Oct 2018 11:19), Max: 38.8 (20 Oct 2018 12:50)  T(F): 98.6 (21 Oct 2018 11:19), Max: 101.8 (20 Oct 2018 12:50)  HR: 66 (21 Oct 2018 09:05) (58 - 66)  BP: 115/40 (21 Oct 2018 09:05) (112/54 - 134/61)  BP(mean): 63 (21 Oct 2018 09:05) (63 - 98)  RR: 16 (21 Oct 2018 09:05) (16 - 18)  SpO2: 97% (21 Oct 2018 09:05) (93% - 97%)    General: NAD, resting comfortably in bed  Pulm: Nonlabored breathing, no respiratory distress  Abd: soft, appropriately ttp, mildly distended, incisions cdi, possible hematoma noted    LABS:                        7.7    6.5   )-----------( 184      ( 21 Oct 2018 11:02 )             24.9     10-21    137  |  101  |  21  ----------------------------<  108<H>  3.9   |  24  |  0.93    Ca    9.1      21 Oct 2018 03:39  Phos  2.3     10-20  Mg     2.0     10-21    TPro  7.2  /  Alb  3.6  /  TBili  1.4<H>  /  DBili  x   /  AST  18  /  ALT  8<L>  /  AlkPhos  59  10-20    PT/INR - ( 21 Oct 2018 03:39 )   PT: 15.8 sec;   INR: 1.41          PTT - ( 21 Oct 2018 03:39 )  PTT:69.6 sec

## 2018-10-22 ENCOUNTER — TRANSCRIPTION ENCOUNTER (OUTPATIENT)
Age: 83
End: 2018-10-22

## 2018-10-22 DIAGNOSIS — S30.1XXA CONTUSION OF ABDOMINAL WALL, INITIAL ENCOUNTER: ICD-10-CM

## 2018-10-22 LAB
ANION GAP SERPL CALC-SCNC: 9 MMOL/L — SIGNIFICANT CHANGE UP (ref 5–17)
BUN SERPL-MCNC: 24 MG/DL — HIGH (ref 7–23)
CALCIUM SERPL-MCNC: 9.4 MG/DL — SIGNIFICANT CHANGE UP (ref 8.4–10.5)
CHLORIDE SERPL-SCNC: 103 MMOL/L — SIGNIFICANT CHANGE UP (ref 96–108)
CO2 SERPL-SCNC: 27 MMOL/L — SIGNIFICANT CHANGE UP (ref 22–31)
CREAT SERPL-MCNC: 0.91 MG/DL — SIGNIFICANT CHANGE UP (ref 0.5–1.3)
GLUCOSE SERPL-MCNC: 103 MG/DL — HIGH (ref 70–99)
HCT VFR BLD CALC: 23.9 % — LOW (ref 39–50)
HCT VFR BLD CALC: 25.2 % — LOW (ref 39–50)
HGB BLD-MCNC: 7.6 G/DL — LOW (ref 13–17)
HGB BLD-MCNC: 8 G/DL — LOW (ref 13–17)
MAGNESIUM SERPL-MCNC: 2.3 MG/DL — SIGNIFICANT CHANGE UP (ref 1.6–2.6)
MCHC RBC-ENTMCNC: 30.9 PG — SIGNIFICANT CHANGE UP (ref 27–34)
MCHC RBC-ENTMCNC: 31 PG — SIGNIFICANT CHANGE UP (ref 27–34)
MCHC RBC-ENTMCNC: 31.7 G/DL — LOW (ref 32–36)
MCHC RBC-ENTMCNC: 31.8 G/DL — LOW (ref 32–36)
MCV RBC AUTO: 97.3 FL — SIGNIFICANT CHANGE UP (ref 80–100)
MCV RBC AUTO: 97.6 FL — SIGNIFICANT CHANGE UP (ref 80–100)
PLATELET # BLD AUTO: 173 K/UL — SIGNIFICANT CHANGE UP (ref 150–400)
PLATELET # BLD AUTO: 180 K/UL — SIGNIFICANT CHANGE UP (ref 150–400)
POTASSIUM SERPL-MCNC: 4 MMOL/L — SIGNIFICANT CHANGE UP (ref 3.5–5.3)
POTASSIUM SERPL-SCNC: 4 MMOL/L — SIGNIFICANT CHANGE UP (ref 3.5–5.3)
RBC # BLD: 2.45 M/UL — LOW (ref 4.2–5.8)
RBC # BLD: 2.59 M/UL — LOW (ref 4.2–5.8)
RBC # FLD: 16.5 % — SIGNIFICANT CHANGE UP (ref 10.3–16.9)
RBC # FLD: 16.7 % — SIGNIFICANT CHANGE UP (ref 10.3–16.9)
SODIUM SERPL-SCNC: 139 MMOL/L — SIGNIFICANT CHANGE UP (ref 135–145)
WBC # BLD: 5.1 K/UL — SIGNIFICANT CHANGE UP (ref 3.8–10.5)
WBC # BLD: 5.8 K/UL — SIGNIFICANT CHANGE UP (ref 3.8–10.5)
WBC # FLD AUTO: 5.1 K/UL — SIGNIFICANT CHANGE UP (ref 3.8–10.5)
WBC # FLD AUTO: 5.8 K/UL — SIGNIFICANT CHANGE UP (ref 3.8–10.5)

## 2018-10-22 PROCEDURE — 99232 SBSQ HOSP IP/OBS MODERATE 35: CPT

## 2018-10-22 PROCEDURE — 93010 ELECTROCARDIOGRAM REPORT: CPT

## 2018-10-22 RX ORDER — ATORVASTATIN CALCIUM 80 MG/1
1 TABLET, FILM COATED ORAL
Qty: 0 | Refills: 0 | COMMUNITY
Start: 2018-10-22

## 2018-10-22 RX ORDER — METOPROLOL TARTRATE 50 MG
1 TABLET ORAL
Qty: 0 | Refills: 0 | COMMUNITY
Start: 2018-10-22

## 2018-10-22 RX ORDER — ASPIRIN/CALCIUM CARB/MAGNESIUM 324 MG
1 TABLET ORAL
Qty: 0 | Refills: 0 | COMMUNITY
Start: 2018-10-22

## 2018-10-22 RX ORDER — ISOSORBIDE MONONITRATE 60 MG/1
1 TABLET, EXTENDED RELEASE ORAL
Qty: 0 | Refills: 0 | COMMUNITY
Start: 2018-10-22

## 2018-10-22 RX ORDER — CLOPIDOGREL BISULFATE 75 MG/1
1 TABLET, FILM COATED ORAL
Qty: 0 | Refills: 0 | COMMUNITY
Start: 2018-10-22

## 2018-10-22 RX ORDER — PANTOPRAZOLE SODIUM 20 MG/1
1 TABLET, DELAYED RELEASE ORAL
Qty: 0 | Refills: 0 | COMMUNITY
Start: 2018-10-22

## 2018-10-22 RX ADMIN — MORPHINE SULFATE 1 MILLIGRAM(S): 50 CAPSULE, EXTENDED RELEASE ORAL at 02:43

## 2018-10-22 RX ADMIN — Medication 25 MILLIGRAM(S): at 06:20

## 2018-10-22 RX ADMIN — MORPHINE SULFATE 1 MILLIGRAM(S): 50 CAPSULE, EXTENDED RELEASE ORAL at 03:15

## 2018-10-22 RX ADMIN — ATORVASTATIN CALCIUM 20 MILLIGRAM(S): 80 TABLET, FILM COATED ORAL at 22:08

## 2018-10-22 RX ADMIN — Medication 100 MILLIGRAM(S): at 06:20

## 2018-10-22 RX ADMIN — LOSARTAN POTASSIUM 50 MILLIGRAM(S): 100 TABLET, FILM COATED ORAL at 06:20

## 2018-10-22 RX ADMIN — TAMSULOSIN HYDROCHLORIDE 0.4 MILLIGRAM(S): 0.4 CAPSULE ORAL at 22:08

## 2018-10-22 RX ADMIN — PANTOPRAZOLE SODIUM 40 MILLIGRAM(S): 20 TABLET, DELAYED RELEASE ORAL at 06:20

## 2018-10-22 RX ADMIN — Medication 100 MILLIGRAM(S): at 22:08

## 2018-10-22 RX ADMIN — POLYETHYLENE GLYCOL 3350 17 GRAM(S): 17 POWDER, FOR SOLUTION ORAL at 06:20

## 2018-10-22 RX ADMIN — Medication 100 MILLIGRAM(S): at 14:01

## 2018-10-22 RX ADMIN — POLYETHYLENE GLYCOL 3350 17 GRAM(S): 17 POWDER, FOR SOLUTION ORAL at 18:28

## 2018-10-22 RX ADMIN — Medication 650 MILLIGRAM(S): at 12:00

## 2018-10-22 RX ADMIN — SENNA PLUS 2 TABLET(S): 8.6 TABLET ORAL at 22:09

## 2018-10-22 RX ADMIN — CLOPIDOGREL BISULFATE 75 MILLIGRAM(S): 75 TABLET, FILM COATED ORAL at 11:54

## 2018-10-22 RX ADMIN — Medication 81 MILLIGRAM(S): at 11:54

## 2018-10-22 RX ADMIN — ISOSORBIDE MONONITRATE 30 MILLIGRAM(S): 60 TABLET, EXTENDED RELEASE ORAL at 11:54

## 2018-10-22 RX ADMIN — Medication 650 MILLIGRAM(S): at 13:58

## 2018-10-22 NOTE — DISCHARGE NOTE ADULT - MEDICATION SUMMARY - MEDICATIONS TO STOP TAKING
I will STOP taking the medications listed below when I get home from the hospital:    potassium chloride 10 mEq oral tablet, extended release  -- 1 tab(s) by mouth every 12 hours I will STOP taking the medications listed below when I get home from the hospital:    potassium chloride 10 mEq oral tablet, extended release  -- 1 tab(s) by mouth every 12 hours    Eliquis 2.5 mg oral tablet  -- 1 tab(s) by mouth 2 times a day

## 2018-10-22 NOTE — DISCHARGE NOTE ADULT - SECONDARY DIAGNOSIS.
Hematoma of groin CAD (coronary artery disease) Afib Aortic stenosis HLD (hyperlipidemia) CHF (congestive heart failure)

## 2018-10-22 NOTE — PROGRESS NOTE ADULT - PROBLEM SELECTOR PLAN 1
- Bilateral groin and abdominal hematoma as seen on CT A/P   - With acute blood loss anemia s/p 1 pRBC transfusion 10/21/18, heparin ggt discontinued  - f/u repeat CBC and will transfuse if <8 Hgb  - Stable hemodynamically

## 2018-10-22 NOTE — DISCHARGE NOTE ADULT - COMMUNITY RESOURCES
HA reinstatement requested from [facility] p: [phone]

## 2018-10-22 NOTE — PROGRESS NOTE ADULT - PROBLEM SELECTOR PLAN 3
- s/p CABG 1995 and QI pRCA in 2018  - Troponins peaked, continue post-NSTEMI medical management as above

## 2018-10-22 NOTE — DISCHARGE NOTE ADULT - MEDICATION SUMMARY - MEDICATIONS TO TAKE
I will START or STAY ON the medications listed below when I get home from the hospital:    aspirin 81 mg oral delayed release tablet  -- 1 tab(s) by mouth once a day  -- Indication: For Coronary artery disease    losartan 50 mg oral tablet  -- 1 tab(s) by mouth once a day  -- Indication: For Hypertension    tamsulosin 0.4 mg oral capsule  -- 1 cap(s) by mouth once a day (at bedtime)  -- Indication: For BPH (benign prostatic hyperplasia)    isosorbide mononitrate 30 mg oral tablet, extended release  -- 1 tab(s) by mouth once a day  -- Indication: For Congestive heart failure    Eliquis 2.5 mg oral tablet  -- 1 tab(s) by mouth 2 times a day  -- Indication: For Atrial fibrillation    atorvastatin 20 mg oral tablet  -- 1 tab(s) by mouth once a day (at bedtime)  -- Indication: For Hyperlipidemia    clopidogrel 75 mg oral tablet  -- 1 tab(s) by mouth once a day  -- Indication: For Coronary artery disease    metoprolol succinate 25 mg oral tablet, extended release  -- 1 tab(s) by mouth once a day  -- Indication: For Congestive heart failure    senna oral tablet  -- 2 tab(s) by mouth once a day (at bedtime), As Needed -for constipation  HOLD for loose stools.   -- Indication: For Constipation    pantoprazole 40 mg oral delayed release tablet  -- 1 tab(s) by mouth once a day (before a meal)  -- Indication: For GERD (gastroesophageal reflux disease) I will START or STAY ON the medications listed below when I get home from the hospital:    aspirin 81 mg oral delayed release tablet  -- 1 tab(s) by mouth once a day  -- Indication: For Coronary artery disease    losartan 50 mg oral tablet  -- 1 tab(s) by mouth once a day  -- Indication: For Hypertension    tamsulosin 0.4 mg oral capsule  -- 1 cap(s) by mouth once a day (at bedtime)  -- Indication: For BPH (benign prostatic hyperplasia)    isosorbide mononitrate 30 mg oral tablet, extended release  -- 1 tab(s) by mouth once a day  -- Indication: For Congestive heart failure    atorvastatin 20 mg oral tablet  -- 1 tab(s) by mouth once a day (at bedtime)  -- Indication: For Hyperlipidemia    clopidogrel 75 mg oral tablet  -- 1 tab(s) by mouth once a day  -- Indication: For Coronary artery disease    metoprolol succinate 25 mg oral tablet, extended release  -- 1 tab(s) by mouth once a day  -- Indication: For Congestive heart failure    senna oral tablet  -- 2 tab(s) by mouth once a day (at bedtime), As Needed -for constipation  HOLD for loose stools.   -- Indication: For Constipation    pantoprazole 40 mg oral delayed release tablet  -- 1 tab(s) by mouth once a day (before a meal)  -- Indication: For GERD (gastroesophageal reflux disease)

## 2018-10-22 NOTE — PROGRESS NOTE ADULT - SUBJECTIVE AND OBJECTIVE BOX
SUBJECTIVE:     No complaints.    aspirin enteric coated 81 milliGRAM(s) Oral daily  clopidogrel Tablet 75 milliGRAM(s) Oral daily  isosorbide   mononitrate ER Tablet (IMDUR) 30 milliGRAM(s) Oral daily  losartan 50 milliGRAM(s) Oral daily  metoprolol succinate ER 25 milliGRAM(s) Oral daily  tamsulosin 0.4 milliGRAM(s) Oral at bedtime      Vital Signs Last 24 Hrs  T(C): 37.2 (22 Oct 2018 05:21), Max: 37.5 (21 Oct 2018 17:57)  T(F): 99 (22 Oct 2018 05:21), Max: 99.5 (21 Oct 2018 17:57)  HR: 64 (22 Oct 2018 05:00) (58 - 72)  BP: 145/58 (22 Oct 2018 05:00) (115/40 - 163/66)  BP(mean): 93 (22 Oct 2018 05:00) (63 - 117)  RR: 16 (22 Oct 2018 05:00) (16 - 17)  SpO2: 98% (22 Oct 2018 05:00) (95% - 99%)  I&O's Detail    21 Oct 2018 07:01  -  22 Oct 2018 07:00  --------------------------------------------------------  IN:    Oral Fluid: 450 mL    Packed Red Blood Cells: 350 mL  Total IN: 800 mL    OUT:    Voided: 250 mL  Total OUT: 250 mL    Total NET: 550 mL          General: NAD, resting comfortably in bed  C/V: NSR  Pulm: Nonlabored breathing, no respiratory distress  Abd: mild lower abdominal pain, appropriate from incisional tenderness. Incisions are clean dry and intact. Firmness over incision extending to b/l inguinal region. No signs of expanding hematoma.  Groin: ecchymosis of scrotum.  Extrem: WWP, no edema, SCDs in place    LABS:                        8.0    5.1   )-----------( 173      ( 22 Oct 2018 05:52 )             25.2     10-22    139  |  103  |  24<H>  ----------------------------<  103<H>  4.0   |  27  |  0.91    Ca    9.4      22 Oct 2018 05:52  Mg     2.3     10-22      PT/INR - ( 21 Oct 2018 03:39 )   PT: 15.8 sec;   INR: 1.41          PTT - ( 21 Oct 2018 03:39 )  PTT:69.6 sec

## 2018-10-22 NOTE — DISCHARGE NOTE ADULT - CARE PROVIDERS DIRECT ADDRESSES
[email or web address]_Unknown [email or web address],DirectAddress_Unknown,DirectAddress_Unknown

## 2018-10-22 NOTE — DISCHARGE NOTE ADULT - PLAN OF CARE
Follow up with your cardiologist and follow Follow up with your cardiologist and take your home medications You developed chest pain after your operation and had elevated heart enzymes with no changes on your EKG. You were treated with medications that anticoagulate your blood including aspirin, plavix, and heparin. Because of your hematoma and the higher risk of bleed, you will stop your home Eliquis and only take plavix and aspirin until you follow with your doctors outpatient. You develoepd a blood collection in your abdomen and your groin after your surgery. This dropped your red blood cell counts and required a blood transfusion. Your blood counts after the transfusion were stable. You will have your blood counts checked at your follow up appointment with Dr. Albarado or Dr. Knutson. You have a recent history of stenting and developed chest pain after your procedure. You will continue aspirin and plavix and follow up with your cardiologist. You have atrial fibrillation but will STOP your home Eliquis until you follow up with your cardiologist or surgeon outpatient because of your risk of bleed. You have a history of aortic stenosis and have had aortic valvuloplasty and TAVR in the past. Follow up with your cardiologist. Continue your home medications and follow up with your primary care doctor. You developed a blood collection in your abdomen and your groin after your surgery. This dropped your red blood cell counts and required a blood transfusion. Your blood counts after the transfusion were stable. You will have your blood counts checked at your follow up appointment with Dr. Albarado or Dr. Knutsno.

## 2018-10-22 NOTE — DISCHARGE NOTE ADULT - CARE PROVIDER_API CALL
Calvin Knutson), Internal Medicine  [address] [id number]  Phone: [phone]  Fax: [phone]    Chantel Albarado), Surgery; Surgical Oncology  [address]  [address] [place] [address]  Phone: [phone]  Fax: [phone] Calvin Knutson), Internal Medicine  [address] [id number]  Phone: [phone]  Fax: [phone]    Chantel Albarado), Surgery; Surgical Oncology  [address]  [address] [place] [address]  Phone: [phone]  Fax: [phone]    Bacilio Locke  [address] [place] [address]  Phone: [phone]  Fax: (       -

## 2018-10-22 NOTE — DISCHARGE NOTE ADULT - CARE PLAN
Principal Discharge DX:	ACS (acute coronary syndrome)  Goal:	Follow up with your cardiologist and follow  Secondary Diagnosis:	Hematoma of groin  Secondary Diagnosis:	CAD (coronary artery disease)  Secondary Diagnosis:	Afib  Secondary Diagnosis:	Aortic stenosis  Secondary Diagnosis:	HLD (hyperlipidemia)  Secondary Diagnosis:	CHF (congestive heart failure) Principal Discharge DX:	ACS (acute coronary syndrome)  Goal:	Follow up with your cardiologist and take your home medications  Assessment and plan of treatment:	You developed chest pain after your operation and had elevated heart enzymes with no changes on your EKG. You were treated with medications that anticoagulate your blood including aspirin, plavix, and heparin. Because of your hematoma and the higher risk of bleed, you will stop your home Eliquis and only take plavix and aspirin until you follow with your doctors outpatient.  Secondary Diagnosis:	Hematoma of groin  Assessment and plan of treatment:	You develoepd a blood collection in your abdomen and your groin after your surgery. This dropped your red blood cell counts and required a blood transfusion. Your blood counts after the transfusion were stable. You will have your blood counts checked at your follow up appointment with Dr. Albarado or Dr. Knutson.  Secondary Diagnosis:	CAD (coronary artery disease)  Assessment and plan of treatment:	You have a recent history of stenting and developed chest pain after your procedure. You will continue aspirin and plavix and follow up with your cardiologist.  Secondary Diagnosis:	Afib  Assessment and plan of treatment:	You have atrial fibrillation but will STOP your home Eliquis until you follow up with your cardiologist or surgeon outpatient because of your risk of bleed.  Secondary Diagnosis:	Aortic stenosis  Assessment and plan of treatment:	You have a history of aortic stenosis and have had aortic valvuloplasty and TAVR in the past. Follow up with your cardiologist.  Secondary Diagnosis:	HLD (hyperlipidemia)  Assessment and plan of treatment:	Continue your home medications and follow up with your primary care doctor.  Secondary Diagnosis:	CHF (congestive heart failure)  Assessment and plan of treatment:	Continue your home medications and follow up with your primary care doctor. Principal Discharge DX:	ACS (acute coronary syndrome)  Goal:	Follow up with your cardiologist and take your home medications  Assessment and plan of treatment:	You developed chest pain after your operation and had elevated heart enzymes with no changes on your EKG. You were treated with medications that anticoagulate your blood including aspirin, plavix, and heparin. Because of your hematoma and the higher risk of bleed, you will stop your home Eliquis and only take plavix and aspirin until you follow with your doctors outpatient.  Secondary Diagnosis:	Hematoma of groin  Assessment and plan of treatment:	You developed a blood collection in your abdomen and your groin after your surgery. This dropped your red blood cell counts and required a blood transfusion. Your blood counts after the transfusion were stable. You will have your blood counts checked at your follow up appointment with Dr. Albarado or Dr. Knutson.  Secondary Diagnosis:	CAD (coronary artery disease)  Assessment and plan of treatment:	You have a recent history of stenting and developed chest pain after your procedure. You will continue aspirin and plavix and follow up with your cardiologist.  Secondary Diagnosis:	Afib  Assessment and plan of treatment:	You have atrial fibrillation but will STOP your home Eliquis until you follow up with your cardiologist or surgeon outpatient because of your risk of bleed.  Secondary Diagnosis:	Aortic stenosis  Assessment and plan of treatment:	You have a history of aortic stenosis and have had aortic valvuloplasty and TAVR in the past. Follow up with your cardiologist.  Secondary Diagnosis:	HLD (hyperlipidemia)  Assessment and plan of treatment:	Continue your home medications and follow up with your primary care doctor.  Secondary Diagnosis:	CHF (congestive heart failure)  Assessment and plan of treatment:	Continue your home medications and follow up with your primary care doctor.

## 2018-10-22 NOTE — PROGRESS NOTE ADULT - ASSESSMENT
89yo M with CAD, A-fib (on Eliquis), CHF, GERD, HLD, BPH, s/p bilateral open inguinal hernia repair (10/18) complicated by post operative chest pain.    - Appropriate response to transfusion overnight; Hgb 8.0 (7.0 yesterday).  - Continue bowel regimen  - Surgery will continue to follow.  - Plan discussed with attending and chief resident.

## 2018-10-22 NOTE — DISCHARGE NOTE ADULT - HOSPITAL COURSE
Mr. 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 Patient underwent uncomplicated surgery and developed post op chest pain on POD #1. 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 Patient was managed as NSTEMI with heparin drip. On POD 3, patient had decreased Hgb, CT A/P showed abdominal/groin hematoma, and he was transfused 1pRBC. Heparin was discontinued and patient remained stable. Patient was continued on aspirin, Plavix, metoprolol 25mg daily, and atorvastatin. Patient remained hemodynamically stable with stable h/h and was discharged home with follow up with Dr. Albarado and Dr. Knutson for cardiology, and all questions were answered. Mr. 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 Patient underwent uncomplicated surgery and developed post op chest pain on POD #1. 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 Patient was managed as NSTEMI with heparin drip. On POD 3, patient had decreased Hgb, CT A/P showed abdominal/groin hematoma, and he was transfused 2pRBC. Heparin was discontinued and patient remained stable. Patient was continued on aspirin, Plavix, metoprolol 25mg daily, and atorvastatin. Patient remained hemodynamically stable with stable h/h and was discharged home with follow up with Dr. Albarado and Dr. Knutson for cardiology, and all questions were answered.

## 2018-10-22 NOTE — DISCHARGE NOTE ADULT - PROVIDER TOKENS
TOKEN:'[id number]',TOKEN:'[id number]' TOKEN:'[id number]',TOKEN:'[id number]',FREE:[LAST:[Cornelia],FIRST:[Bacilio],PHONE:[phone]],FAX:[(   )    -],ADDRESS:[[address] [place] [address]]]

## 2018-10-22 NOTE — DISCHARGE NOTE ADULT - HOME CARE AGENCY
Home Care agency: [facility], [facility].  [phone]. For SOC tomorrow 10/24/2018

## 2018-10-22 NOTE — PROGRESS NOTE ADULT - ASSESSMENT
Mr. 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

## 2018-10-22 NOTE — DISCHARGE NOTE ADULT - ADDITIONAL INSTRUCTIONS
- Follow up with Dr. Chantel Albarado's office after 1 week. Dr. Albarado will check a repeat CBC to check your blood counts.    - Wound care: You can leave your wound without any dressing. You should wash the wound lightly with soap and water daily. Staples can be removed after post op day 14 (2 weeks after surgery) at Dr. Albarado's office. - Follow with Dr. Knutson for cardiology on Nov 1 at 12 PM.  - Follow up with Dr. Chantel Albarado's office on Friday, October 26, 2018 at 11 AM. Dr. Albarado will check a repeat CBC to check your blood counts. At [address] [place] [address]. Call [phone].    - Wound care: You can leave your wound without any dressing. You should wash the wound lightly with soap and water daily. Staples can be removed after post op day 14 (2 weeks after surgery) at Dr. Albarado's office.

## 2018-10-22 NOTE — PROGRESS NOTE ADULT - SUBJECTIVE AND OBJECTIVE BOX
OVERNIGHT EVENTS: MONSE    SUBJECTIVE / INTERVAL HPI: Patient seen and examined at bedside. Patient reports pain at site of hernia repair but no CP, no SOB, no cough.     VITAL SIGNS:  Vital Signs Last 24 Hrs  T(C): 37.2 (22 Oct 2018 05:21), Max: 37.5 (21 Oct 2018 17:57)  T(F): 99 (22 Oct 2018 05:21), Max: 99.5 (21 Oct 2018 17:57)  HR: 64 (22 Oct 2018 08:58) (58 - 72)  BP: 149/77 (22 Oct 2018 08:58) (129/55 - 163/66)  BP(mean): 114 (22 Oct 2018 08:58) (78 - 117)  RR: 17 (22 Oct 2018 08:58) (16 - 17)  SpO2: 98% (22 Oct 2018 08:58) (95% - 99%)    PHYSICAL EXAM:    General: Well developed, well nourished, no acute distress  HEENT: NC/AT; MMM  Neck: supple  Cardiovascular: +S1/S2, RRR, no murmurs, rubs, gallops  Respiratory: CTA B/L; no W/R/R  Gastrointestinal: soft, non-distended abdomen, tenderness to light and deep palpation at lower abd incisional site, c/d/i  Extremities: WWP; no edema, clubbing or cyanosis  Vascular: 2+ radial and pedal pulses  Neurological: AAOx3; no focal deficits    MEDICATIONS:  MEDICATIONS  (STANDING):  aspirin enteric coated 81 milliGRAM(s) Oral daily  atorvastatin 20 milliGRAM(s) Oral at bedtime  clopidogrel Tablet 75 milliGRAM(s) Oral daily  docusate sodium 100 milliGRAM(s) Oral three times a day  isosorbide   mononitrate ER Tablet (IMDUR) 30 milliGRAM(s) Oral daily  losartan 50 milliGRAM(s) Oral daily  metoprolol succinate ER 25 milliGRAM(s) Oral daily  pantoprazole    Tablet 40 milliGRAM(s) Oral before breakfast  polyethylene glycol 3350 17 Gram(s) Oral two times a day  senna 2 Tablet(s) Oral at bedtime  tamsulosin 0.4 milliGRAM(s) Oral at bedtime    MEDICATIONS  (PRN):  acetaminophen   Tablet .. 650 milliGRAM(s) Oral every 6 hours PRN Temp greater or equal to 38C (100.4F)  acetaminophen   Tablet .. 650 milliGRAM(s) Oral every 6 hours PRN Moderate Pain (4 - 6)  morphine  - Injectable 1 milliGRAM(s) IV Push every 4 hours PRN Severe Pain (7 - 10)  ondansetron Injectable 4 milliGRAM(s) IV Push every 6 hours PRN Nausea    ALLERGIES:  Allergies    No Known Allergies    LABS:                        8.0    5.1   )-----------( 173      ( 22 Oct 2018 05:52 )             25.2     10-22    139  |  103  |  24<H>  ----------------------------<  103<H>  4.0   |  27  |  0.91    Ca    9.4      22 Oct 2018 05:52  Mg     2.3     10-22    PT/INR - ( 21 Oct 2018 03:39 )   PT: 15.8 sec;   INR: 1.41       PTT - ( 21 Oct 2018 03:39 )  PTT:69.6 sec    RADIOLOGY & ADDITIONAL TESTS: Reviewed.    EXAM:  ECHOCARDIOGRAM W CONTRAST                          *** ADDENDUM 10/20/2018  ***    Patient Height: 160.0 cm  Patient Weight: 67.0 kg  Systolic Pressure: 158 mmHg  Diastolic Pressure: 53 mmHg  BSA: 1.7 m^2  Interpretation Summary  The left atrium is moderately dilated. The left atrial volume index is   40.6 cc/m2 (normal <34cc/m2).  There is moderate concentric left ventricular   hypertrophy. The left ventricle is mildly dilated.The apical anterior,   apical lateral walls and true apex are severely hypokinetic. The apical septum   and apical inferior walls appear dyskinetic and aneurysmal. The left   ventricular ejection fraction is 35%.  The right atrium is dilated.The right   ventricle is normal in size and function.Transcatheter valve in the aortic position.   There is trace aortic regurgitation. The mean pressure gradient is 5 mmHg. The   calculated stroke volume index is 39 cc/m2 (normal >35cc/m2).  There is   severe mitral annular calcification. Structurally normal mitral valve. There is   mild to moderate mitral regurgitation.  Structurally normal tricuspid valve.   There is mild tricuspid regurgitation. Structurally normal pulmonic valve.   There is trace pulmonic regurgitation.The pulmonary artery systolic pressure is estimated to be at least 37 mmHg.No aortic root dilatation.The aortic   arch is not well seen.There is no pericardial effusion.  Procedure Details  A complete two-dimensional transthoracic echocardiogram was performed (2D,  M-mode, spectral and color flow doppler).  Study Quality: Poor.  There was technical limitations during this study due to patients body  habitas.  Contrast injection with Lumason was performed.  Left Ventricle  There is moderate concentric left ventricular hypertrophy.  The left ventricle is mildly dilated.  The apical anterior, apical lateral walls and true apex are severely  hypokinetic. The apical septum and apical inferior walls appear dyskinetic  and aneurysmal.  The left ventricular ejection fraction is 35%.  Left Atrium  The left atrium is moderately dilated.  The left atrial volume index is 40.6 cc/m2 (normal <34cc/m2)  Right Atrium  The right atrium is dilated.  Right Ventricle  The right ventricle is normal in size and function.  Aortic Valve  Transcatheter valve in the aortic position.  There is trace aortic regurgitation.  The mean pressure gradient is 5 mmHg.  The calculated stroke volume index is 39 cc/m2 (normal >35cc/m2).  Mitral Valve  There is severe mitral annular calcification.  Structurally normal mitral valve.  There is mild to moderate mitral regurgitation.  Tricuspid Valve  Structurally normal tricuspid valve.  There is mild tricuspid regurgitation.  The pulmonary artery systolic pressure is estimated to be at least 37   mmHg.  Pulmonic Valve  Structurally normal pulmonic valve.  There is trace pulmonic regurgitation.  Arteries and Venous System  No aortic root dilatation.  The aortic arch is not well seen.  The inferior vena cava was not well visualized.  Pericardium / Pleura  There is no pericardial effusion.

## 2018-10-22 NOTE — DISCHARGE NOTE ADULT - PATIENT PORTAL LINK FT
You can access the [facility] Patient Portal, offered by [facility], by registering with the following website: [email or web address]

## 2018-10-23 ENCOUNTER — INBOUND DOCUMENT (OUTPATIENT)
Age: 83
End: 2018-10-23

## 2018-10-23 VITALS — TEMPERATURE: 99 F

## 2018-10-23 PROBLEM — K46.9 UNSPECIFIED ABDOMINAL HERNIA WITHOUT OBSTRUCTION OR GANGRENE: Chronic | Status: ACTIVE | Noted: 2018-10-17

## 2018-10-23 LAB
ANION GAP SERPL CALC-SCNC: 13 MMOL/L — SIGNIFICANT CHANGE UP (ref 5–17)
APTT BLD: 32.8 SEC — SIGNIFICANT CHANGE UP (ref 27.5–37.4)
BUN SERPL-MCNC: 25 MG/DL — HIGH (ref 7–23)
CALCIUM SERPL-MCNC: 9.6 MG/DL — SIGNIFICANT CHANGE UP (ref 8.4–10.5)
CHLORIDE SERPL-SCNC: 101 MMOL/L — SIGNIFICANT CHANGE UP (ref 96–108)
CO2 SERPL-SCNC: 24 MMOL/L — SIGNIFICANT CHANGE UP (ref 22–31)
CREAT SERPL-MCNC: 0.9 MG/DL — SIGNIFICANT CHANGE UP (ref 0.5–1.3)
GLUCOSE SERPL-MCNC: 101 MG/DL — HIGH (ref 70–99)
HCT VFR BLD CALC: 31.1 % — LOW (ref 39–50)
HGB BLD-MCNC: 9.7 G/DL — LOW (ref 13–17)
INR BLD: 1.1 — SIGNIFICANT CHANGE UP (ref 0.88–1.16)
MAGNESIUM SERPL-MCNC: 2.2 MG/DL — SIGNIFICANT CHANGE UP (ref 1.6–2.6)
MCHC RBC-ENTMCNC: 29.4 PG — SIGNIFICANT CHANGE UP (ref 27–34)
MCHC RBC-ENTMCNC: 31.2 G/DL — LOW (ref 32–36)
MCV RBC AUTO: 94.2 FL — SIGNIFICANT CHANGE UP (ref 80–100)
PLATELET # BLD AUTO: 197 K/UL — SIGNIFICANT CHANGE UP (ref 150–400)
POTASSIUM SERPL-MCNC: 4.3 MMOL/L — SIGNIFICANT CHANGE UP (ref 3.5–5.3)
POTASSIUM SERPL-SCNC: 4.3 MMOL/L — SIGNIFICANT CHANGE UP (ref 3.5–5.3)
PROTHROM AB SERPL-ACNC: 12.2 SEC — SIGNIFICANT CHANGE UP (ref 9.8–12.7)
RBC # BLD: 3.3 M/UL — LOW (ref 4.2–5.8)
RBC # FLD: 17.1 % — HIGH (ref 10.3–16.9)
SODIUM SERPL-SCNC: 138 MMOL/L — SIGNIFICANT CHANGE UP (ref 135–145)
WBC # BLD: 5 K/UL — SIGNIFICANT CHANGE UP (ref 3.8–10.5)
WBC # FLD AUTO: 5 K/UL — SIGNIFICANT CHANGE UP (ref 3.8–10.5)

## 2018-10-23 PROCEDURE — 93010 ELECTROCARDIOGRAM REPORT: CPT

## 2018-10-23 PROCEDURE — 99238 HOSP IP/OBS DSCHRG MGMT 30/<: CPT

## 2018-10-23 RX ORDER — INFLUENZA VIRUS VACCINE 15; 15; 15; 15 UG/.5ML; UG/.5ML; UG/.5ML; UG/.5ML
0.5 SUSPENSION INTRAMUSCULAR ONCE
Qty: 0 | Refills: 0 | Status: COMPLETED | OUTPATIENT
Start: 2018-10-23 | End: 2018-10-23

## 2018-10-23 RX ORDER — LOSARTAN POTASSIUM 100 MG/1
50 TABLET, FILM COATED ORAL ONCE
Qty: 0 | Refills: 0 | Status: COMPLETED | OUTPATIENT
Start: 2018-10-23 | End: 2018-10-23

## 2018-10-23 RX ORDER — APIXABAN 2.5 MG/1
1 TABLET, FILM COATED ORAL
Qty: 0 | Refills: 0 | COMMUNITY

## 2018-10-23 RX ADMIN — INFLUENZA VIRUS VACCINE 0.5 MILLILITER(S): 15; 15; 15; 15 SUSPENSION INTRAMUSCULAR at 12:50

## 2018-10-23 RX ADMIN — PANTOPRAZOLE SODIUM 40 MILLIGRAM(S): 20 TABLET, DELAYED RELEASE ORAL at 06:50

## 2018-10-23 RX ADMIN — LOSARTAN POTASSIUM 50 MILLIGRAM(S): 100 TABLET, FILM COATED ORAL at 06:49

## 2018-10-23 RX ADMIN — Medication 650 MILLIGRAM(S): at 13:14

## 2018-10-23 RX ADMIN — Medication 81 MILLIGRAM(S): at 11:16

## 2018-10-23 RX ADMIN — LOSARTAN POTASSIUM 50 MILLIGRAM(S): 100 TABLET, FILM COATED ORAL at 13:14

## 2018-10-23 RX ADMIN — ISOSORBIDE MONONITRATE 30 MILLIGRAM(S): 60 TABLET, EXTENDED RELEASE ORAL at 11:16

## 2018-10-23 RX ADMIN — CLOPIDOGREL BISULFATE 75 MILLIGRAM(S): 75 TABLET, FILM COATED ORAL at 11:16

## 2018-10-23 RX ADMIN — Medication 100 MILLIGRAM(S): at 06:49

## 2018-10-23 RX ADMIN — Medication 25 MILLIGRAM(S): at 06:49

## 2018-10-23 RX ADMIN — POLYETHYLENE GLYCOL 3350 17 GRAM(S): 17 POWDER, FOR SOLUTION ORAL at 06:50

## 2018-10-23 NOTE — PROGRESS NOTE ADULT - PROBLEM SELECTOR PLAN 2
Admitted to cardiology for post-op chest pain with troponins elevated (peaked). ACS rule out. On heparin gtt for 48 hours.   - Discontinued heparin gtt in the setting of acute blood loss 2/2 hematoma  - Continue with ASA, Plavix for post NSTEMI management  - Continue metoprolol  - Echo: The apical anterior, apical lateral walls and true apex are severely hypokinetic. 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 LVEF 35%.
- s/p CABG 1995 and QI pRCA in 2018  - Troponins peaked, continue post-NSTEMI medical management as above
Admitted to cardiology for post-op chest pain with troponins elevated (peaked). ACS rule out. On heparin gtt for 48 hours.   - Discontinued heparin gtt in the setting of acute blood loss 2/2 hematoma  - Continue with ASA, Plavix for post NSTEMI management  - Continue metoprolol  - Echo: The apical anterior, apical lateral walls and true apex are severely hypokinetic. 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 LVEF 35%.

## 2018-10-23 NOTE — PHYSICAL THERAPY INITIAL EVALUATION ADULT - PERTINENT HX OF CURRENT PROBLEM, REHAB EVAL
Mr. 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

## 2018-10-23 NOTE — PROGRESS NOTE ADULT - PROBLEM SELECTOR PLAN 1
- Bilateral groin and abdominal hematoma as seen on CT A/P   - With acute blood loss anemia s/p 1 pRBC transfusion 10/21/18, heparin ggt discontinued, additional 1 pRBC 10/22/18  - Stable hemodynamically

## 2018-10-23 NOTE — PROGRESS NOTE ADULT - PROBLEM SELECTOR PLAN 8
- Holding home tamsulosin 0.4mg

## 2018-10-23 NOTE — PROGRESS NOTE ADULT - PROBLEM SELECTOR PROBLEM 4
Afib
Afib
CHF (congestive heart failure)

## 2018-10-23 NOTE — PROGRESS NOTE ADULT - PROBLEM SELECTOR PLAN 9
F: No IVF  E: Replete PRN  N: DASH/TLC  FULL CODE  Dispo: transfer to [facility]  Prophy: heparin ggt
F: No IVF  E: Replete PRN  N: DASH/TLC  FULL CODE  Dispo: Discharge pending stable h/h  Prophy: heparin ggt
F: No IVF  E: Replete PRN  N: DASH/TLC  FULL CODE  Dispo: transfer to [facility]  Prophy: heparin ggt

## 2018-10-23 NOTE — PROGRESS NOTE ADULT - PROBLEM SELECTOR PROBLEM 5
CHF (congestive heart failure)
Aortic stenosis
CHF (congestive heart failure)

## 2018-10-23 NOTE — PROGRESS NOTE ADULT - PROBLEM SELECTOR PLAN 10
1) PCP Contacted on Admission: (Y/N) --> Name & Phone #:  2) Date of Contact with PCP:  3) PCP Contacted at Discharge: (Y/N, N/A)  4) Summary of Handoff Given to PCP:   5) Post-Discharge Appointment Date and Location:

## 2018-10-23 NOTE — PROGRESS NOTE ADULT - PROBLEM SELECTOR PLAN 7
- Continue home atorvastatin 20mg    #GERD  - Continue home ppi
- Continue home atorvastatin 20mg    #GERD  - Continue home ppi
- Continue home protonix 40mg

## 2018-10-23 NOTE — PROGRESS NOTE ADULT - REASON FOR ADMISSION
b/l inguinal hernia repair
bilateral inguinal hernia repair
bilateral inguinal hernia repair, post op chest pain
Bilateral inguinal hernia repair
Post op chest pain
bilateral inguinal hernia repair
bilateral inguinal hernia repair, post op chest pain

## 2018-10-23 NOTE — PHYSICAL THERAPY INITIAL EVALUATION ADULT - GENERAL OBSERVATIONS, REHAB EVAL
Pt received supine, +telemetry, +pulse ox, +heplock, NAD, agreeable to PT. [place]  Jean (#[id number]) utilized for Filipino translation.

## 2018-10-23 NOTE — PROGRESS NOTE ADULT - PROBLEM SELECTOR PLAN 4
- Rate controlled, continue metoprolol 25mg daily  - Holding heparin gtt in the setting of blood loss, will restart home eliquis when surgery clears
- No acute CHF  - systolic and diastolic dysfunction, EF 35%  - f/u repeat echo
- Rate controlled, continue metoprolol 25mg daily  - Holding heparin gtt in the setting of blood loss, will restart home Eliquis in outpatient setting per surgery team

## 2018-10-23 NOTE — PROGRESS NOTE ADULT - SUBJECTIVE AND OBJECTIVE BOX
OVERNIGHT EVENTS: MONSE    SUBJECTIVE / INTERVAL HPI: Patient seen and examined at bedside. Feels well, denies chest pain, denies SOB, denies cough. Continued tenderness at site of hernia repair.     VITAL SIGNS:  Vital Signs Last 24 Hrs  T(C): 36.9 (23 Oct 2018 06:18), Max: 37.4 (22 Oct 2018 09:47)  T(F): 98.5 (23 Oct 2018 06:18), Max: 99.3 (22 Oct 2018 09:47)  HR: 68 (23 Oct 2018 08:11) (54 - 68)  BP: 137/74 (23 Oct 2018 08:11) (116/42 - 170/62)  BP(mean): 116 (23 Oct 2018 08:11) (69 - 132)  RR: 14 (23 Oct 2018 08:11) (14 - 18)  SpO2: 95% (23 Oct 2018 08:11) (95% - 99%)    PHYSICAL EXAM:    General: Well developed, well nourished, no acute distress  HEENT: NC/AT; MMM  Neck: supple  Cardiovascular: +S1/S2, RRR, no murmurs, rubs, gallops  Respiratory: CTA B/L; no W/R/R  Gastrointestinal: soft, non-distended abdomen, tenderness to light and deep palpation at lower abd incisional site, c/d/i  Extremities: WWP; no edema, clubbing or cyanosis  Vascular: 2+ radial and pedal pulses  Neurological: AAOx3; no focal deficits    MEDICATIONS:  MEDICATIONS  (STANDING):  aspirin enteric coated 81 milliGRAM(s) Oral daily  atorvastatin 20 milliGRAM(s) Oral at bedtime  clopidogrel Tablet 75 milliGRAM(s) Oral daily  docusate sodium 100 milliGRAM(s) Oral three times a day  isosorbide   mononitrate ER Tablet (IMDUR) 30 milliGRAM(s) Oral daily  losartan 50 milliGRAM(s) Oral daily  metoprolol succinate ER 25 milliGRAM(s) Oral daily  pantoprazole    Tablet 40 milliGRAM(s) Oral before breakfast  polyethylene glycol 3350 17 Gram(s) Oral two times a day  senna 2 Tablet(s) Oral at bedtime  tamsulosin 0.4 milliGRAM(s) Oral at bedtime    MEDICATIONS  (PRN):  acetaminophen   Tablet .. 650 milliGRAM(s) Oral every 6 hours PRN Temp greater or equal to 38C (100.4F)  acetaminophen   Tablet .. 650 milliGRAM(s) Oral every 6 hours PRN Moderate Pain (4 - 6)  morphine  - Injectable 1 milliGRAM(s) IV Push every 4 hours PRN Severe Pain (7 - 10)  ondansetron Injectable 4 milliGRAM(s) IV Push every 6 hours PRN Nausea      ALLERGIES:  Allergies    No Known Allergies    LABS:                        9.7    5.0   )-----------( 197      ( 23 Oct 2018 07:02 )             31.1     10-23    138  |  101  |  25<H>  ----------------------------<  101<H>  4.3   |  24  |  0.90    Ca    9.6      23 Oct 2018 07:02  Mg     2.2     10-23    PT/INR - ( 23 Oct 2018 07:02 )   PT: 12.2 sec;   INR: 1.10        PTT - ( 23 Oct 2018 07:02 )  PTT:32.8 sec    RADIOLOGY & ADDITIONAL TESTS: Reviewed.

## 2018-10-23 NOTE — PROGRESS NOTE ADULT - PROBLEM SELECTOR PROBLEM 7
HLD (hyperlipidemia)
GERD (gastroesophageal reflux disease)
HLD (hyperlipidemia)

## 2018-10-23 NOTE — PROGRESS NOTE ADULT - PROBLEM SELECTOR PLAN 6
- s/p aortic valvuloplasty 4/2018 and TAVR 5/2018
- Continue home atorvastatin 20mg
- s/p aortic valvuloplasty 4/2018 and TAVR 5/2018

## 2018-10-23 NOTE — PROGRESS NOTE ADULT - PROBLEM SELECTOR PROBLEM 2
CAD (coronary artery disease)
R/O ACS (acute coronary syndrome)
R/O ACS (acute coronary syndrome)

## 2018-10-23 NOTE — PROGRESS NOTE ADULT - SUBJECTIVE AND OBJECTIVE BOX
SUBJECTIVE:     Hgb 8.0 on 10/22 AM, repeat during afternoon was 7.6. Patient was transfused with 1 pRBC yesterday at 9 PM. Pending AM hgb.    aspirin enteric coated 81 milliGRAM(s) Oral daily  clopidogrel Tablet 75 milliGRAM(s) Oral daily  isosorbide   mononitrate ER Tablet (IMDUR) 30 milliGRAM(s) Oral daily  losartan 50 milliGRAM(s) Oral daily  metoprolol succinate ER 25 milliGRAM(s) Oral daily  tamsulosin 0.4 milliGRAM(s) Oral at bedtime      Vital Signs Last 24 Hrs  T(C): 36.9 (22 Oct 2018 22:59), Max: 37.4 (22 Oct 2018 09:47)  T(F): 98.5 (22 Oct 2018 22:59), Max: 99.3 (22 Oct 2018 09:47)  HR: 60 (23 Oct 2018 05:23) (54 - 64)  BP: 156/104 (23 Oct 2018 05:23) (116/42 - 170/62)  BP(mean): 132 (23 Oct 2018 05:23) (69 - 132)  RR: 16 (23 Oct 2018 05:23) (16 - 18)  SpO2: 96% (23 Oct 2018 05:23) (96% - 99%)  I&O's Detail    21 Oct 2018 07:01  -  22 Oct 2018 07:00  --------------------------------------------------------  IN:    Oral Fluid: 450 mL    Packed Red Blood Cells: 350 mL  Total IN: 800 mL    OUT:    Voided: 250 mL  Total OUT: 250 mL    Total NET: 550 mL      22 Oct 2018 07:01  -  23 Oct 2018 06:31  --------------------------------------------------------  IN:    Oral Fluid: 540 mL  Total IN: 540 mL    OUT:    Voided: 150 mL  Total OUT: 150 mL    Total NET: 390 mL        General: NAD, resting comfortably in bed  C/V: NSR  Pulm: Nonlabored breathing, no respiratory distress  Abd: mild lower abdominal pain, appropriate from incisional tenderness. Incisions are clean dry and intact. Firmness over incision extending to b/l inguinal region. No signs of expanding hematoma. No changes.  Groin: ecchymosis of scrotum.  Extrem: WWP, no edema, SCDs in place  LABS:                        7.6    5.8   )-----------( 180      ( 22 Oct 2018 15:57 )             23.9     10-22    139  |  103  |  24<H>  ----------------------------<  103<H>  4.0   |  27  |  0.91    Ca    9.4      22 Oct 2018 05:52  Mg     2.3     10-22

## 2018-10-23 NOTE — PROGRESS NOTE ADULT - ASSESSMENT
89yo M with CAD, A-fib (on Eliquis), CHF, GERD, HLD, BPH, s/p bilateral open inguinal hernia repair (10/18) complicated by post operative chest pain.    - Pending AM H/H  - Continue bowel regimen  - Surgery will continue to follow.  - Plan discussed with attending and chief resident. 89yo M with CAD, A-fib (on Eliquis), CHF, GERD, HLD, BPH, s/p bilateral open inguinal hernia repair (10/18) complicated by post operative chest pain.    - Pending AM H/H after 1 pRBC transfusion yesterday. If hgb stable (8.0+), agree with discharge per primary team.  - Continue bowel regimen  - Surgery will continue to follow.  - Plan discussed with attending and chief resident.

## 2018-10-23 NOTE — PROGRESS NOTE ADULT - PROBLEM SELECTOR PROBLEM 1
Hematoma of groin
R/O ACS (acute coronary syndrome)
Hematoma of groin

## 2018-10-24 PROCEDURE — G0179 MD RECERTIFICATION HHA PT: CPT

## 2018-10-25 LAB
CULTURE RESULTS: SIGNIFICANT CHANGE UP
SPECIMEN SOURCE: SIGNIFICANT CHANGE UP

## 2018-10-29 DIAGNOSIS — K21.9 GASTRO-ESOPHAGEAL REFLUX DISEASE WITHOUT ESOPHAGITIS: ICD-10-CM

## 2018-10-29 DIAGNOSIS — I35.0 NONRHEUMATIC AORTIC (VALVE) STENOSIS: ICD-10-CM

## 2018-10-29 DIAGNOSIS — I50.42 CHRONIC COMBINED SYSTOLIC (CONGESTIVE) AND DIASTOLIC (CONGESTIVE) HEART FAILURE: ICD-10-CM

## 2018-10-29 DIAGNOSIS — I11.0 HYPERTENSIVE HEART DISEASE WITH HEART FAILURE: ICD-10-CM

## 2018-10-29 DIAGNOSIS — I25.2 OLD MYOCARDIAL INFARCTION: ICD-10-CM

## 2018-10-29 DIAGNOSIS — L76.32 POSTPROCEDURAL HEMATOMA OF SKIN AND SUBCUTANEOUS TISSUE FOLLOWING OTHER PROCEDURE: ICD-10-CM

## 2018-10-29 DIAGNOSIS — Z79.01 LONG TERM (CURRENT) USE OF ANTICOAGULANTS: ICD-10-CM

## 2018-10-29 DIAGNOSIS — I21.A1 MYOCARDIAL INFARCTION TYPE 2: ICD-10-CM

## 2018-10-29 DIAGNOSIS — Z95.1 PRESENCE OF AORTOCORONARY BYPASS GRAFT: ICD-10-CM

## 2018-10-29 DIAGNOSIS — N40.0 BENIGN PROSTATIC HYPERPLASIA WITHOUT LOWER URINARY TRACT SYMPTOMS: ICD-10-CM

## 2018-10-29 DIAGNOSIS — I48.91 UNSPECIFIED ATRIAL FIBRILLATION: ICD-10-CM

## 2018-10-29 DIAGNOSIS — E78.5 HYPERLIPIDEMIA, UNSPECIFIED: ICD-10-CM

## 2018-10-29 DIAGNOSIS — D62 ACUTE POSTHEMORRHAGIC ANEMIA: ICD-10-CM

## 2018-10-29 DIAGNOSIS — I97.111: ICD-10-CM

## 2018-10-29 DIAGNOSIS — Z98.890 OTHER SPECIFIED POSTPROCEDURAL STATES: ICD-10-CM

## 2018-10-29 DIAGNOSIS — I25.10 ATHEROSCLEROTIC HEART DISEASE OF NATIVE CORONARY ARTERY WITHOUT ANGINA PECTORIS: ICD-10-CM
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
## 2018-11-01 ENCOUNTER — APPOINTMENT (OUTPATIENT)
Dept: HEART AND VASCULAR | Facility: CLINIC | Age: 83
End: 2018-11-01
Payer: MEDICARE

## 2018-11-01 VITALS
TEMPERATURE: 98.1 F | WEIGHT: 143.98 LBS | HEIGHT: 60.43 IN | OXYGEN SATURATION: 99 % | DIASTOLIC BLOOD PRESSURE: 50 MMHG | BODY MASS INDEX: 27.9 KG/M2 | SYSTOLIC BLOOD PRESSURE: 122 MMHG | HEART RATE: 62 BPM

## 2018-11-01 DIAGNOSIS — Z78.9 OTHER SPECIFIED HEALTH STATUS: ICD-10-CM

## 2018-11-01 PROCEDURE — 99215 OFFICE O/P EST HI 40 MIN: CPT

## 2018-11-01 PROCEDURE — 93000 ELECTROCARDIOGRAM COMPLETE: CPT

## 2018-11-01 RX ORDER — ATORVASTATIN CALCIUM 20 MG/1
20 TABLET, FILM COATED ORAL AT BEDTIME
Refills: 0 | Status: ACTIVE | COMMUNITY

## 2018-11-01 RX ORDER — TAMSULOSIN HYDROCHLORIDE 0.4 MG/1
0.4 CAPSULE ORAL AT BEDTIME
Refills: 0 | Status: ACTIVE | COMMUNITY

## 2018-11-01 RX ORDER — METOPROLOL SUCCINATE 25 MG/1
25 TABLET, EXTENDED RELEASE ORAL DAILY
Refills: 0 | Status: ACTIVE | COMMUNITY

## 2018-11-01 RX ORDER — SENNOSIDES 8.6 MG TABLETS 8.6 MG/1
TABLET ORAL AT BEDTIME
Refills: 0 | Status: ACTIVE | COMMUNITY

## 2018-11-01 RX ORDER — CLOPIDOGREL BISULFATE 75 MG/1
75 TABLET, FILM COATED ORAL DAILY
Refills: 0 | Status: ACTIVE | COMMUNITY

## 2018-11-01 RX ORDER — ASPIRIN ENTERIC COATED TABLETS 81 MG 81 MG/1
81 TABLET, DELAYED RELEASE ORAL DAILY
Refills: 0 | Status: ACTIVE | COMMUNITY

## 2018-11-01 RX ORDER — LOSARTAN POTASSIUM 50 MG/1
50 TABLET, FILM COATED ORAL DAILY
Refills: 0 | Status: ACTIVE | COMMUNITY

## 2018-11-01 RX ORDER — PANTOPRAZOLE 40 MG/1
40 TABLET, DELAYED RELEASE ORAL DAILY
Refills: 0 | Status: ACTIVE | COMMUNITY

## 2018-11-11 PROCEDURE — 86923 COMPATIBILITY TEST ELECTRIC: CPT

## 2018-11-11 PROCEDURE — 80053 COMPREHEN METABOLIC PANEL: CPT

## 2018-11-11 PROCEDURE — 85730 THROMBOPLASTIN TIME PARTIAL: CPT

## 2018-11-11 PROCEDURE — C8929: CPT

## 2018-11-11 PROCEDURE — 71045 X-RAY EXAM CHEST 1 VIEW: CPT

## 2018-11-11 PROCEDURE — 85027 COMPLETE CBC AUTOMATED: CPT

## 2018-11-11 PROCEDURE — C1781: CPT

## 2018-11-11 PROCEDURE — 82550 ASSAY OF CK (CPK): CPT

## 2018-11-11 PROCEDURE — 86850 RBC ANTIBODY SCREEN: CPT

## 2018-11-11 PROCEDURE — P9016: CPT

## 2018-11-11 PROCEDURE — 82962 GLUCOSE BLOOD TEST: CPT

## 2018-11-11 PROCEDURE — 85045 AUTOMATED RETICULOCYTE COUNT: CPT

## 2018-11-11 PROCEDURE — 97161 PT EVAL LOW COMPLEX 20 MIN: CPT

## 2018-11-11 PROCEDURE — 82553 CREATINE MB FRACTION: CPT

## 2018-11-11 PROCEDURE — 93005 ELECTROCARDIOGRAM TRACING: CPT

## 2018-11-11 PROCEDURE — 84100 ASSAY OF PHOSPHORUS: CPT

## 2018-11-11 PROCEDURE — 86901 BLOOD TYPING SEROLOGIC RH(D): CPT

## 2018-11-11 PROCEDURE — 74176 CT ABD & PELVIS W/O CONTRAST: CPT

## 2018-11-11 PROCEDURE — 90662 IIV NO PRSV INCREASED AG IM: CPT

## 2018-11-11 PROCEDURE — 85610 PROTHROMBIN TIME: CPT

## 2018-11-11 PROCEDURE — 86900 BLOOD TYPING SEROLOGIC ABO: CPT

## 2018-11-11 PROCEDURE — 36415 COLL VENOUS BLD VENIPUNCTURE: CPT

## 2018-11-11 PROCEDURE — 36430 TRANSFUSION BLD/BLD COMPNT: CPT

## 2018-11-11 PROCEDURE — 87040 BLOOD CULTURE FOR BACTERIA: CPT

## 2018-11-11 PROCEDURE — 80048 BASIC METABOLIC PNL TOTAL CA: CPT

## 2018-11-11 PROCEDURE — 84484 ASSAY OF TROPONIN QUANT: CPT

## 2018-11-11 PROCEDURE — 83735 ASSAY OF MAGNESIUM: CPT

## 2018-11-27 ENCOUNTER — APPOINTMENT (OUTPATIENT)
Dept: HEART AND VASCULAR | Facility: CLINIC | Age: 83
End: 2018-11-27

## 2019-02-08 NOTE — PROGRESS NOTE ADULT - PROBLEM SELECTOR PLAN 5
- No acute CHF  - systolic and diastolic dysfunction, EF 35%  - f/u repeat echo
Unknown
- No acute CHF  - Echo as above
- s/p aortic valvuloplasty 4/2018 and TAVR 5/2018

## 2019-03-11 ENCOUNTER — APPOINTMENT (OUTPATIENT)
Dept: HEART AND VASCULAR | Facility: CLINIC | Age: 84
End: 2019-03-11
Payer: MEDICARE

## 2019-03-11 VITALS
SYSTOLIC BLOOD PRESSURE: 120 MMHG | DIASTOLIC BLOOD PRESSURE: 60 MMHG | HEART RATE: 44 BPM | BODY MASS INDEX: 30.03 KG/M2 | OXYGEN SATURATION: 95 % | TEMPERATURE: 98.7 F | HEIGHT: 60.43 IN | WEIGHT: 154.98 LBS

## 2019-03-11 PROCEDURE — 93000 ELECTROCARDIOGRAM COMPLETE: CPT

## 2019-03-11 PROCEDURE — 99214 OFFICE O/P EST MOD 30 MIN: CPT | Mod: 25

## 2019-03-11 RX ORDER — ISOSORBIDE MONONITRATE 60 MG/1
60 TABLET, EXTENDED RELEASE ORAL DAILY
Qty: 90 | Refills: 3 | Status: ACTIVE | COMMUNITY
Start: 1900-01-01 | End: 1900-01-01

## 2019-03-11 NOTE — DISCUSSION/SUMMARY
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 P\par \par Echo showed LVEF35% with apical anterior, apical lateral \par walls and true apex that were severely hypokinetic and apical septum and apical \par inferior walls that were dyskinetic and aneurysmal. Patient was managed as \par NSTEMI with heparin drip. \par \par  on aspirin, Plavix, \par metoprolol 25mg daily, and atorvastatin. \par \par s/p TAVR - opn plavix, and omt ekg intermiitent paced sinus jose luis, no sympoms\par \par \par HTN not at goal inc imdur to 60 and repeat echo\par \par \par Labs, radiology: ekg echo\par \par Aspirin therapy: yes\par \par LDL: statin\par \par Overall Risk: High Complexity\par \par Additional 8 min detailed discussion regarding prevention including but not limiting to goal SBP<130mmHg, Mediterranean diet discussion, No salt diet, diabetes screening, and goal LDL<100. Smoking cessation, EtOH use and depression screen where applicable\par Exercise counseling and plan discussed with patient\par will readdress and reinforce prevention in subsequent visits\par \par Interprofessional telephone assessment and management with patient’s verbal consent including a verbal and written report; 5 minutes of medical consultative discussion and review; medical team conference with interdisciplinary team of health care professionals, patient and/or family not present, 34 minutes, discussion of the case with another healthcare provider:\par * PCP\par * surgeon\par \par Administration of PHQ-2 for the benefit of the patient *

## 2019-03-11 NOTE — HISTORY OF PRESENT ILLNESS
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 Patient underwent \par uncomplicated surgery and developed post op chest pain on POD #1. 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 Patient was managed as \par NSTEMI with heparin drip. On POD 3, patient had decreased Hgb, CT A/P showed \par abdominal/groin hematoma, and he was transfused 2pRBC. Heparin was discontinued \par and patient remained stable. Patient was continued on aspirin, Plavix, \par metoprolol 25mg daily, and atorvastatin. \par \par 3/11/19 USOH, 100% compliant with meds\par location: chest\par duration: na\par  modifying factors: na\par timing: na\par severity: 0/10\par EKG unchanged from prior (in chart)\par consultation notes reviewed where appropriate\par ambi bp onitoring average sbp 150-160s\par

## 2019-03-11 NOTE — PHYSICAL EXAM
[General Appearance - Well Developed] : well developed [Normal Appearance] : normal appearance [Well Groomed] : well groomed [General Appearance - Well Nourished] : well nourished [No Deformities] : no deformities [General Appearance - In No Acute Distress] : no acute distress [Normal Conjunctiva] : the conjunctiva exhibited no abnormalities [Eyelids - No Xanthelasma] : the eyelids demonstrated no xanthelasmas [Normal Oral Mucosa] : normal oral mucosa [No Oral Pallor] : no oral pallor [No Oral Cyanosis] : no oral cyanosis [Normal Jugular Venous A Waves Present] : normal jugular venous A waves present [Normal Jugular Venous V Waves Present] : normal jugular venous V waves present [No Jugular Venous Schumacher A Waves] : no jugular venous schumacher A waves [Respiration, Rhythm And Depth] : normal respiratory rhythm and effort [Exaggerated Use Of Accessory Muscles For Inspiration] : no accessory muscle use [Auscultation Breath Sounds / Voice Sounds] : lungs were clear to auscultation bilaterally [Heart Rate And Rhythm] : heart rate and rhythm were normal [Heart Sounds] : normal S1 and S2 [Murmurs] : no murmurs present [Abdomen Soft] : soft [Abdomen Tenderness] : non-tender [Abdomen Mass (___ Cm)] : no abdominal mass palpated [Abnormal Walk] : normal gait [Gait - Sufficient For Exercise Testing] : the gait was sufficient for exercise testing [Nail Clubbing] : no clubbing of the fingernails [Cyanosis, Localized] : no localized cyanosis [Petechial Hemorrhages (___cm)] : no petechial hemorrhages [Skin Color & Pigmentation] : normal skin color and pigmentation [] : no rash [No Venous Stasis] : no venous stasis [Skin Lesions] : no skin lesions [No Skin Ulcers] : no skin ulcer [No Xanthoma] : no  xanthoma was observed [Oriented To Time, Place, And Person] : oriented to person, place, and time [Affect] : the affect was normal [Mood] : the mood was normal [No Anxiety] : not feeling anxious

## 2019-03-17 ENCOUNTER — FORM ENCOUNTER (OUTPATIENT)
Age: 84
End: 2019-03-17

## 2019-03-18 ENCOUNTER — OUTPATIENT (OUTPATIENT)
Dept: OUTPATIENT SERVICES | Facility: HOSPITAL | Age: 84
LOS: 1 days | End: 2019-03-18
Payer: MEDICARE

## 2019-03-18 DIAGNOSIS — Z95.1 PRESENCE OF AORTOCORONARY BYPASS GRAFT: Chronic | ICD-10-CM

## 2019-03-18 DIAGNOSIS — Z98.890 OTHER SPECIFIED POSTPROCEDURAL STATES: Chronic | ICD-10-CM

## 2019-03-18 DIAGNOSIS — I25.10 ATHEROSCLEROTIC HEART DISEASE OF NATIVE CORONARY ARTERY WITHOUT ANGINA PECTORIS: ICD-10-CM

## 2019-03-18 DIAGNOSIS — Z53.09 PROCEDURE AND TREATMENT NOT CARRIED OUT BECAUSE OF OTHER CONTRAINDICATION: Chronic | ICD-10-CM

## 2019-03-18 PROCEDURE — 93306 TTE W/DOPPLER COMPLETE: CPT | Mod: 26

## 2019-03-18 PROCEDURE — C8929: CPT

## 2019-04-15 ENCOUNTER — APPOINTMENT (OUTPATIENT)
Dept: HEART AND VASCULAR | Facility: CLINIC | Age: 84
End: 2019-04-15
Payer: MEDICARE

## 2019-04-15 VITALS
TEMPERATURE: 97.9 F | OXYGEN SATURATION: 98 % | HEART RATE: 40 BPM | BODY MASS INDEX: 30.03 KG/M2 | HEIGHT: 60.24 IN | WEIGHT: 154.98 LBS | SYSTOLIC BLOOD PRESSURE: 124 MMHG | DIASTOLIC BLOOD PRESSURE: 48 MMHG

## 2019-04-15 PROCEDURE — 96161 CAREGIVER HEALTH RISK ASSMT: CPT

## 2019-04-15 PROCEDURE — 99214 OFFICE O/P EST MOD 30 MIN: CPT

## 2019-04-15 PROCEDURE — 93000 ELECTROCARDIOGRAM COMPLETE: CPT | Mod: 79

## 2019-04-15 NOTE — PHYSICAL EXAM
[General Appearance - Well Developed] : well developed [Normal Appearance] : normal appearance [General Appearance - Well Nourished] : well nourished [Well Groomed] : well groomed [No Deformities] : no deformities [General Appearance - In No Acute Distress] : no acute distress [Normal Conjunctiva] : the conjunctiva exhibited no abnormalities [Normal Oral Mucosa] : normal oral mucosa [Eyelids - No Xanthelasma] : the eyelids demonstrated no xanthelasmas [No Oral Pallor] : no oral pallor [No Oral Cyanosis] : no oral cyanosis [Normal Jugular Venous A Waves Present] : normal jugular venous A waves present [No Jugular Venous Schumacher A Waves] : no jugular venous schumacher A waves [Normal Jugular Venous V Waves Present] : normal jugular venous V waves present [Respiration, Rhythm And Depth] : normal respiratory rhythm and effort [Exaggerated Use Of Accessory Muscles For Inspiration] : no accessory muscle use [Auscultation Breath Sounds / Voice Sounds] : lungs were clear to auscultation bilaterally [Heart Sounds] : normal S1 and S2 [Heart Rate And Rhythm] : heart rate and rhythm were normal [Murmurs] : no murmurs present [Abdomen Soft] : soft [Abdomen Tenderness] : non-tender [Abnormal Walk] : normal gait [Abdomen Mass (___ Cm)] : no abdominal mass palpated [Gait - Sufficient For Exercise Testing] : the gait was sufficient for exercise testing [Nail Clubbing] : no clubbing of the fingernails [Cyanosis, Localized] : no localized cyanosis [Petechial Hemorrhages (___cm)] : no petechial hemorrhages [Skin Color & Pigmentation] : normal skin color and pigmentation [] : no rash [No Venous Stasis] : no venous stasis [Skin Lesions] : no skin lesions [No Skin Ulcers] : no skin ulcer [No Xanthoma] : no  xanthoma was observed [Oriented To Time, Place, And Person] : oriented to person, place, and time [Mood] : the mood was normal [Affect] : the affect was normal [No Anxiety] : not feeling anxious

## 2019-04-15 NOTE — HISTORY OF PRESENT ILLNESS
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 Patient underwent \par uncomplicated surgery and developed post op chest pain on POD #1. 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 Patient was managed as \par NSTEMI with heparin drip. On POD 3, patient had decreased Hgb, CT A/P showed \par abdominal/groin hematoma, and he was transfused 2pRBC. Heparin was discontinued \par and patient remained stable. Patient was continued on aspirin, Plavix, \par metoprolol 25mg daily, and atorvastatin. \par \par 3/11/19 USOH, 100% compliant with meds\par location: chest\par duration: na\par  modifying factors: na\par timing: na\par severity: 0/10\par EKG unchanged from prior (in chart)\par consultation notes reviewed where appropriate\par ambi bp onitoring average sbp 150-160s\par \par 4/15/19 USOH, 100% compliant with meds\par location: chest\par duration: na\par  modifying factors: na\par timing: na\par severity: 0/10\par EKG unchanged from prior (in chart)\par consultation notes reviewed where appropriate\par  \par

## 2019-04-15 NOTE — DISCUSSION/SUMMARY
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 for elective bilateral inguinal hernia repair. P\par \par Echo showed LVEF35% with apical anterior, apical lateral \par walls and true apex that were severely hypokinetic and apical septum and apical \par inferior walls that were dyskinetic and aneurysmal. Patient was managed as \par NSTEMI with heparin drip. \par \par  on aspirin, Plavix, \par metoprolol 25mg daily, and atorvastatin. \par \par s/p TAVR - on plavix, and omt ekg intermittent paced sinus jose luis, no symptoms\par \par \par HTN at goal  imdur \par \par \par \par \par Labs, radiology:\par \par Aspirin therapy:\par \par LDL: n/a\par \par Overall Risk: High Complexity\par \par Administration of PHQ-2/9 for the benefit of the patient\par Score = 0\par Rx = Reassurance provided\par \par Prevention counseling 17min\par Patient was competent and alert at the time of the counseling provided. Will reinforce subsequent visit.\par ·	The patient’s blood pressure goal SBP<130mmHg\par ·	Advised Mediterranean diet discussion, No salt diet - including increased CAD PAD and mortality \par ·	Assessed willingness to attempt - contemplation stage\par ·	Providing methods and skills for prevention - prevention medicine referral, nutritionist\par ·	Medication management - n/a\par ·	Resources provided - prevention medicine referral, nutritionist, cbt therapy, group therapy\par ·	Setting goals - not ready to commit to a date              \par ·	Follow-up arranged - next scheduled visit\par \par

## 2019-06-03 ENCOUNTER — APPOINTMENT (OUTPATIENT)
Dept: CARDIOTHORACIC SURGERY | Facility: CLINIC | Age: 84
End: 2019-06-03

## 2019-10-14 ENCOUNTER — APPOINTMENT (OUTPATIENT)
Dept: HEART AND VASCULAR | Facility: CLINIC | Age: 84
End: 2019-10-14

## 2021-04-22 NOTE — DISCHARGE NOTE ADULT - CARE PROVIDER_API CALL
Detail Level: Simple
Detail Level: Detailed
Ancelmo Cyr), Cardiology; Interventional Cardiology  [address]  [address] [facility], [place] [address]  Phone: [phone]  Fax: [phone]

## 2023-03-01 NOTE — PATIENT PROFILE ADULT. - TEACHING/LEARNING FACTORS IMPACT ABILITY TO LEARN
Spoke with patient about arrival time @ 0845.   EGD/Colon    Prep instructions reviewed: the day before the procedure, follow a clear liquid diet all day, then start the first 1/2 of prep at 5pm and take 2nd 1/2 of prep @ 0330.  Pt must be completely NPO when prep completed @ 0530.  Suprep instructions sent to portal.             Medications: Do not take Insulin or oral diabetic medications the day of the procedure.  Take as prescribed: heart, seizure and blood pressure medication in the morning with a sip of water (less than an ounce).  Take any breathing medications and bring inhalers to hospital with you Leave all valuables and jewelry at home.     Wear comfortable clothes to procedure to change into hospital gown You cannot drive for 24 hours after your procedure because you will receive sedation for your procedure to make you comfortable.  A ride must be provided at discharge.          language/cognitive limitations

## 2023-09-12 NOTE — ED PROVIDER NOTE - NS ED MD DISPO ADMIT [facility] PALLIATIVE CARE
NONE Complex Repair And Graft Additional Text (Will Appearing After The Standard Complex Repair Text): The complex repair was not sufficient to completely close the primary defect. The remaining additional defect was repaired with the graft mentioned below.

## 2024-06-03 NOTE — CONSULT NOTE ADULT - I WAS PHYSICALLY PRESENT FOR THE KEY PORTIONS OF THE EVALUATION AND MANAGEMENT (E/M) SERVICE PROVIDED.  I AGREE WITH THE ABOVE HISTORY, PHYSICAL, AND PLAN WHICH I HAVE REVIEWED AND EDITED WHERE APPROPRIATE
"CARDIOTHORACIC SURGERY FOLLOW-UP PROGRESS NOTE  Subjective   Edward D Hume [id number]  6/3/2024    Patient is a 72 y.o. male who presents for routine post-operative follow up. He denies any present complaints. Activity level has been appropriate.       Objective   /71 (BP Location: Left arm, Patient Position: Sitting, BP Cuff Size: Adult)   Pulse 74   Ht 1.803 m (5' 11\")   Wt 93.9 kg (207 lb)   SpO2 96%   BMI 28.87 kg/m²   Heart: Regular rate and rhythm, S1, S2 normal, no murmur, click, rub or gallop.  Lungs: Clear to auscultation bilaterally.  Abdomen: Soft, non-distended, non-tender to palpation.  Extremities: Warm, well perfused, pulses intact, no cyanosis, clubbing, or edema.  Neuro: Alert and oriented X4, moving all extremities with no deficits observed.  Incision(s): Well healing without erythema or drainage.  Imaging: Chest x-ray reviewed. Sternum healing well.    Assessment/Plan   Can resume normal activities    Rudolph Price MD    " Statement Selected

## 2024-08-12 NOTE — PROGRESS NOTE ADULT - PROVIDER SPECIALTY LIST ADULT
Cardiology
Surgery
calm

## 2024-08-22 NOTE — PATIENT PROFILE ADULT. - ALCOHOL USE HISTORY SINGLE SELECT
Reviewed the culture results, cipro will not work.  The macrobid sensitivity is too low, not a good option,.    We can try bactrim, he does not have a sulfa allergy.  Sent an rx for this BID, for 7 days.   Have him start it today and send an update with status in 2 days  Zeynep Rose MD    never